# Patient Record
Sex: FEMALE | Race: WHITE | NOT HISPANIC OR LATINO | Employment: OTHER | URBAN - METROPOLITAN AREA
[De-identification: names, ages, dates, MRNs, and addresses within clinical notes are randomized per-mention and may not be internally consistent; named-entity substitution may affect disease eponyms.]

---

## 2022-08-20 ENCOUNTER — APPOINTMENT (OUTPATIENT)
Dept: GENERAL RADIOLOGY | Facility: CLINIC | Age: 72
End: 2022-08-20
Attending: EMERGENCY MEDICINE
Payer: COMMERCIAL

## 2022-08-20 ENCOUNTER — OFFICE VISIT (OUTPATIENT)
Dept: URGENT CARE | Facility: URGENT CARE | Age: 72
End: 2022-08-20
Payer: COMMERCIAL

## 2022-08-20 ENCOUNTER — APPOINTMENT (OUTPATIENT)
Dept: ULTRASOUND IMAGING | Facility: CLINIC | Age: 72
End: 2022-08-20
Attending: EMERGENCY MEDICINE
Payer: COMMERCIAL

## 2022-08-20 ENCOUNTER — NURSE TRIAGE (OUTPATIENT)
Dept: NURSING | Facility: CLINIC | Age: 72
End: 2022-08-20

## 2022-08-20 ENCOUNTER — HOSPITAL ENCOUNTER (EMERGENCY)
Facility: CLINIC | Age: 72
Discharge: HOME | End: 2022-08-20
Attending: EMERGENCY MEDICINE | Admitting: EMERGENCY MEDICINE
Payer: COMMERCIAL

## 2022-08-20 VITALS
HEIGHT: 63 IN | WEIGHT: 155 LBS | HEART RATE: 58 BPM | OXYGEN SATURATION: 100 % | DIASTOLIC BLOOD PRESSURE: 101 MMHG | TEMPERATURE: 97.8 F | BODY MASS INDEX: 27.46 KG/M2 | SYSTOLIC BLOOD PRESSURE: 163 MMHG | RESPIRATION RATE: 16 BRPM

## 2022-08-20 VITALS
RESPIRATION RATE: 20 BRPM | OXYGEN SATURATION: 100 % | DIASTOLIC BLOOD PRESSURE: 85 MMHG | HEART RATE: 63 BPM | TEMPERATURE: 97.9 F | SYSTOLIC BLOOD PRESSURE: 147 MMHG | WEIGHT: 155 LBS

## 2022-08-20 DIAGNOSIS — M25.552 HIP PAIN, LEFT: ICD-10-CM

## 2022-08-20 DIAGNOSIS — R60.0 LEG EDEMA, LEFT: Primary | ICD-10-CM

## 2022-08-20 DIAGNOSIS — M25.552 PAIN IN JOINT INVOLVING PELVIC REGION AND THIGH, LEFT: ICD-10-CM

## 2022-08-20 DIAGNOSIS — M79.652 PAIN OF LEFT THIGH: ICD-10-CM

## 2022-08-20 PROCEDURE — 250N000013 HC RX MED GY IP 250 OP 250 PS 637: Performed by: EMERGENCY MEDICINE

## 2022-08-20 PROCEDURE — 73502 X-RAY EXAM HIP UNI 2-3 VIEWS: CPT

## 2022-08-20 PROCEDURE — 93971 EXTREMITY STUDY: CPT | Mod: LT

## 2022-08-20 PROCEDURE — 99284 EMERGENCY DEPT VISIT MOD MDM: CPT | Mod: 25

## 2022-08-20 PROCEDURE — 99207 PR NO CHARGE LOS: CPT | Performed by: NURSE PRACTITIONER

## 2022-08-20 RX ORDER — FAMOTIDINE 20 MG/1
1 TABLET, FILM COATED ORAL 2 TIMES DAILY
COMMUNITY
Start: 2021-06-10 | End: 2023-05-07

## 2022-08-20 RX ORDER — MELOXICAM 15 MG/1
1 TABLET ORAL DAILY
COMMUNITY
Start: 2021-11-01 | End: 2023-08-10

## 2022-08-20 RX ORDER — CYCLOBENZAPRINE HCL 10 MG
10 TABLET ORAL DAILY
COMMUNITY
Start: 2022-05-03 | End: 2023-05-07

## 2022-08-20 RX ORDER — ACETAMINOPHEN 500 MG
500 TABLET ORAL
COMMUNITY
Start: 2022-05-03 | End: 2023-05-07

## 2022-08-20 RX ORDER — PRAMIPEXOLE DIHYDROCHLORIDE 0.12 MG/1
1 TABLET ORAL AT BEDTIME
COMMUNITY
Start: 2022-01-25

## 2022-08-20 RX ORDER — CETIRIZINE HYDROCHLORIDE 10 MG/1
10 TABLET ORAL DAILY
COMMUNITY
Start: 2020-08-31

## 2022-08-20 RX ORDER — IBUPROFEN 400 MG/1
400 TABLET, FILM COATED ORAL ONCE
Status: COMPLETED | OUTPATIENT
Start: 2022-08-20 | End: 2022-08-20

## 2022-08-20 RX ORDER — LOSARTAN POTASSIUM 50 MG/1
1 TABLET ORAL DAILY
COMMUNITY
Start: 2021-12-16 | End: 2023-05-07

## 2022-08-20 RX ORDER — ALBUTEROL SULFATE 90 UG/1
2 AEROSOL, METERED RESPIRATORY (INHALATION) EVERY 4 HOURS PRN
COMMUNITY
Start: 2021-01-22

## 2022-08-20 RX ORDER — CAMPHOR 0.45 %
1 GEL (GRAM) TOPICAL PRN
COMMUNITY
Start: 2022-08-18

## 2022-08-20 RX ORDER — TRIAMTERENE AND HYDROCHLOROTHIAZIDE 37.5; 25 MG/1; MG/1
1 CAPSULE ORAL DAILY
Status: ON HOLD | COMMUNITY
Start: 2021-11-01 | End: 2023-05-10

## 2022-08-20 RX ORDER — PREDNISONE 20 MG/1
20 TABLET ORAL
COMMUNITY
Start: 2022-03-02 | End: 2023-05-07

## 2022-08-20 RX ADMIN — IBUPROFEN 400 MG: 400 TABLET, FILM COATED ORAL at 15:23

## 2022-08-20 ASSESSMENT — ACTIVITIES OF DAILY LIVING (ADL)
ADLS_ACUITY_SCORE: 33
ADLS_ACUITY_SCORE: 35

## 2022-08-20 ASSESSMENT — ENCOUNTER SYMPTOMS
WEAKNESS: 0
NUMBNESS: 0
FEVER: 0
ARTHRALGIAS: 1
BACK PAIN: 1
SHORTNESS OF BREATH: 0

## 2022-08-20 NOTE — ED TRIAGE NOTES
Left hip left thigh pain since Wednesday - denies any redness denies any swelling denies trauma        Triage Assessment     Row Name 08/20/22 6001       Triage Assessment (Adult)    Airway WDL WDL       Respiratory WDL    Respiratory WDL WDL       Cardiac WDL    Cardiac WDL WDL       Peripheral/Neurovascular WDL    Peripheral Neurovascular WDL WDL       Cognitive/Neuro/Behavioral WDL    Cognitive/Neuro/Behavioral WDL WDL

## 2022-08-20 NOTE — TELEPHONE ENCOUNTER
"Patient is complaining of pain in her left hip area that radiates into her left thigh, which makes it \"difficult for her to to walk\"  Patient thinks she \"overdid exercise on Tuesday and Wednesday three to four days ago at the gym\"  08/19/22 pain was excruciating, today 08/20/22 pain is improved  Patient is taking tylenol, miloxicam, and heating pad which are non-effective  Rates pain 5/10  Triage guidelines recommend to see pcp within 3 days  98.7 orally    Reason for Disposition    [1] MODERATE pain (e.g., interferes with normal activities, limping) AND [2] present > 3 days    Additional Information    Negative: Looks like a broken bone or dislocated joint (e.g., crooked or deformed)    Negative: Sounds like a life-threatening emergency to the triager    Negative: Followed a hip injury    Negative: Leg pain is main symptom    Negative: Back pain radiating (shooting) into hip    Negative: [1] SEVERE pain (e.g., excruciating, unable to do any normal activities) AND [2] fever    Negative: Can't stand (bear weight) or walk    Negative: [1] Red area or streak AND [2] fever    Negative: Patient sounds very sick or weak to the triager    Negative: [1] SEVERE pain (e.g., excruciating, unable to do any normal activities) AND [2] not improved after 2 hours of pain medicine    Negative: [1] Looks infected (spreading redness, pus) AND [2] large red area (> 2 in. or 5 cm)    Negative: [1] Painful rash AND [2] multiple small blisters grouped together (i.e., dermatomal distribution or \"band\" or \"stripe\")    Negative: Looks like a boil, infected sore, or deep ulcer    Negative: [1] Localized rash is very painful AND [2] no fever    Negative: [1] Redness of the skin AND [2] no fever    Negative: Numbness in a leg or foot (i.e., loss of sensation)    Protocols used: HIP PAIN-A-AH      "

## 2022-08-20 NOTE — PROGRESS NOTES
"Assessment & Plan     Leg edema, left      Pain in joint involving pelvic region and thigh, left    Discussed we cannot get US to r/o clot in UC.    Discussed options including XR and d dimer in UC or to go to ER.    Pt opted to go to ER to rule out clot.         Return in about 1 week (around 8/27/2022) for with regular provider if symptoms persist.    ROSIBEL Hernandez CNP  [facility] SALVADOR Lopez is a 71 year old female who presents to clinic today for the following health issues:  Chief Complaint   Patient presents with     Urgent Care     Left leg and hip pain possible sciatica, HX of lower back pain      HPI    Left thigh more swollen  Worried about a blood clot  Not red or hot  Acute pain x 3 days  Left side Sciatica flaring \"butt pain\".  Does not know if it is related to a hard workout or a clot.    Non smoker  No history of clot/DVT/PE     Review of Systems  Constitutional, HEENT, cardiovascular, pulmonary, gi and gu systems are negative, except as otherwise noted.      Objective    BP (!) 147/85   Pulse 63   Temp 97.9  F (36.6  C)   Resp 20   Wt 70.3 kg (155 lb)   SpO2 100%   Physical Exam         "

## 2022-08-20 NOTE — ED PROVIDER NOTES
"  History     Chief Complaint:  Leg Pain      HPI   Jessica Ricketts is a 71 year old female who presents with pain in the left hip and anterior thigh for the past 3 days.  She did a harder workout on Wednesday and noticed the pain after this.  The pain is worse with walking.  She has been taking meloxicam and acetaminophen with minimal relief.  She denies any specific known injury.  She feels like the thigh might be a little swollen.  She denies any erythema or warmth.  She denies any fevers.  She denies any numbness or tingling down the leg.  She denies any pain below the knee.  She also notes some \"sciatica\" pain which is not abnormal for her down the left glutes.  She denies any chest pain, shortness of breath, hemoptysis.  She has no known clotting disorders or family history of thromboembolism.  She is not on any estrogen supplement.      Review of Systems   Constitutional: Negative for fever.   Respiratory: Negative for shortness of breath.    Cardiovascular: Negative for chest pain.   Musculoskeletal: Positive for arthralgias and back pain.   Neurological: Negative for weakness and numbness.   All other systems reviewed and are negative.        Allergies:  Oxycodone  Seasonal Allergies  Beta Adrenergic Blockers  Lisinopril    Medications:    acetaminophen (TYLENOL) 500 MG tablet  albuterol (PROAIR HFA/PROVENTIL HFA/VENTOLIN HFA) 108 (90 Base) MCG/ACT inhaler  cetirizine (ZYRTEC) 10 MG tablet  cyclobenzaprine (FLEXERIL) 10 MG tablet  diclofenac (VOLTAREN) 1 % topical gel  diphenhydramine-zinc acetate (BENADRYL ITCH RELIEF STICK) 2-0.1 % external stick  famotidine (PEPCID) 20 MG tablet  FLUoxetine (PROZAC) 20 MG capsule  fluticasone-vilanterol (BREO ELLIPTA) 200-25 MCG/INH inhaler  losartan (COZAAR) 50 MG tablet  meloxicam (MOBIC) 15 MG tablet  pramipexole (MIRAPEX) 0.125 MG tablet  predniSONE (DELTASONE) 20 MG tablet  saline 0.65 % SOLN  triamterene-HCTZ (DYAZIDE) 37.5-25 MG capsule         Past Medical " "History:   Past Surgical History:     History reviewed. No pertinent past medical history. History reviewed. No pertinent surgical history.   There are no problems to display for this patient.         Family History  History reviewed. No pertinent family history.    Social History:  PCP: Eugene Rush  Presents to the ED alone by private vehicle    Physical Exam     Patient Vitals for the past 24 hrs:   BP Temp Temp src Pulse Resp SpO2 Height Weight   08/20/22 1436 (!) 163/101 97.8  F (36.6  C) Oral 58 16 100 % 1.6 m (5' 3\") 70.3 kg (155 lb)       Physical Exam  Vitals and nursing note reviewed.   Constitutional:       General: She is not in acute distress.     Appearance: She is not ill-appearing.   HENT:      Head: Normocephalic and atraumatic.      Right Ear: External ear normal.      Left Ear: External ear normal.   Eyes:      Extraocular Movements: Extraocular movements intact.      Conjunctiva/sclera: Conjunctivae normal.   Cardiovascular:      Rate and Rhythm: Normal rate.      Pulses:           Dorsalis pedis pulses are 2+ on the left side.        Posterior tibial pulses are 2+ on the left side.   Pulmonary:      Effort: Pulmonary effort is normal. No respiratory distress.   Abdominal:      General: There is no distension.      Palpations: Abdomen is soft.      Tenderness: There is no abdominal tenderness. There is no guarding or rebound.   Musculoskeletal:         General: Tenderness (mild tenderness of L anterior thigh and hip) present. No swelling, deformity or signs of injury.      Left hip: Tenderness (mild) present. No deformity. Normal range of motion.      Left upper leg: Tenderness (mild) present. No swelling.   Skin:     Coloration: Skin is not pale.      Findings: No rash.   Neurological:      Mental Status: She is alert.      Sensory: Sensation is intact.      Motor: No weakness.   Psychiatric:         Behavior: Behavior normal.         Emergency Department Course     No results found for this " or any previous visit.    Imaging:  XR Pelvis w Hip Left 1 View   Final Result   IMPRESSION: No fracture. Mild degenerative changes in the right hip.      US Lower Extremity Venous Duplex Left   Final Result   IMPRESSION:   1.  No deep venous thrombosis in the left lower extremity.          Laboratory:  Labs Ordered and Resulted from Time of ED Arrival to Time of ED Departure - No data to display        Emergency Department Course:           Reviewed:  I reviewed nursing notes, vitals, past history and care everywhere    Interventions:  Medications   ibuprofen (ADVIL/MOTRIN) tablet 400 mg (400 mg Oral Given 22 1523)       Disposition:  The patient was discharged to home.    Impression & Plan      CMS Diagnoses: None       Medical Decision Makin-year-old female with left hip and anterior thigh pain for the past few days.  Suspect musculoskeletal etiology.  Possibly a quad or hip flexor strain or referred pain from left hip arthritis.  Pain is not down the left posterior thigh so less likely to be sciatica.  No numbness or weakness so unlikely radiculopathy.  No significant swelling, erythema, warmth so low suspicion for infectious etiology.  Also also have low suspicion for DVT, however this is patient's primary concern.  We will x-ray the left hip and pelvis to evaluate for arthritis and check left lower extremity venous Doppler.    Covid-19  Jessica Ricketts was evaluated during a global COVID-19 pandemic, which necessitated consideration that the patient might be at risk for infection with the SARS-CoV-2 virus that causes COVID-19.  Applicable protocols for evaluation were followed during the patient's care. COVID-19 was considered as part of the patient's evaluation.       Diagnosis:    ICD-10-CM    1. Hip pain, left  M25.552    2. Pain of left thigh  M79.652        Discharge Medications:  Discharge Medication List as of 2022  6:08 PM                 Daniel Valle MD  22 0194

## 2023-05-07 ENCOUNTER — HOSPITAL ENCOUNTER (OUTPATIENT)
Facility: CLINIC | Age: 73
Setting detail: OBSERVATION
Discharge: HOME | End: 2023-05-10
Attending: EMERGENCY MEDICINE | Admitting: INTERNAL MEDICINE
Payer: COMMERCIAL

## 2023-05-07 ENCOUNTER — APPOINTMENT (OUTPATIENT)
Dept: CT IMAGING | Facility: CLINIC | Age: 73
End: 2023-05-07
Attending: EMERGENCY MEDICINE
Payer: COMMERCIAL

## 2023-05-07 DIAGNOSIS — I49.1 ECTOPIC ATRIAL RHYTHM: Primary | ICD-10-CM

## 2023-05-07 DIAGNOSIS — R11.0 NAUSEA: ICD-10-CM

## 2023-05-07 DIAGNOSIS — E87.1 HYPONATREMIA: ICD-10-CM

## 2023-05-07 DIAGNOSIS — I10 BENIGN ESSENTIAL HYPERTENSION: ICD-10-CM

## 2023-05-07 DIAGNOSIS — R11.11 VOMITING WITHOUT NAUSEA, UNSPECIFIED VOMITING TYPE: ICD-10-CM

## 2023-05-07 DIAGNOSIS — R10.31 ABDOMINAL PAIN, RIGHT LOWER QUADRANT: ICD-10-CM

## 2023-05-07 DIAGNOSIS — R10.13 ABDOMINAL PAIN, EPIGASTRIC: ICD-10-CM

## 2023-05-07 DIAGNOSIS — K21.00 GASTROESOPHAGEAL REFLUX DISEASE WITH ESOPHAGITIS WITHOUT HEMORRHAGE: ICD-10-CM

## 2023-05-07 PROBLEM — F32.0 MILD SINGLE CURRENT EPISODE OF MAJOR DEPRESSIVE DISORDER (H): Status: ACTIVE | Noted: 2017-12-16

## 2023-05-07 PROBLEM — J30.9 ALLERGIC RHINITIS: Status: ACTIVE | Noted: 2019-10-07

## 2023-05-07 PROBLEM — M85.80 OSTEOPENIA DETERMINED BY X-RAY: Status: ACTIVE | Noted: 2018-06-08

## 2023-05-07 PROBLEM — J45.40 MODERATE PERSISTENT ASTHMA WITHOUT COMPLICATION: Status: ACTIVE | Noted: 2019-06-12

## 2023-05-07 PROBLEM — F33.41 RECURRENT MAJOR DEPRESSIVE DISORDER, IN PARTIAL REMISSION (H): Status: ACTIVE | Noted: 2020-11-06

## 2023-05-07 PROBLEM — G56.03 BILATERAL CARPAL TUNNEL SYNDROME: Status: ACTIVE | Noted: 2023-05-07

## 2023-05-07 PROBLEM — H90.42 SENSORINEURAL HEARING LOSS (SNHL) OF LEFT EAR WITH UNRESTRICTED HEARING OF RIGHT EAR: Status: ACTIVE | Noted: 2022-09-21

## 2023-05-07 PROBLEM — M25.9 DISORDER OF SHOULDER: Status: ACTIVE | Noted: 2018-05-31

## 2023-05-07 PROBLEM — G25.81 RESTLESS LEG SYNDROME: Status: ACTIVE | Noted: 2019-02-10

## 2023-05-07 PROBLEM — G47.33 OSA (OBSTRUCTIVE SLEEP APNEA): Status: ACTIVE | Noted: 2023-05-07

## 2023-05-07 PROBLEM — M43.17 SPONDYLOLISTHESIS AT L5-S1 LEVEL: Status: ACTIVE | Noted: 2017-11-01

## 2023-05-07 PROBLEM — B37.9 CANDIDIASIS: Status: ACTIVE | Noted: 2018-10-19

## 2023-05-07 LAB
ALBUMIN SERPL BCG-MCNC: 4.1 G/DL (ref 3.5–5.2)
ALBUMIN UR-MCNC: NEGATIVE MG/DL
ALP SERPL-CCNC: 52 U/L (ref 35–104)
ALT SERPL W P-5'-P-CCNC: 22 U/L (ref 10–35)
ANION GAP SERPL CALCULATED.3IONS-SCNC: 10 MMOL/L (ref 7–15)
APPEARANCE UR: CLEAR
AST SERPL W P-5'-P-CCNC: 18 U/L (ref 10–35)
BASOPHILS # BLD AUTO: 0 10E3/UL (ref 0–0.2)
BASOPHILS NFR BLD AUTO: 0 %
BILIRUB DIRECT SERPL-MCNC: <0.2 MG/DL (ref 0–0.3)
BILIRUB SERPL-MCNC: 0.3 MG/DL
BILIRUB UR QL STRIP: NEGATIVE
BUN SERPL-MCNC: 17 MG/DL (ref 8–23)
CALCIUM SERPL-MCNC: 9.1 MG/DL (ref 8.8–10.2)
CHLORIDE SERPL-SCNC: 88 MMOL/L (ref 98–107)
COLOR UR AUTO: ABNORMAL
CREAT SERPL-MCNC: 0.8 MG/DL (ref 0.51–0.95)
DEPRECATED HCO3 PLAS-SCNC: 28 MMOL/L (ref 22–29)
EOSINOPHIL # BLD AUTO: 0.1 10E3/UL (ref 0–0.7)
EOSINOPHIL NFR BLD AUTO: 1 %
ERYTHROCYTE [DISTWIDTH] IN BLOOD BY AUTOMATED COUNT: 13.3 % (ref 10–15)
GFR SERPL CREATININE-BSD FRML MDRD: 79 ML/MIN/1.73M2
GLUCOSE SERPL-MCNC: 90 MG/DL (ref 70–99)
GLUCOSE UR STRIP-MCNC: NEGATIVE MG/DL
HCT VFR BLD AUTO: 34 % (ref 35–47)
HGB BLD-MCNC: 11.5 G/DL (ref 11.7–15.7)
HGB UR QL STRIP: NEGATIVE
IMM GRANULOCYTES # BLD: 0 10E3/UL
IMM GRANULOCYTES NFR BLD: 0 %
KETONES UR STRIP-MCNC: ABNORMAL MG/DL
LEUKOCYTE ESTERASE UR QL STRIP: NEGATIVE
LYMPHOCYTES # BLD AUTO: 1.4 10E3/UL (ref 0.8–5.3)
LYMPHOCYTES NFR BLD AUTO: 21 %
MCH RBC QN AUTO: 30.3 PG (ref 26.5–33)
MCHC RBC AUTO-ENTMCNC: 33.8 G/DL (ref 31.5–36.5)
MCV RBC AUTO: 90 FL (ref 78–100)
MONOCYTES # BLD AUTO: 0.6 10E3/UL (ref 0–1.3)
MONOCYTES NFR BLD AUTO: 8 %
NEUTROPHILS # BLD AUTO: 4.7 10E3/UL (ref 1.6–8.3)
NEUTROPHILS NFR BLD AUTO: 70 %
NITRATE UR QL: NEGATIVE
NRBC # BLD AUTO: 0 10E3/UL
NRBC BLD AUTO-RTO: 0 /100
PH UR STRIP: 7.5 [PH] (ref 5–7)
PLATELET # BLD AUTO: 282 10E3/UL (ref 150–450)
POTASSIUM SERPL-SCNC: 3.9 MMOL/L (ref 3.4–5.3)
PROT SERPL-MCNC: 6.5 G/DL (ref 6.4–8.3)
RBC # BLD AUTO: 3.8 10E6/UL (ref 3.8–5.2)
SODIUM SERPL-SCNC: 126 MMOL/L (ref 136–145)
SP GR UR STRIP: 1 (ref 1–1.03)
UROBILINOGEN UR STRIP-MCNC: NORMAL MG/DL
WBC # BLD AUTO: 6.8 10E3/UL (ref 4–11)

## 2023-05-07 PROCEDURE — 96375 TX/PRO/DX INJ NEW DRUG ADDON: CPT

## 2023-05-07 PROCEDURE — 74177 CT ABD & PELVIS W/CONTRAST: CPT

## 2023-05-07 PROCEDURE — 80053 COMPREHEN METABOLIC PANEL: CPT | Performed by: EMERGENCY MEDICINE

## 2023-05-07 PROCEDURE — G0378 HOSPITAL OBSERVATION PER HR: HCPCS

## 2023-05-07 PROCEDURE — 258N000003 HC RX IP 258 OP 636: Performed by: HOSPITALIST

## 2023-05-07 PROCEDURE — 36415 COLL VENOUS BLD VENIPUNCTURE: CPT | Performed by: EMERGENCY MEDICINE

## 2023-05-07 PROCEDURE — 258N000003 HC RX IP 258 OP 636: Performed by: EMERGENCY MEDICINE

## 2023-05-07 PROCEDURE — 96374 THER/PROPH/DIAG INJ IV PUSH: CPT | Mod: 59

## 2023-05-07 PROCEDURE — 250N000011 HC RX IP 250 OP 636: Performed by: EMERGENCY MEDICINE

## 2023-05-07 PROCEDURE — 99223 1ST HOSP IP/OBS HIGH 75: CPT | Performed by: HOSPITALIST

## 2023-05-07 PROCEDURE — 96361 HYDRATE IV INFUSION ADD-ON: CPT

## 2023-05-07 PROCEDURE — 81003 URINALYSIS AUTO W/O SCOPE: CPT | Performed by: EMERGENCY MEDICINE

## 2023-05-07 PROCEDURE — 96376 TX/PRO/DX INJ SAME DRUG ADON: CPT

## 2023-05-07 PROCEDURE — 85025 COMPLETE CBC W/AUTO DIFF WBC: CPT | Performed by: EMERGENCY MEDICINE

## 2023-05-07 PROCEDURE — 82248 BILIRUBIN DIRECT: CPT | Performed by: STUDENT IN AN ORGANIZED HEALTH CARE EDUCATION/TRAINING PROGRAM

## 2023-05-07 PROCEDURE — C9113 INJ PANTOPRAZOLE SODIUM, VIA: HCPCS | Performed by: EMERGENCY MEDICINE

## 2023-05-07 PROCEDURE — 250N000013 HC RX MED GY IP 250 OP 250 PS 637: Performed by: HOSPITALIST

## 2023-05-07 PROCEDURE — 99285 EMERGENCY DEPT VISIT HI MDM: CPT | Mod: 25

## 2023-05-07 PROCEDURE — 250N000009 HC RX 250: Performed by: EMERGENCY MEDICINE

## 2023-05-07 RX ORDER — ACETAMINOPHEN 325 MG/1
325-650 TABLET ORAL EVERY 6 HOURS PRN
COMMUNITY

## 2023-05-07 RX ORDER — LOSARTAN POTASSIUM 50 MG/1
50 TABLET ORAL DAILY
Status: DISCONTINUED | OUTPATIENT
Start: 2023-05-08 | End: 2023-05-10

## 2023-05-07 RX ORDER — FLUOXETINE 10 MG/1
10 CAPSULE ORAL DAILY
COMMUNITY
Start: 2023-03-22

## 2023-05-07 RX ORDER — ONDANSETRON 4 MG/1
4 TABLET, ORALLY DISINTEGRATING ORAL EVERY 6 HOURS PRN
Qty: 10 TABLET | Refills: 0 | Status: SHIPPED | OUTPATIENT
Start: 2023-05-07 | End: 2023-05-10

## 2023-05-07 RX ORDER — LOSARTAN POTASSIUM 100 MG/1
100 TABLET ORAL DAILY
COMMUNITY
Start: 2023-04-27

## 2023-05-07 RX ORDER — PROCHLORPERAZINE 25 MG
12.5 SUPPOSITORY, RECTAL RECTAL EVERY 12 HOURS PRN
Status: DISCONTINUED | OUTPATIENT
Start: 2023-05-07 | End: 2023-05-10 | Stop reason: HOSPADM

## 2023-05-07 RX ORDER — PROCHLORPERAZINE MALEATE 5 MG
5 TABLET ORAL EVERY 6 HOURS PRN
Status: DISCONTINUED | OUTPATIENT
Start: 2023-05-07 | End: 2023-05-10 | Stop reason: HOSPADM

## 2023-05-07 RX ORDER — ONDANSETRON 2 MG/ML
4 INJECTION INTRAMUSCULAR; INTRAVENOUS EVERY 6 HOURS PRN
Status: DISCONTINUED | OUTPATIENT
Start: 2023-05-07 | End: 2023-05-09

## 2023-05-07 RX ORDER — CYCLOBENZAPRINE HCL 10 MG
10 TABLET ORAL DAILY
Status: DISCONTINUED | OUTPATIENT
Start: 2023-05-08 | End: 2023-05-07

## 2023-05-07 RX ORDER — SODIUM CHLORIDE 9 MG/ML
INJECTION, SOLUTION INTRAVENOUS ONCE
Status: COMPLETED | OUTPATIENT
Start: 2023-05-07 | End: 2023-05-07

## 2023-05-07 RX ORDER — ONDANSETRON 2 MG/ML
4 INJECTION INTRAMUSCULAR; INTRAVENOUS ONCE
Status: COMPLETED | OUTPATIENT
Start: 2023-05-07 | End: 2023-05-07

## 2023-05-07 RX ORDER — PRAMIPEXOLE DIHYDROCHLORIDE 0.12 MG/1
0.12 TABLET ORAL AT BEDTIME
Status: DISCONTINUED | OUTPATIENT
Start: 2023-05-07 | End: 2023-05-10 | Stop reason: HOSPADM

## 2023-05-07 RX ORDER — SODIUM CHLORIDE 9 MG/ML
INJECTION, SOLUTION INTRAVENOUS CONTINUOUS
Status: DISCONTINUED | OUTPATIENT
Start: 2023-05-07 | End: 2023-05-09

## 2023-05-07 RX ORDER — LIDOCAINE 40 MG/G
CREAM TOPICAL
Status: DISCONTINUED | OUTPATIENT
Start: 2023-05-07 | End: 2023-05-10 | Stop reason: HOSPADM

## 2023-05-07 RX ORDER — FLUTICASONE PROPIONATE 50 MCG
1 SPRAY, SUSPENSION (ML) NASAL DAILY
COMMUNITY

## 2023-05-07 RX ORDER — MONTELUKAST SODIUM 10 MG/1
10 TABLET ORAL AT BEDTIME
COMMUNITY
Start: 2023-04-03

## 2023-05-07 RX ORDER — CETIRIZINE HYDROCHLORIDE 10 MG/1
10 TABLET ORAL DAILY
Status: DISCONTINUED | OUTPATIENT
Start: 2023-05-08 | End: 2023-05-10 | Stop reason: HOSPADM

## 2023-05-07 RX ORDER — ONDANSETRON 4 MG/1
4 TABLET, ORALLY DISINTEGRATING ORAL EVERY 6 HOURS PRN
Status: DISCONTINUED | OUTPATIENT
Start: 2023-05-07 | End: 2023-05-09

## 2023-05-07 RX ORDER — HYDROMORPHONE HYDROCHLORIDE 1 MG/ML
0.5 INJECTION, SOLUTION INTRAMUSCULAR; INTRAVENOUS; SUBCUTANEOUS ONCE
Status: COMPLETED | OUTPATIENT
Start: 2023-05-07 | End: 2023-05-07

## 2023-05-07 RX ORDER — IOPAMIDOL 755 MG/ML
81 INJECTION, SOLUTION INTRAVASCULAR ONCE
Status: COMPLETED | OUTPATIENT
Start: 2023-05-07 | End: 2023-05-07

## 2023-05-07 RX ORDER — FLUTICASONE FUROATE AND VILANTEROL 200; 25 UG/1; UG/1
1 POWDER RESPIRATORY (INHALATION) EVERY EVENING
Status: DISCONTINUED | OUTPATIENT
Start: 2023-05-07 | End: 2023-05-10 | Stop reason: HOSPADM

## 2023-05-07 RX ADMIN — FLUOXETINE 20 MG: 20 CAPSULE ORAL at 22:44

## 2023-05-07 RX ADMIN — ONDANSETRON 4 MG: 2 INJECTION INTRAMUSCULAR; INTRAVENOUS at 15:06

## 2023-05-07 RX ADMIN — SODIUM CHLORIDE 1000 ML: 9 INJECTION, SOLUTION INTRAVENOUS at 15:05

## 2023-05-07 RX ADMIN — HYDROMORPHONE HYDROCHLORIDE 0.5 MG: 1 INJECTION, SOLUTION INTRAMUSCULAR; INTRAVENOUS; SUBCUTANEOUS at 17:26

## 2023-05-07 RX ADMIN — SODIUM CHLORIDE 61 ML: 9 INJECTION, SOLUTION INTRAVENOUS at 18:13

## 2023-05-07 RX ADMIN — SODIUM CHLORIDE 100 ML/HR: 9 INJECTION, SOLUTION INTRAVENOUS at 22:50

## 2023-05-07 RX ADMIN — ONDANSETRON 4 MG: 2 INJECTION INTRAMUSCULAR; INTRAVENOUS at 17:24

## 2023-05-07 RX ADMIN — SODIUM CHLORIDE: 9 INJECTION, SOLUTION INTRAVENOUS at 17:26

## 2023-05-07 RX ADMIN — SODIUM CHLORIDE: 9 INJECTION, SOLUTION INTRAVENOUS at 20:44

## 2023-05-07 RX ADMIN — IOPAMIDOL 81 ML: 755 INJECTION, SOLUTION INTRAVENOUS at 18:13

## 2023-05-07 RX ADMIN — PANTOPRAZOLE SODIUM 80 MG: 40 INJECTION, POWDER, FOR SOLUTION INTRAVENOUS at 20:02

## 2023-05-07 RX ADMIN — PRAMIPEXOLE DIHYDROCHLORIDE 0.12 MG: 0.12 TABLET ORAL at 22:44

## 2023-05-07 ASSESSMENT — ACTIVITIES OF DAILY LIVING (ADL)
ADLS_ACUITY_SCORE: 35
ADLS_ACUITY_SCORE: 33
ADLS_ACUITY_SCORE: 20
ADLS_ACUITY_SCORE: 35

## 2023-05-07 ASSESSMENT — ENCOUNTER SYMPTOMS
DIARRHEA: 1
APPETITE CHANGE: 1
FEVER: 0
VOMITING: 1
ABDOMINAL PAIN: 1

## 2023-05-07 NOTE — ED PROVIDER NOTES
History     Chief Complaint:  Abdominal Pain       The history is provided by the patient and medical records.      Jessica Ricketts is a 72 year old female with a history of diverticulitis and chronic atrophic gastritis who presents with abdominal pain. The patient reports that four days ago in the evening she began experiencing right lower quadrant abdominal pain. She explains that since then she has experienced vomiting, decreased appetite, and loose stools yesterday and today. She describes the pain as a worsening dull ache. She is unsure if the pain is worsened by movement. She denies experiencing fever or any urinary problems. She also denies any history of kidney stones or diverticulitis however, per chart review, the patient does have a history of diverticulitis. She was seen today at urgent care and was advised to present to the ED for imaging studies to rule out appendicitis.    The daughter brought up more history and specifically stated that the mom is really not had much in the way of nausea but has been pretty much just vomiting and some loose stool.  She has been trying to push water and can keep liquids down but any solid foods come right back up.  This is going on her fifth day now without any solids and she is concerned about her because of her age and the fact that she is gotten weaker.    Independent Historian:   None - Patient Only    Review of External Notes: I reviewed the office visit from 4/27/2023    ROS:  Review of Systems   Constitutional: Positive for appetite change. Negative for fever.   Gastrointestinal: Positive for abdominal pain, diarrhea and vomiting.   Genitourinary: Negative.    All other systems reviewed and are negative.      Allergies:  Oxycodone  Seasonal Allergies  Beta Adrenergic Blockers  Lisinopril     Medications:    Albuterol inhaler  Cyclobenzaprine  Fluoxetine  Fluticasone furoate-vilanterol  "inhaler  Losartan  Meloxicam  Montelukast  Pramipexole  Prednisone  Tizanidine  Triamterene-hydrochlorothiazide    Past Medical History:    Actinic keratosis  Allergic rhinitis  Antibiotic prophylaxis for dental procedure indicated due to prior joint replacement  Asthma, moderate persistent without complication  Bilateral carpal tunnel syndrome  Chronic atrophic gastritis without bleeding  Chronic low back pain  Diverticulitis  Diverticulosis  GERD with esophagitis  Hypertension  Internal hemorrhoid   Major depressive disorder, recurrent, in partial remission  Obstructive sleep apnea  Osteoarthritis  Osteopenia, determined by X-ray  Restless leg syndrome  Sensorineural hearing loss of left ear with unrestricted hearing of right ear  Shoulder impingement, right  Spondylolisthesis at L5-S1 level  Tinea corporis    Past Surgical History:    Arthroplasty knee, right, total  Colonoscopy with polypectomy  Cystoscopy, flexible, bladder repair  ORIF ankle, right  ORIF wrist, left  Tubal ligation    Family History:    Father: CHF, hypertension, kidney cancer  Mother: Breast cancer, hypertension, hyperlipidemia, dementia  Brother: Hypertension    Social History:  The patient presents to the ED with her daughter.  They arrived via private vehicle.  PCP: Eugene Rush     Physical Exam     Patient Vitals for the past 24 hrs:   BP Temp Temp src Pulse Resp SpO2 Height Weight   05/07/23 1843 -- -- -- -- -- 98 % -- --   05/07/23 1841 -- -- -- -- -- 97 % -- --   05/07/23 1743 -- -- -- -- -- 99 % -- --   05/07/23 1453 (!) 141/86 98  F (36.7  C) Oral 55 16 100 % 1.6 m (5' 3\") 72.6 kg (160 lb)        Physical Exam  Nursing note and vitals reviewed.    Constitutional:  Appears comfortable.    HENT:                Nose normal.  No discharge.      Oral mucosa is dry.  Eyes:    Conjunctivae are normal without injection.  Pupils are equal.  Cardiovascular:  Normal rate, regular rhythm with normal S1 and S2.      Normal heart sounds and " peripheral pulses 2+ and equal.       No murmur or ninfa.  Pulmonary:  Effort normal and breath sounds clear to auscultation bilaterally   GI:    Right lower quadrant pain with guarding.  She does have some epigastric pain as well.     No flank pain.    Musculoskeletal:  Normal range of motion. No extremity deformity.     No edema and no tenderness.    Neurological:   Alert and oriented. No focal weakness.  Skin:    Skin is warm and dry. No rash noted.   Psychiatric:   Behavior is normal. Appropriate mood and affect.     Judgment and thought content normal.     Emergency Department Course     Imaging:  CT Abdomen Pelvis w Contrast   Final Result   IMPRESSION:    1.  Normal appendix.   2.  No evidence for bowel obstruction.    3.  Sigmoid diverticulosis.   4.  No findings to account for the patient's symptoms.         Report per radiology    Laboratory:  Labs Ordered and Resulted from Time of ED Arrival to Time of ED Departure   UA MACROSCOPIC WITH REFLEX TO MICRO AND CULTURE - Abnormal       Result Value    Color Urine Straw      Appearance Urine Clear      Glucose Urine Negative      Bilirubin Urine Negative      Ketones Urine Trace (*)     Specific Gravity Urine 1.005      Blood Urine Negative      pH Urine 7.5 (*)     Protein Albumin Urine Negative      Urobilinogen Urine Normal      Nitrite Urine Negative      Leukocyte Esterase Urine Negative     BASIC METABOLIC PANEL - Abnormal    Sodium 126 (*)     Potassium 3.9      Chloride 88 (*)     Carbon Dioxide (CO2) 28      Anion Gap 10      Urea Nitrogen 17.0      Creatinine 0.80      Calcium 9.1      Glucose 90      GFR Estimate 79     CBC WITH PLATELETS AND DIFFERENTIAL - Abnormal    WBC Count 6.8      RBC Count 3.80      Hemoglobin 11.5 (*)     Hematocrit 34.0 (*)     MCV 90      MCH 30.3      MCHC 33.8      RDW 13.3      Platelet Count 282      % Neutrophils 70      % Lymphocytes 21      % Monocytes 8      % Eosinophils 1      % Basophils 0      % Immature  Granulocytes 0      NRBCs per 100 WBC 0      Absolute Neutrophils 4.7      Absolute Lymphocytes 1.4      Absolute Monocytes 0.6      Absolute Eosinophils 0.1      Absolute Basophils 0.0      Absolute Immature Granulocytes 0.0      Absolute NRBCs 0.0     HEPATIC FUNCTION PANEL - Normal    Protein Total 6.5      Albumin 4.1      Bilirubin Total 0.3      Alkaline Phosphatase 52      AST 18      ALT 22      Bilirubin Direct <0.20          Emergency Department Course & Assessments:    Interventions:  Medications   ondansetron (ZOFRAN) injection 4 mg (4 mg Intravenous $Given 5/7/23 1506)   0.9% sodium chloride BOLUS (0 mLs Intravenous Stopped 5/7/23 1701)   ondansetron (ZOFRAN) injection 4 mg (4 mg Intravenous $Given 5/7/23 1724)   sodium chloride 0.9% infusion ( Intravenous $New Bag 5/7/23 1726)   HYDROmorphone (PF) (DILAUDID) injection 0.5 mg (0.5 mg Intravenous $Given 5/7/23 1726)   Saline (61 mLs As instructed $Given 5/7/23 1813)   iopamidol (ISOVUE-370) solution 81 mL (81 mLs Intravenous $Given 5/7/23 1813)   pantoprazole (PROTONIX) IV push injection 80 mg (80 mg Intravenous $Given 5/7/23 2002)        Assessments:  1712 I obtained history and examined the patient.  1850 I returned to ED room #4 to explain CT findings and the plan going forward.  1910 I spoke again with the patient after she experienced an episode of vomiting.  1927 I spoke once again with the patient.    Independent Interpretation (X-rays, CTs, rhythm strip):  None    Consultations/Discussion of Management or Tests:  1940 I spoke on the phone with Dr. Ethan Medina, Gastroenterology.  2002 I spoke on the phone with Dr. Tay, Hospitalist.    Social Determinants of Health affecting care:   None    Disposition:  The patient was admitted to the hospital under the care of Dr. Tay.    Impression & Plan      Medical Decision Making:  Patient comes in with vomiting for 5 days unable to keep solids down but can keep water down.  Initially it sounded  like she had had a lot of nausea.  She had received 2 doses of Zofran.  She went to CT and came back and had an emesis.  She had received a liter of fluids, labs were obtained and her comprehensive panel is normal other than a low sodium of 126.  She is on thiazide diuretic.  Her white count is normal hemoglobin 11.5 and her urine is normal.  CT did not show any evidence of acute appendicitis or diverticulitis although she has diverticulosis.  No bowel obstruction or distention.  Her history that I obtained from the daughter makes me more concerned about some type of a delayed gastric emptying or partial gastric outlet obstruction.  She could have an ulcer or severe gastritis distally in the stomach.  She was given 80 of Protonix IV and I talked with Dr. Medina who feels that she should be scoped in the morning and I agree.  She will be kept n.p.o. after midnight and have talked to Dr. Tay who will be the admitting hospitalist.  We will restrict her free water and recheck her sodium in the morning.    Diagnosis:    ICD-10-CM    1. Abdominal pain, epigastric  R10.13       2. Abdominal pain, right lower quadrant  R10.31       3. Vomiting without nausea, unspecified vomiting type  R11.11       4. Hyponatremia  E87.1            Scribe Disclosure:  I, NEETA Howard am serving as a scribe at 5:14 PM on 5/7/2023 to document services personally performed by Mindy Mora MD based on my observations and the provider's statements to me.        Mindy Mora MD  05/07/23 2022

## 2023-05-07 NOTE — ED TRIAGE NOTES
Pt was seen at [facility] urgent care   Pt has lower right sided abd pain  Pt has had the pain for 5 days   Pt also has vomiting   Pt also has diarrha for 2 days

## 2023-05-08 LAB
ANION GAP SERPL CALCULATED.3IONS-SCNC: 8 MMOL/L (ref 7–15)
BUN SERPL-MCNC: 9.5 MG/DL (ref 8–23)
CALCIUM SERPL-MCNC: 8.6 MG/DL (ref 8.8–10.2)
CHLORIDE SERPL-SCNC: 98 MMOL/L (ref 98–107)
CREAT SERPL-MCNC: 0.75 MG/DL (ref 0.51–0.95)
DEPRECATED HCO3 PLAS-SCNC: 26 MMOL/L (ref 22–29)
ERYTHROCYTE [DISTWIDTH] IN BLOOD BY AUTOMATED COUNT: 13.5 % (ref 10–15)
GFR SERPL CREATININE-BSD FRML MDRD: 85 ML/MIN/1.73M2
GLUCOSE BLDC GLUCOMTR-MCNC: 85 MG/DL (ref 70–99)
GLUCOSE SERPL-MCNC: 92 MG/DL (ref 70–99)
HCT VFR BLD AUTO: 30.4 % (ref 35–47)
HGB BLD-MCNC: 10.1 G/DL (ref 11.7–15.7)
MAGNESIUM SERPL-MCNC: 1.7 MG/DL (ref 1.7–2.3)
MCH RBC QN AUTO: 30.1 PG (ref 26.5–33)
MCHC RBC AUTO-ENTMCNC: 33.2 G/DL (ref 31.5–36.5)
MCV RBC AUTO: 91 FL (ref 78–100)
PLATELET # BLD AUTO: 244 10E3/UL (ref 150–450)
POTASSIUM SERPL-SCNC: 4 MMOL/L (ref 3.4–5.3)
RBC # BLD AUTO: 3.36 10E6/UL (ref 3.8–5.2)
SODIUM SERPL-SCNC: 132 MMOL/L (ref 136–145)
TSH SERPL DL<=0.005 MIU/L-ACNC: 1.49 UIU/ML (ref 0.3–4.2)
UPPER GI ENDOSCOPY: NORMAL
WBC # BLD AUTO: 4.6 10E3/UL (ref 4–11)

## 2023-05-08 PROCEDURE — 36415 COLL VENOUS BLD VENIPUNCTURE: CPT | Performed by: HOSPITALIST

## 2023-05-08 PROCEDURE — 88305 TISSUE EXAM BY PATHOLOGIST: CPT | Mod: TC | Performed by: INTERNAL MEDICINE

## 2023-05-08 PROCEDURE — 96376 TX/PRO/DX INJ SAME DRUG ADON: CPT

## 2023-05-08 PROCEDURE — 99232 SBSQ HOSP IP/OBS MODERATE 35: CPT | Performed by: INTERNAL MEDICINE

## 2023-05-08 PROCEDURE — 250N000011 HC RX IP 250 OP 636: Performed by: HOSPITALIST

## 2023-05-08 PROCEDURE — 93010 ELECTROCARDIOGRAM REPORT: CPT | Performed by: INTERNAL MEDICINE

## 2023-05-08 PROCEDURE — G0378 HOSPITAL OBSERVATION PER HR: HCPCS

## 2023-05-08 PROCEDURE — 93005 ELECTROCARDIOGRAM TRACING: CPT | Mod: XU

## 2023-05-08 PROCEDURE — 99222 1ST HOSP IP/OBS MODERATE 55: CPT | Performed by: INTERNAL MEDICINE

## 2023-05-08 PROCEDURE — 84443 ASSAY THYROID STIM HORMONE: CPT | Performed by: INTERNAL MEDICINE

## 2023-05-08 PROCEDURE — C9113 INJ PANTOPRAZOLE SODIUM, VIA: HCPCS | Performed by: HOSPITALIST

## 2023-05-08 PROCEDURE — 250N000013 HC RX MED GY IP 250 OP 250 PS 637: Performed by: HOSPITALIST

## 2023-05-08 PROCEDURE — 80048 BASIC METABOLIC PNL TOTAL CA: CPT | Performed by: HOSPITALIST

## 2023-05-08 PROCEDURE — 83735 ASSAY OF MAGNESIUM: CPT | Performed by: HOSPITALIST

## 2023-05-08 PROCEDURE — 82962 GLUCOSE BLOOD TEST: CPT

## 2023-05-08 PROCEDURE — 85027 COMPLETE CBC AUTOMATED: CPT | Performed by: HOSPITALIST

## 2023-05-08 PROCEDURE — 43239 EGD BIOPSY SINGLE/MULTIPLE: CPT | Performed by: INTERNAL MEDICINE

## 2023-05-08 PROCEDURE — 88305 TISSUE EXAM BY PATHOLOGIST: CPT | Mod: 26 | Performed by: PATHOLOGY

## 2023-05-08 PROCEDURE — 999N000099 HC STATISTIC MODERATE SEDATION < 10 MIN: Performed by: INTERNAL MEDICINE

## 2023-05-08 PROCEDURE — 250N000011 HC RX IP 250 OP 636: Performed by: INTERNAL MEDICINE

## 2023-05-08 PROCEDURE — 258N000003 HC RX IP 258 OP 636: Performed by: HOSPITALIST

## 2023-05-08 PROCEDURE — 96375 TX/PRO/DX INJ NEW DRUG ADDON: CPT

## 2023-05-08 RX ORDER — NALOXONE HYDROCHLORIDE 0.4 MG/ML
0.2 INJECTION, SOLUTION INTRAMUSCULAR; INTRAVENOUS; SUBCUTANEOUS
Status: CANCELLED | OUTPATIENT
Start: 2023-05-08

## 2023-05-08 RX ORDER — MAGNESIUM OXIDE 400 MG/1
400 TABLET ORAL EVERY 4 HOURS
Status: DISCONTINUED | OUTPATIENT
Start: 2023-05-08 | End: 2023-05-08 | Stop reason: ALTCHOICE

## 2023-05-08 RX ORDER — FLUMAZENIL 0.1 MG/ML
0.2 INJECTION, SOLUTION INTRAVENOUS
Status: CANCELLED | OUTPATIENT
Start: 2023-05-08 | End: 2023-05-09

## 2023-05-08 RX ORDER — NALOXONE HYDROCHLORIDE 0.4 MG/ML
0.4 INJECTION, SOLUTION INTRAMUSCULAR; INTRAVENOUS; SUBCUTANEOUS
Status: CANCELLED | OUTPATIENT
Start: 2023-05-08

## 2023-05-08 RX ORDER — ACETAMINOPHEN 325 MG/1
650 TABLET ORAL EVERY 4 HOURS PRN
Status: DISCONTINUED | OUTPATIENT
Start: 2023-05-08 | End: 2023-05-10 | Stop reason: HOSPADM

## 2023-05-08 RX ORDER — LIDOCAINE 40 MG/G
CREAM TOPICAL
Status: DISCONTINUED | OUTPATIENT
Start: 2023-05-08 | End: 2023-05-08 | Stop reason: HOSPADM

## 2023-05-08 RX ORDER — LANOLIN ALCOHOL/MO/W.PET/CERES
3 CREAM (GRAM) TOPICAL AT BEDTIME
Status: DISCONTINUED | OUTPATIENT
Start: 2023-05-08 | End: 2023-05-08

## 2023-05-08 RX ORDER — MAGNESIUM SULFATE HEPTAHYDRATE 40 MG/ML
2 INJECTION, SOLUTION INTRAVENOUS ONCE
Status: COMPLETED | OUTPATIENT
Start: 2023-05-08 | End: 2023-05-08

## 2023-05-08 RX ORDER — ACETAMINOPHEN 650 MG/1
650 SUPPOSITORY RECTAL EVERY 4 HOURS PRN
Status: DISCONTINUED | OUTPATIENT
Start: 2023-05-08 | End: 2023-05-10 | Stop reason: HOSPADM

## 2023-05-08 RX ORDER — FENTANYL CITRATE 50 UG/ML
INJECTION, SOLUTION INTRAMUSCULAR; INTRAVENOUS PRN
Status: DISCONTINUED | OUTPATIENT
Start: 2023-05-08 | End: 2023-05-08 | Stop reason: HOSPADM

## 2023-05-08 RX ADMIN — SODIUM CHLORIDE: 9 INJECTION, SOLUTION INTRAVENOUS at 09:57

## 2023-05-08 RX ADMIN — PRAMIPEXOLE DIHYDROCHLORIDE 0.12 MG: 0.12 TABLET ORAL at 21:32

## 2023-05-08 RX ADMIN — PANTOPRAZOLE SODIUM 40 MG: 40 INJECTION, POWDER, FOR SOLUTION INTRAVENOUS at 10:05

## 2023-05-08 RX ADMIN — FLUTICASONE FUROATE AND VILANTEROL 1 PUFF: 200; 25 POWDER RESPIRATORY (INHALATION) at 21:42

## 2023-05-08 RX ADMIN — MELATONIN TAB 3 MG 3 MG: 3 TAB at 03:25

## 2023-05-08 RX ADMIN — ACETAMINOPHEN 650 MG: 325 TABLET ORAL at 18:29

## 2023-05-08 RX ADMIN — ONDANSETRON 4 MG: 2 INJECTION INTRAMUSCULAR; INTRAVENOUS at 08:36

## 2023-05-08 RX ADMIN — FLUOXETINE 20 MG: 20 CAPSULE ORAL at 21:32

## 2023-05-08 RX ADMIN — SODIUM CHLORIDE: 9 INJECTION, SOLUTION INTRAVENOUS at 21:33

## 2023-05-08 RX ADMIN — PANTOPRAZOLE SODIUM 40 MG: 40 INJECTION, POWDER, FOR SOLUTION INTRAVENOUS at 21:21

## 2023-05-08 RX ADMIN — ACETAMINOPHEN 650 MG: 325 TABLET ORAL at 03:25

## 2023-05-08 RX ADMIN — MAGNESIUM SULFATE HEPTAHYDRATE 2 G: 40 INJECTION, SOLUTION INTRAVENOUS at 08:43

## 2023-05-08 RX ADMIN — FLUTICASONE FUROATE AND VILANTEROL 1 PUFF: 200; 25 POWDER RESPIRATORY (INHALATION) at 01:26

## 2023-05-08 ASSESSMENT — ACTIVITIES OF DAILY LIVING (ADL)
ADLS_ACUITY_SCORE: 21
ADLS_ACUITY_SCORE: 20
ADLS_ACUITY_SCORE: 21
ADLS_ACUITY_SCORE: 20
ADLS_ACUITY_SCORE: 21
ADLS_ACUITY_SCORE: 20
ADLS_ACUITY_SCORE: 20

## 2023-05-08 NOTE — PHARMACY-ADMISSION MEDICATION HISTORY
Pharmacist Admission Medication History    Admission medication history is complete. The information provided in this note is only as accurate as the sources available at the time of the update.    Medication reconciliation/reorder completed by provider prior to medication history? Yes    Information Source(s): Patient, Family member and CareEverywhere/SureScripts via in-person    Pertinent Information: Patient's daughter will bring in Breo inhaler for inpatient use per patient request.    Also of note, patient states she threw up all of her morning medications after taking them today, 5/7/23.    Changes made to PTA medication list:    Added: Flonase, omeprazole    Deleted: cyclobenzaprine, famotidine, losartan 50 mg tabs (taking 100 mg instead), Dulera, prednisone    Changed: None    Medication Affordability:  Not including over the counter (OTC) medications, was there a time in the past 12 months when you did not take your medications as prescribed because of cost?: No    Allergies reviewed with patient and updates made in EHR: yes    Medication History Completed By: Janusz Blunt [facility] 5/7/2023 8:29 PM      Prior to Admission medications    Medication Sig Last Dose Taking? Auth Provider Long Term End Date   acetaminophen (TYLENOL) 325 MG tablet Take 325-650 mg by mouth every 6 hours as needed for mild pain Unknown at prn Yes Unknown, Entered By History     albuterol (PROAIR HFA/PROVENTIL HFA/VENTOLIN HFA) 108 (90 Base) MCG/ACT inhaler Inhale 2 puffs into the lungs every 4 hours as needed Unknown at prn Yes Reported, Patient Yes    cetirizine (ZYRTEC) 10 MG tablet Take 10 mg by mouth daily 5/7/2023 at am Yes Reported, Patient     diclofenac (VOLTAREN) 1 % topical gel Apply 2-4 g topically 3 times daily as needed Unknown at prn Yes Reported, Patient     diphenhydramine-zinc acetate (BENADRYL ITCH RELIEF STICK) 2-0.1 % external stick Apply 1 g topically as needed Unknown Yes Reported, Patient     FLUoxetine  (PROZAC) 10 MG capsule Take 10 mg by mouth daily With 20 mg capsule for total daily dose of 30 mg 5/6/2023 at pm Yes Reported, Patient Yes    FLUoxetine (PROZAC) 20 MG capsule Take 1 capsule by mouth daily With 10 mg capsule for total daily dose of 30 mg 5/6/2023 at pm Yes Reported, Patient Yes    fluticasone (FLONASE) 50 MCG/ACT nasal spray Spray 1 spray into both nostrils daily Past Week Yes Unknown, Entered By History     fluticasone-vilanterol (BREO ELLIPTA) 200-25 MCG/INH inhaler Inhale 1 puff into the lungs daily 5/6/2023 at pm Yes Reported, Patient     losartan (COZAAR) 100 MG tablet  5/7/2023 at am Yes Reported, Patient Yes    meloxicam (MOBIC) 15 MG tablet Take 1 tablet by mouth daily 5/7/2023 at am Yes Reported, Patient Yes    montelukast (SINGULAIR) 10 MG tablet  5/6/2023 at pm Yes Reported, Patient Yes    omeprazole (PRILOSEC) 20 MG DR capsule Take 20 mg by mouth daily 5/7/2023 at am Yes Unknown, Entered By History     ondansetron (ZOFRAN ODT) 4 MG ODT tab Take 1 tablet (4 mg) by mouth every 6 hours as needed for nausea  Yes Mindy Mora MD     pramipexole (MIRAPEX) 0.125 MG tablet Take 1 tablet by mouth At Bedtime 5/6/2023 at pm Yes Reported, Patient Yes    saline 0.65 % SOLN Spray 1 spray in nostril every 4 hours as needed Unknown at prn Yes Reported, Patient     triamterene-HCTZ (DYAZIDE) 37.5-25 MG capsule Take 1 capsule by mouth daily 5/7/2023 at am Yes Reported, Patient Yes

## 2023-05-08 NOTE — ED NOTES
"[facility]  ED Nurse Handoff Report    ED Chief complaint: Abdominal Pain      ED Diagnosis:   Final diagnoses:   Abdominal pain, epigastric   Abdominal pain, right lower quadrant   Vomiting without nausea, unspecified vomiting type   Hyponatremia       Code Status: Full Code    Allergies:   Allergies   Allergen Reactions     Oxycodone Nausea and Vomiting     Violent vomiting.  Violent vomiting.       Seasonal Allergies      Other reaction(s): EENT - sneezing  Hay fever     Beta Adrenergic Blockers Other (See Comments)     Contraindicated with immunotherapy     Lisinopril      Other reaction(s): Cough       Patient Story: Pt comes in with vomiting for several days as well as decreased PO intake. CT was unremarkable.   Focused Assessment:  N/V    Treatments and/or interventions provided: Protonix, Zofran, Clear liquids.  Patient's response to treatments and/or interventions: Has not vomited in several hours    To be done/followed up on inpatient unit:  EGD tomorrow    Does this patient have any cognitive concerns?: None    Activity level - Baseline/Home:  Independent  Activity Level - Current:   Independent    Patient's Preferred language: English   Needed?: No    Isolation: None  Infection: Not Applicable  Patient tested for COVID 19 prior to admission: NO  Bariatric?: No    Vital Signs:   Vitals:    05/07/23 1453 05/07/23 1743 05/07/23 1841 05/07/23 1843   BP: (!) 141/86      Pulse: 55      Resp: 16      Temp: 98  F (36.7  C)      TempSrc: Oral      SpO2: 100% 99% 97% 98%   Weight: 72.6 kg (160 lb)      Height: 1.6 m (5' 3\")          Cardiac Rhythm:     Was the PSS-3 completed:   Yes  What interventions are required if any?               Family Comments: Daughter at bedside  OBS brochure/video discussed/provided to patient/family: Yes              Name of person given brochure if not patient:               Relationship to patient:     For the majority of the shift this patient's behavior " christina Garner.   Behavioral interventions performed were None needed.    ED NURSE PHONE NUMBER: [phone]

## 2023-05-08 NOTE — PROGRESS NOTES
.RECEIVING UNIT ED HANDOFF REVIEW    ED Nurse Handoff Report was reviewed by: Long Singh RN on May 7, 2023 at 9:36 PM

## 2023-05-08 NOTE — CONSULTS
[facility]    Cardiology Consultation     Date of Admission:  5/7/2023    Review of the result(s) of each unique test - Last ECG telemetry CBC CMP.     Assessment & Plan   Jessica Ricketts is a 72 year old female who was admitted on 5/7/2023.    Asymptomatic bradycardia with ectopic atrial rhythm -ECG from yesterday shows ectopic atrial rhythm and PACs.  Subsequent ECG shows sinus rhythm with sinus arrhythmia.    Recommendations  1. Patient is asymptomatic from a cardiac standpoint.  No prior cardiac history.  Her heart rate is quite stable in the 55-60 range.  She has a ectopic atrial rhythm and some PACs.  2. Her dizziness does not appear to be cardiac in origin and it is more exacerbated by orthostasis.  3. At this time they are planning an EGD.  She can proceed with an EGD with anesthesia assistance showed her heart rate dropped she may need atropine.  Very low likelihood that she will need a temporary pacemaker wire.  If that happens please alert us immediately.  4. This seems to be asymptomatic ectopic atrial rhythm with low heart rates.  I would recommend checking TSH and an echocardiogram.  If these are abnormal then we will provide further recommendations.  5. At discharge I would anticipate that she would have a 30-day event monitor and I may have into a stress ECG study on her as an outpatient.    We will follow along tomorrow.  Please not hesitate to contact us with questions.    Moderate complexity     LAWRENCE Mejias  Staff Cardiologist  [facility]    History of Present Illness   Jessica Ricketts is a 72 year old female who presents with nausea vomiting abdominal pain.  Patient denies any prior cardiovascular history.  She says her dad had a pacemaker in the 70s.  No thyroid problems reported.  No smoking alcohol use.  No diabetes or no CAD reported.  Patient has a history of hypertension.  She says many years ago she used to be on a beta-blocker but that was taken away  because she was feeling fatigued with that.    Now she presented to the hospital with 5 days of dehydration not feeling well fatigued and tired.  She was seen by GI and they are planning a endoscopy.    Last night at 3 AM she was noted to have low heart rates in the 40s to 50s.  Medicine attending was called.  ECG showed ectopic atrial rhythm but less than 2-second pauses.  She had some PACs and PVCs.  She was put on telemetry.  Cardiology has been consulted.    Patient denies any similar history of that in the past.  She reports no sort of cardiac symptoms.  She says she has been orthostatic and dizzy for the last 5 days predominantly related to her dehydration.  She says IV fluids have significantly helped.    Past Medical History   Past Medical History:   Diagnosis Date     Allergic asthma, mild intermittent, with acute exacerbation 3/3/2012     Allergic rhinitis 10/7/2019    Formatting of this note might be different from the original. 10/7/2019 - Dr. Reddy A: Perennial and seasonal rhinitis, poorly controlled with Flonase, Allegra and Singulair. Allergy skin tests show strongest reactivity to cat dander, guinea pigs, ragweed; moderate reactivity to dog dander, dust mites, molds and tree pollens.   P -Reviewed with patient perennial and seasonal allergen avoidance and     Benign neoplasm of colon 12/30/2003     Bilateral carpal tunnel syndrome 5/7/2023     Candidiasis 10/19/2018     Essential hypertension, benign 10/31/2002     Gastroesophageal reflux disease with esophagitis 9/13/2001     History of total knee replacement 11/17/2014    Last Assessment & Plan:  Formatting of this note might be different from the original. 69yoF with h/o right TKA in Nov 2014 with new fall onto right knee 2-3 weeks ago with increased pain. Imaging negative for fracture or hardware failure, and pain is improving.   -Reassured TKA is in place without loosening or fractures noted  -Continue activities as tolerated  -Continue  exercises for ROM, streng     Hypersomnia with sleep apnea 11/1/2007     Mild single current episode of major depressive disorder (H) 12/16/2017     Moderate persistent asthma without complication 6/12/2019    Formatting of this note might be different from the original. 10/7/2019 - Dr. Reddy  A: recurrent cough and wheeze not well controlled with Flovent 110. Allergy skin tests shows reactivity to multiple perennial and seasonal allergens that can exacerbate asthma control. Monitor for improvement with allergen desensitization.   P -Reviewed with patient the signs of asthma control include no cough or      Myopia 5/1/2008     BETO (obstructive sleep apnea) 5/7/2023     Osteopenia determined by x-ray 6/8/2018     Presbyopia 5/1/2008     Recurrent major depressive disorder, in partial remission (H) 11/6/2020     Restless leg syndrome 2/10/2019     Sensorineural hearing loss (SNHL) of left ear with unrestricted hearing of right ear 9/21/2022     Spondylolisthesis at L5-S1 level 11/1/2017       Past Surgical History   Past Surgical History:   Procedure Laterality Date     TOTAL KNEE ARTHROPLASTY         Prior to Admission Medications   Prior to Admission Medications   Prescriptions Last Dose Informant Patient Reported? Taking?   FLUoxetine (PROZAC) 10 MG capsule 5/6/2023 at pm  Yes Yes   Sig: Take 10 mg by mouth daily With 20 mg capsule for total daily dose of 30 mg   FLUoxetine (PROZAC) 20 MG capsule 5/6/2023 at pm  Yes Yes   Sig: Take 1 capsule by mouth daily With 10 mg capsule for total daily dose of 30 mg   acetaminophen (TYLENOL) 325 MG tablet Unknown at prn  Yes Yes   Sig: Take 325-650 mg by mouth every 6 hours as needed for mild pain   albuterol (PROAIR HFA/PROVENTIL HFA/VENTOLIN HFA) 108 (90 Base) MCG/ACT inhaler Unknown at prn  Yes Yes   Sig: Inhale 2 puffs into the lungs every 4 hours as needed   cetirizine (ZYRTEC) 10 MG tablet 5/7/2023 at am  Yes Yes   Sig: Take 10 mg by mouth daily   diclofenac (VOLTAREN) 1  % topical gel Unknown at prn  Yes Yes   Sig: Apply 2-4 g topically 3 times daily as needed   diphenhydramine-zinc acetate (BENADRYL ITCH RELIEF STICK) 2-0.1 % external stick Unknown  Yes Yes   Sig: Apply 1 g topically as needed   fluticasone (FLONASE) 50 MCG/ACT nasal spray Past Week  Yes Yes   Sig: Spray 1 spray into both nostrils daily   fluticasone-vilanterol (BREO ELLIPTA) 200-25 MCG/INH inhaler 5/6/2023 at pm  Yes Yes   Sig: Inhale 1 puff into the lungs daily   losartan (COZAAR) 100 MG tablet 5/7/2023 at am  Yes Yes   Sig: Take 100 mg by mouth daily   meloxicam (MOBIC) 15 MG tablet 5/7/2023 at am  Yes Yes   Sig: Take 1 tablet by mouth daily   montelukast (SINGULAIR) 10 MG tablet 5/6/2023 at pm  Yes Yes   Sig: Take 10 mg by mouth At Bedtime   omeprazole (PRILOSEC) 20 MG DR capsule 5/7/2023 at am  Yes Yes   Sig: Take 20 mg by mouth daily   pramipexole (MIRAPEX) 0.125 MG tablet 5/6/2023 at pm  Yes Yes   Sig: Take 1 tablet by mouth At Bedtime   saline 0.65 % SOLN Unknown at prn  Yes Yes   Sig: Spray 1 spray in nostril every 4 hours as needed   triamterene-HCTZ (DYAZIDE) 37.5-25 MG capsule 5/7/2023 at am  Yes Yes   Sig: Take 1 capsule by mouth daily      Facility-Administered Medications: None     Current Facility-Administered Medications   Medication Dose Route Frequency     cetirizine  10 mg Oral Daily     FLUoxetine  20 mg Oral At Bedtime     fluticasone-vilanterol  1 puff Inhalation QPM     losartan  50 mg Oral Daily     melatonin  3 mg Oral At Bedtime     pantoprazole  40 mg Intravenous BID     pramipexole  0.125 mg Oral At Bedtime     sodium chloride (PF)  3 mL Intracatheter Q8H     Current Facility-Administered Medications   Medication Last Rate     sodium chloride 100 mL/hr at 05/08/23 0957     Allergies   Allergies   Allergen Reactions     Oxycodone Nausea and Vomiting     Violent vomiting.  Violent vomiting.       Seasonal Allergies      Other reaction(s): EENT - sneezing  Hay fever     Beta Adrenergic  "Blockers Other (See Comments)     Contraindicated with immunotherapy     Lisinopril      Other reaction(s): Cough       Social History    reports that she has never smoked. She has never used smokeless tobacco. She reports current alcohol use. She reports that she does not use drugs.    Family History            Review of Systems   A comprehensive review of system was performed and is negative other than that noted in the HPI or here.     Physical Exam   Vital Signs with Ranges  Temp:  [98  F (36.7  C)-99.1  F (37.3  C)] 99.1  F (37.3  C)  Pulse:  [50-68] 68  Resp:  [16-18] 16  BP: (105-141)/(65-88) 123/88  SpO2:  [97 %-100 %] 99 %  Wt Readings from Last 4 Encounters:   05/07/23 73.5 kg (162 lb 0.6 oz)   08/20/22 70.3 kg (155 lb)   08/20/22 70.3 kg (155 lb)     No intake/output data recorded.      Vitals: /88   Pulse 68   Temp 99.1  F (37.3  C) (Oral)   Resp 16   Ht 1.6 m (5' 3\")   Wt 73.5 kg (162 lb 0.6 oz)   SpO2 99%   BMI 28.70 kg/m      Physical Exam:   General - Alert and in no acute distress  Respiratory -clear to auscultation  Cardiovascular -normal regular heart sounds without murmur rubs or gallops no edema  Gastrointestinal - Non distended  Psych - Appropriate affect     No lab results found in last 7 days.    Invalid input(s): TROPONINIES    Recent Labs   Lab 05/08/23  0452 05/08/23  0348 05/07/23  1500   WBC 4.6  --  6.8   HGB 10.1*  --  11.5*   MCV 91  --  90     --  282   *  --  126*   POTASSIUM 4.0  --  3.9   CHLORIDE 98  --  88*   CO2 26  --  28   BUN 9.5  --  17.0   CR 0.75  --  0.80   GFRESTIMATED 85  --  79   ANIONGAP 8  --  10   JORGE 8.6*  --  9.1   GLC 92 85 90   ALBUMIN  --   --  4.1   PROTTOTAL  --   --  6.5   BILITOTAL  --   --  0.3   ALKPHOS  --   --  52   ALT  --   --  22   AST  --   --  18     No results for input(s): CHOL, HDL, LDL, TRIG, CHOLHDLRATIO in the last 87463 hours.  Recent Labs   Lab 05/08/23  0452 05/07/23  1500   WBC 4.6 6.8   HGB 10.1* 11.5*   HCT " 30.4* 34.0*   MCV 91 90    282     No results for input(s): PH, PHV, PO2, PO2V, SAT, PCO2, PCO2V, HCO3, HCO3V in the last 168 hours.  No results for input(s): NTBNPI, NTBNP in the last 168 hours.  No results for input(s): DD in the last 168 hours.  No results for input(s): SED, CRP in the last 168 hours.  Recent Labs   Lab 05/08/23  0452 05/07/23  1500    282     No results for input(s): TSH in the last 168 hours.  Recent Labs   Lab 05/07/23  1459   COLOR Straw   APPEARANCE Clear   URINEGLC Negative   URINEBILI Negative   URINEKETONE Trace*   SG 1.005   UBLD Negative   URINEPH 7.5*   PROTEIN Negative   NITRITE Negative   LEUKEST Negative       Imaging:  Recent Results (from the past 48 hour(s))   CT Abdomen Pelvis w Contrast    Narrative    EXAM: CT ABDOMEN PELVIS W CONTRAST  LOCATION: [facility]  DATE/TIME: 5/7/2023 6:29 PM CDT    INDICATION: Right lower quadrant abdominal pain for 4 days, vomiting.  Evaluate for appendicitis or other pathology  COMPARISON: None.  TECHNIQUE: CT scan of the abdomen and pelvis was performed following injection of IV contrast. Multiplanar reformats were obtained. Dose reduction techniques were used.  CONTRAST: 81mL Isovue 370        FINDINGS:   LOWER CHEST: Dependent atelectasis both lower lobes.    HEPATOBILIARY: Normal.    PANCREAS: Normal.    SPLEEN: Normal.    ADRENAL GLANDS: Normal.    KIDNEYS/BLADDER: Small cyst lower pole right kidney. No hydronephrosis. Moderately distended bladder.    BOWEL: Normal appendix. No evidence for bowel obstruction. Diverticula sigmoid colon, without evidence for diverticulitis.     LYMPH NODES: Normal.    VASCULATURE: Unremarkable.    PELVIC ORGANS: Normal.    MUSCULOSKELETAL: Advanced degenerative disc disease lower thoracic and lumbar spine.      Impression    IMPRESSION:   1.  Normal appendix.  2.  No evidence for bowel obstruction.   3.  Sigmoid diverticulosis.  4.  No findings to account for the  "patient's symptoms.       Echo:  No results found for this or any previous visit (from the past 4320 hour(s)).    Clinically Significant Risk Factors Present on Admission         # Hyponatremia: Lowest Na = 126 mmol/L in last 2 days, will monitor as appropriate          # Hypertension: home medication list includes antihypertensive(s)      # Overweight: Estimated body mass index is 28.7 kg/m  as calculated from the following:    Height as of this encounter: 1.6 m (5' 3\").    Weight as of this encounter: 73.5 kg (162 lb 0.6 oz).           This note was completed in part using dictation via the Dragon voice recognition software. Some word and grammatical errors may occur and must be interpreted in the appropriate clinical context.  If there are any questions pertaining to this issue, please contact me for further clarification.      "

## 2023-05-08 NOTE — PLAN OF CARE
Goal Outcome Evaluation:       Orientation/Cognitive:alert and oriented  Observation Goals (Met/ Not Met):n  Mobility Level/Assist Equipment: SBA  Fall Risk (Y/N):y  Behavior Concerns: n  Pain Managementn  Tele/VS/O2: vss  ABNL Lab/BG: wnl  Diet: cl liquid,npo after midnight  Bowel/Bladder: continent  Skin Concerns: n  Drains/Devices: n  Tests/Procedures for next shift:egd  Anticipated DC date & active delays:TBD  Patient Stated Goal for Today: y

## 2023-05-08 NOTE — H&P
[facility]    History and Physical - Hospitalist Service       Date of Admission:  5/7/2023    Assessment & Plan    Jessica Ricketts is a 72 year old female with a history of GERD on omeprazole who uses meloxicam on a daily basis.  For the past 5 days she has had epigastric pain which is worsened with eating and has also been vomiting.  CT scan did not show any acute findings.  She has no fever and a normal white blood cell count.  She has a nonsurgical abdomen.  After receiving IV hydromorphone she has mild epigastric tenderness on exam.  In the emergency room she was treated with IV fluid and pantoprazole.  An additional finding was hyponatremia.    Epigastric pain and vomiting  History of gastroesophageal reflux disease   Her symptoms are suspicious for a gastric ulcer.  She uses meloxicam daily.    Admit to observation    Clear liquids then n.p.o. after midnight    IV pantoprazole    Consult GI for possible EGD    Stop NSAIDs    Hyponatremia   I suspect this is acute on chronic.  She is on hydrochlorothiazide and SSRI and now has been vomiting.    Hold her diuretics    Continue with normal saline IV    Repeat serum sodium tomorrow    Hypertension     Continue losartan    Hold triamterene hydrochlorothiazide due to hyponatremia vomiting/dehydration    Asthma   Asymptomatic    Continue prior to admission inhaled bronchodilators    Restless legs syndrome     Continue pramipexole    Depression     Continue fluoxetine       Diet:  clears, NPO after MN  DVT Prophylaxis: Low Risk/Ambulatory with no VTE prophylaxis indicated  Alexander Catheter: Not present  Lines: None     Cardiac Monitoring: None  Code Status:   FULL    Clinically Significant Risk Factors Present on Admission         # Hyponatremia: Lowest Na = 126 mmol/L in last 2 days, will monitor as appropriate          # Hypertension: home medication list includes antihypertensive(s)      # Overweight: Estimated body mass index is 28.34 kg/m   "as calculated from the following:    Height as of this encounter: 1.6 m (5' 3\").    Weight as of this encounter: 72.6 kg (160 lb).           Disposition Plan      Expected Discharge Date: 05/08/2023                  Jacob Tay MD  Hospitalist Service  [facility]  Securely message with [employer] (more info)  Text page via [facility] Paging/Directory     ______________________________________________________________________    Chief Complaint   Epigastric pain with vomiting    History is obtained from the patient, electronic health record and emergency department physician    History of Present Illness   Jessica Ricketts is a 72 year old female who has a history of GERD for which she takes omeprazole 20 mg daily.  In addition she is on meloxicam daily.  She says that she was feeling fine until about 5 days ago when she developed epigastric area pain worsened with eating.  She was also vomiting after she ate.  She has had some loose stools.  Her oral intake has been poor over the past 5 days.  Today she went to urgent care and they felt that she had right lower quadrant abdominal pain and suspected appendicitis.  They told her to go to the hospital to be seen and have a CT scan.  In the emergency room today her vital signs were stable.  On exam she was described as having epigastric and right lower quadrant tenderness with guarding.  Labs notable for sodium 126 otherwise unremarkable.  CT scan showed sigmoid diverticulosis but no acute findings.  UA is normal.  The patient was given IV Zofran IV normal saline and IV hydromorphone IV pantoprazole and is being admitted for further evaluation of her pain.  She has not been able to keep down solid food for the past few days and has been taking in liquids only.    Past Medical History    Past Medical History:   Diagnosis Date     Allergic asthma, mild intermittent, with acute exacerbation 3/3/2012     Allergic rhinitis 10/7/2019    Formatting of " this note might be different from the original. 10/7/2019 - Dr. Reddy A: Perennial and seasonal rhinitis, poorly controlled with Flonase, Allegra and Singulair. Allergy skin tests show strongest reactivity to cat dander, guinea pigs, ragweed; moderate reactivity to dog dander, dust mites, molds and tree pollens.   P -Reviewed with patient perennial and seasonal allergen avoidance and     Benign neoplasm of colon 12/30/2003     Bilateral carpal tunnel syndrome 5/7/2023     Candidiasis 10/19/2018     Essential hypertension, benign 10/31/2002     Gastroesophageal reflux disease with esophagitis 9/13/2001     History of total knee replacement 11/17/2014    Last Assessment & Plan:  Formatting of this note might be different from the original. 69yoF with h/o right TKA in Nov 2014 with new fall onto right knee 2-3 weeks ago with increased pain. Imaging negative for fracture or hardware failure, and pain is improving.   -Reassured TKA is in place without loosening or fractures noted  -Continue activities as tolerated  -Continue exercises for ROM, streng     Hypersomnia with sleep apnea 11/1/2007     Mild single current episode of major depressive disorder (H) 12/16/2017     Moderate persistent asthma without complication 6/12/2019    Formatting of this note might be different from the original. 10/7/2019 - Dr. Reddy  A: recurrent cough and wheeze not well controlled with Flovent 110. Allergy skin tests shows reactivity to multiple perennial and seasonal allergens that can exacerbate asthma control. Monitor for improvement with allergen desensitization.   P -Reviewed with patient the signs of asthma control include no cough or      Myopia 5/1/2008     BETO (obstructive sleep apnea) 5/7/2023     Osteopenia determined by x-ray 6/8/2018     Presbyopia 5/1/2008     Recurrent major depressive disorder, in partial remission (H) 11/6/2020     Restless leg syndrome 2/10/2019     Sensorineural hearing loss (SNHL) of left ear with  unrestricted hearing of right ear 9/21/2022     Spondylolisthesis at L5-S1 level 11/1/2017       Past Surgical History   Past Surgical History:   Procedure Laterality Date     TOTAL KNEE ARTHROPLASTY         Prior to Admission Medications   Prior to Admission Medications   Prescriptions Last Dose Informant Patient Reported? Taking?   FLUoxetine (PROZAC) 10 MG capsule   Yes Yes   FLUoxetine (PROZAC) 20 MG capsule   Yes No   Sig: Take 1 capsule by mouth daily   acetaminophen (TYLENOL) 500 MG tablet   Yes No   Sig: Take 500 mg by mouth   albuterol (PROAIR HFA/PROVENTIL HFA/VENTOLIN HFA) 108 (90 Base) MCG/ACT inhaler   Yes No   Sig: Inhale 2 puffs into the lungs   cetirizine (ZYRTEC) 10 MG tablet   Yes No   Sig: Take 10 mg by mouth daily   cyclobenzaprine (FLEXERIL) 10 MG tablet   Yes No   Sig: Take 10 mg by mouth daily   diclofenac (VOLTAREN) 1 % topical gel   Yes No   Sig: Place 2-4 g onto the skin   diphenhydramine-zinc acetate (BENADRYL ITCH RELIEF STICK) 2-0.1 % external stick   Yes No   Sig: Apply 1 g topically   famotidine (PEPCID) 20 MG tablet   Yes No   Sig: Take 1 tablet by mouth 2 times daily   fluticasone-vilanterol (BREO ELLIPTA) 200-25 MCG/INH inhaler   Yes No   Sig: Inhale 1 puff into the lungs   losartan (COZAAR) 100 MG tablet   Yes Yes   losartan (COZAAR) 50 MG tablet   Yes No   Sig: Take 1 tablet by mouth daily   meloxicam (MOBIC) 15 MG tablet   Yes No   Sig: Take 1 tablet by mouth daily   mometasone-formoterol (DULERA) 200-5 MCG/ACT inhaler   Yes Yes   Sig: Indications: AsthmaPrior to 1st use, release 4 test sprays in the air to prime inhaler. Shake well for 5 seconds before each use  2 puffs twice daily for maintenance. During an asthma flare, take 2 puffs every 4 hours (maximum of 12 puffs/d)   montelukast (SINGULAIR) 10 MG tablet   Yes Yes   pramipexole (MIRAPEX) 0.125 MG tablet   Yes No   Sig: Take 1 tablet by mouth At Bedtime   predniSONE (DELTASONE) 20 MG tablet   Yes No   Sig: Take 20 mg by  mouth   saline 0.65 % SOLN   Yes No   Sig: Use 2 sprays in each nostril every 4-6 hours for dryness.   triamterene-HCTZ (DYAZIDE) 37.5-25 MG capsule   Yes No   Sig: Take 1 capsule by mouth daily      Facility-Administered Medications: None        Review of Systems    The 10 point Review of Systems is negative other than noted in the HPI or here.     Social History   I have reviewed this patient's social history and updated it with pertinent information if needed.  Social History     Tobacco Use     Smoking status: Never     Smokeless tobacco: Never   Substance Use Topics     Alcohol use: Yes     Drug use: Never       Allergies   Allergies   Allergen Reactions     Oxycodone Nausea and Vomiting     Violent vomiting.  Violent vomiting.       Seasonal Allergies      Other reaction(s): EENT - sneezing  Hay fever     Beta Adrenergic Blockers Other (See Comments)     Contraindicated with immunotherapy     Lisinopril      Other reaction(s): Cough        Physical Exam   Vital Signs: Temp: 98  F (36.7  C) Temp src: Oral BP: (!) 141/86 Pulse: 55   Resp: 16 SpO2: 98 % O2 Device: None (Room air)    Weight: 160 lbs 0 oz    Constitutional: awake, alert, cooperative, no apparent distress  Eyes: Lids and lashes normal, pupils equal, round, sclera clear, conjunctiva normal  ENT: Normocephalic, without obvious abnormality, atraumatic  Respiratory: No increased work of breathing, good air exchange, clear to auscultation bilaterally, no crackles or wheezing  Cardiovascular: regular rate and rhythm, normal S1 and S2, no S3 or S4, and no murmur noted  GI:  Soft and nondistended.  She has slightly hypoactive bowel sounds.  She has mild epigastric tenderness without any rebound or guarding after receiving hydromorphone.  She has no discernible right upper quadrant left lower quadrant or right lower quadrant tenderness.  There is no palpable mass or hernia.  Neurologic: Awake, alert, oriented to name, place and time.  Cranial nerves II-XII  are grossly intact.  Motor is 5 out of 5 bilaterally.  Neuropsychiatric: General: normal, calm and normal eye contact    Medical Decision Making       MANAGEMENT DISCUSSED with the following over the past 24 hours: Patient   NOTE(S)/MEDICAL RECORDS REVIEWED over the past 24 hours: EPIC      Data     I have personally reviewed the following data over the past 24 hrs:    6.8  \   11.5 (L)   / 282     126 (L) 88 (L) 17.0 /  90   3.9 28 0.80 \       ALT: 22 AST: 18 AP: 52 TBILI: 0.3   ALB: 4.1 TOT PROTEIN: 6.5 LIPASE: N/A       Imaging results reviewed over the past 24 hrs:   Recent Results (from the past 24 hour(s))   CT Abdomen Pelvis w Contrast    Narrative    EXAM: CT ABDOMEN PELVIS W CONTRAST  LOCATION: [facility]  DATE/TIME: 5/7/2023 6:29 PM CDT    INDICATION: Right lower quadrant abdominal pain for 4 days, vomiting.  Evaluate for appendicitis or other pathology  COMPARISON: None.  TECHNIQUE: CT scan of the abdomen and pelvis was performed following injection of IV contrast. Multiplanar reformats were obtained. Dose reduction techniques were used.  CONTRAST: 81mL Isovue 370        FINDINGS:   LOWER CHEST: Dependent atelectasis both lower lobes.    HEPATOBILIARY: Normal.    PANCREAS: Normal.    SPLEEN: Normal.    ADRENAL GLANDS: Normal.    KIDNEYS/BLADDER: Small cyst lower pole right kidney. No hydronephrosis. Moderately distended bladder.    BOWEL: Normal appendix. No evidence for bowel obstruction. Diverticula sigmoid colon, without evidence for diverticulitis.     LYMPH NODES: Normal.    VASCULATURE: Unremarkable.    PELVIC ORGANS: Normal.    MUSCULOSKELETAL: Advanced degenerative disc disease lower thoracic and lumbar spine.      Impression    IMPRESSION:   1.  Normal appendix.  2.  No evidence for bowel obstruction.   3.  Sigmoid diverticulosis.  4.  No findings to account for the patient's symptoms.

## 2023-05-08 NOTE — PROGRESS NOTES
[facility]    Medicine Progress Note - Hospitalist Service    Date of Admission:  5/7/2023    Assessment & Plan   ej Ricketts is a 72 year old female with a history of GERD on omeprazole who uses meloxicam on a daily basis.  For the past 5 days she has had epigastric pain which is worsened with eating and has also been vomiting.  CT scan did not show any acute findings.  She has no fever and a normal white blood cell count.  She has a nonsurgical abdomen.  After receiving IV hydromorphone she has mild epigastric tenderness on exam.  In the emergency room she was treated with IV fluid and pantoprazole.  An additional finding was hyponatremia.     Epigastric pain and vomiting  History of gastroesophageal reflux disease   Her symptoms are suspicious for a gastric ulcer.  She uses meloxicam daily. CT abd/pelvis result reviewed, no acute finding to explain symptoms.  - continue NPO  - continue IV PPI and IVF  - GI has evaluated, plan for EGD today. Ok from cards stand point to proceed with EGD with anesthesia assistance. May need atropine if HR dropped    Hyponatremia   I suspect this is acute on chronic.  She is on hydrochlorothiazide and SSRI and now has been vomiting. Suspect hypovolemia from poor oral intake and vomiting.   - Na has improved to 132  - Hold her diuretics  - Continue with normal saline IV  - f/u BMP in AM    Bradycardia  Noted to have bradycardia overnight of 5/7-5/8.  Cardiology reviewed EKG and braycardia with ectopic atrial rhythm. Per cardiology, this is asymtomatic and dizziness does not appear cardiac in origin  - TSH is normal range  - f/u echocardiogram  - cardiology following, appreciate assistance    Dizziness  Noted overnight of 5/7-5/8. Per cardiology, less likely cardiac in origin. Dizziness is improved today.  Strength is symmetrical in upper ext, no facial asymmetry. F-N intact, alternating hand moement in tact.  - check orthostatics  - monitor, f/u  "echo     Hypertension   - Continue losartan with hold parameters  - Hold triamterene hydrochlorothiazide due to hyponatremia vomiting/dehydration     Asthma   Asymptomatic    Continue prior to admission inhaled bronchodilators     Restless legs syndrome     Continue pramipexole     Depression     Continue fluoxetine       Diet: NPO per Anesthesia Guidelines for Procedure/Surgery Except for: No Exceptions    DVT Prophylaxis: Pneumatic Compression Devices  Alexander Catheter: Not present  Lines: None     Cardiac Monitoring: ACTIVE order. Indication: Bradycardias (48 hours)  Code Status: Full Code      Clinically Significant Risk Factors Present on Admission         # Hyponatremia: Lowest Na = 126 mmol/L in last 2 days, will monitor as appropriate          # Hypertension: home medication list includes antihypertensive(s)      # Overweight: Estimated body mass index is 28.7 kg/m  as calculated from the following:    Height as of this encounter: 1.6 m (5' 3\").    Weight as of this encounter: 73.5 kg (162 lb 0.6 oz).           Disposition Plan      Expected Discharge Date: 05/10/2023                  Lita Martinez MD  Hospitalist Service  [facility]  Securely message with [employer] (more info)  Text page via [employer] Paging/Directory   ______________________________________________________________________    Interval History   Overnight events noted-patient transferred to CCU for bradycardia. Refer to cross cover note. She felt dizzy and noted to be bradycardic. EKG at the time shows ismael cardia and some pause.  This morning, patient feels dizziness is better, she just feels hungry. No focal weakness.   States she has not ate much the last few days because vomiting and abdominal pain on the right side.  She had episode of left lower abdominal pain this morning which has since resolved.   She is currently NPO.     Physical Exam   Vital Signs: Temp: 99.1  F (37.3  C) Temp src: Oral BP: 123/88 Pulse: " 68   Resp: 16 SpO2: 99 % O2 Device: None (Room air)    Weight: 162 lbs .61 oz    General Appearance: Alert, awake and no apparent distress  Respiratory: CTAB, now wheezing  Cardiovascular: regular rate and rhythm  GI: soft, mild tenderness in right upper qud  Skin: warm and dry  Other: strength is symmetrical in b/l UE, no facial asymetry, F-N intact, alternating hand movement intact     Medical Decision Making       45 MINUTES SPENT BY ME on the date of service doing chart review, history, exam, documentation & further activities per the note.  MANAGEMENT DISCUSSED with the following over the past 24 hours: patient, daughter, RN   NOTE(S)/MEDICAL RECORDS REVIEWED over the past 24 hours: cardiology note, GI note  Tests personally interpreted in the past 24 hours:  - EKG and CT abd showing as above      Data     I have personally reviewed the following data over the past 24 hrs:    4.6  \   10.1 (L)   / 244     132 (L) 98 9.5 /  92   4.0 26 0.75 \       ALT: 22 AST: 18 AP: 52 TBILI: 0.3   ALB: 4.1 TOT PROTEIN: 6.5 LIPASE: N/A       TSH: 1.49 T4: N/A A1C: N/A       Imaging results reviewed over the past 24 hrs:   Recent Results (from the past 24 hour(s))   CT Abdomen Pelvis w Contrast    Narrative    EXAM: CT ABDOMEN PELVIS W CONTRAST  LOCATION: [facility]  DATE/TIME: 5/7/2023 6:29 PM CDT    INDICATION: Right lower quadrant abdominal pain for 4 days, vomiting.  Evaluate for appendicitis or other pathology  COMPARISON: None.  TECHNIQUE: CT scan of the abdomen and pelvis was performed following injection of IV contrast. Multiplanar reformats were obtained. Dose reduction techniques were used.  CONTRAST: 81mL Isovue 370        FINDINGS:   LOWER CHEST: Dependent atelectasis both lower lobes.    HEPATOBILIARY: Normal.    PANCREAS: Normal.    SPLEEN: Normal.    ADRENAL GLANDS: Normal.    KIDNEYS/BLADDER: Small cyst lower pole right kidney. No hydronephrosis. Moderately distended bladder.    BOWEL:  Normal appendix. No evidence for bowel obstruction. Diverticula sigmoid colon, without evidence for diverticulitis.     LYMPH NODES: Normal.    VASCULATURE: Unremarkable.    PELVIC ORGANS: Normal.    MUSCULOSKELETAL: Advanced degenerative disc disease lower thoracic and lumbar spine.      Impression    IMPRESSION:   1.  Normal appendix.  2.  No evidence for bowel obstruction.   3.  Sigmoid diverticulosis.  4.  No findings to account for the patient's symptoms.

## 2023-05-08 NOTE — PROVIDER NOTIFICATION
short stay [address],bet    low HR(44-50), within baseline per pt report new onset   of  dizziness.VSS on RA, BP on low 100s  jude easton  [phone]

## 2023-05-08 NOTE — SIGNIFICANT EVENT
"Significant Event Note    Time of event: 2:34 AM May 8, 2023    Description of event:  Paged for new onset dizziness. She is bradycardic.    Plan:  STAT EKG ordered    Discussed with: bedside nurse    Valentino Box MD      3 AM Addendum:    EKG shows irregular narrow complex bradycardia with significant pauses and PVC's. Seen and examined at the bedside. She currently fels dizzy and has a headache. Her nausea has improved. She takes no AV suzanne blockers (she is on ARB and thiazides). She denies any prior history of CAD, arrhythmia, CHF, or stroke.    Blood pressure 105/65, pulse 66, temperature 98  F (36.7  C), temperature source Oral, resp. rate 18, height 1.6 m (5' 3\"), weight 73.5 kg (162 lb 0.6 oz), SpO2 97 %.    Gen A and O times 3  CV irregular bradycardic S1 S2  RESP CTAF B    Overall, suspect she could have symptomatic bradycardia as a cause of her  symptoms. High grade heart block has not been ruled out.     Plan [place] start [place] and [place] [facility] BMP and Mag levels ordered. TTE ordered. Cardiology consulted. Monitor BP very closely (currently normotensive).    "

## 2023-05-08 NOTE — CONSULTS
72 year old female with a history of GERD on omeprazole who uses meloxicam on a daily basis.  For the past 5 days she has had epigastric pain which is worsened with eating and has also been vomiting.  CT scan did not show any acute findings.  She has no fever and a normal white blood cell count.  She has a nonsurgical abdomen.  NPO  I?V fluids  I?V Protonix and zofran  Plan for EGD tomorrow    Ethan Medina MD FACP  [facility]

## 2023-05-08 NOTE — PROGRESS NOTES
A&O x4. VSS on room air. Tele SR w/ 1 degree AVB, PACs. Frequent pauses, <2 seconds. Denies CP/SOB. Nausea and vomiting has resolved today. C/o mild L sided abdominal pain, MD aware. Up with standby assist. Plan for ECHO tomorrow. Daughter updated on current plan of care.

## 2023-05-08 NOTE — CONSULTS
[facility]  Gastroenterology Consultation         Jessica Ricketts  [address] [facility]   [address]  [place] [address]  72 year old female    Admission Date/Time: 5/7/2023  Primary Care Provider: Eugene Rush  Referring / Attending Physician:  Dr. Jacob Tay    We were asked to see the patient in consultation by Dr. Jacob Tay for evaluation of nausea and vomtiing.      CC: nausea and vomiting    HPI:  Jessica Ricketts is a 72 year old female who has a past medical history of GERD on daily omeprazole, takes daily meloxicam for joint pain and presented to ED on 5/7/23 with c/o 5 days of nausea and vomiting. Unable to tolerate any oral intake and developed epigastric and RLQ pain. She was seen in urgent care yesterday as well and provider there had significant concern for appendicitis. She was evaluated with CT scan and labs. Has had no leukocytosis. CT was negative for appendicitis or diverticulitis.     She has had no prior episodes of lengthy nausea and vomiting. She did report mildly loose stools 4-5 days ago but none since and also not able to keep any food down. She has had no noted blood in stools. Pain in abdomen has changed location from RLQ and epigastric now with LUQ pain. Has no known history of PUD, no known sick exposures, no recent travel. Did recently see PCP and had antihypertensive adjusted.    Last night had RRT called due to bradycardia and dizziness. EKG ordered, TTE ordered, and cardiology consulted.    ROS: A comprehensive ten point review of systems was negative aside from those in mentioned in the HPI.      PAST MED HX:  I have reviewed this patient's medical history and updated it with pertinent information if needed.   Past Medical History:   Diagnosis Date     Allergic asthma, mild intermittent, with acute exacerbation 3/3/2012     Allergic rhinitis 10/7/2019    Formatting of this note might be different from the original. 10/7/2019 - Dr. Reddy  A: Perennial and seasonal rhinitis, poorly controlled with Flonase, Allegra and Singulair. Allergy skin tests show strongest reactivity to cat dander, guinea pigs, ragweed; moderate reactivity to dog dander, dust mites, molds and tree pollens.   P -Reviewed with patient perennial and seasonal allergen avoidance and     Benign neoplasm of colon 12/30/2003     Bilateral carpal tunnel syndrome 5/7/2023     Candidiasis 10/19/2018     Essential hypertension, benign 10/31/2002     Gastroesophageal reflux disease with esophagitis 9/13/2001     History of total knee replacement 11/17/2014    Last Assessment & Plan:  Formatting of this note might be different from the original. 69yoF with h/o right TKA in Nov 2014 with new fall onto right knee 2-3 weeks ago with increased pain. Imaging negative for fracture or hardware failure, and pain is improving.   -Reassured TKA is in place without loosening or fractures noted  -Continue activities as tolerated  -Continue exercises for ROM, streng     Hypersomnia with sleep apnea 11/1/2007     Mild single current episode of major depressive disorder (H) 12/16/2017     Moderate persistent asthma without complication 6/12/2019    Formatting of this note might be different from the original. 10/7/2019 - Dr. Reddy  A: recurrent cough and wheeze not well controlled with Flovent 110. Allergy skin tests shows reactivity to multiple perennial and seasonal allergens that can exacerbate asthma control. Monitor for improvement with allergen desensitization.   P -Reviewed with patient the signs of asthma control include no cough or      Myopia 5/1/2008     BETO (obstructive sleep apnea) 5/7/2023     Osteopenia determined by x-ray 6/8/2018     Presbyopia 5/1/2008     Recurrent major depressive disorder, in partial remission (H) 11/6/2020     Restless leg syndrome 2/10/2019     Sensorineural hearing loss (SNHL) of left ear with unrestricted hearing of right ear 9/21/2022     Spondylolisthesis at L5-S1  level 11/1/2017       MEDICATIONS:   Prior to Admission Medications   Prescriptions Last Dose Informant Patient Reported? Taking?   FLUoxetine (PROZAC) 10 MG capsule 5/6/2023 at pm  Yes Yes   Sig: Take 10 mg by mouth daily With 20 mg capsule for total daily dose of 30 mg   FLUoxetine (PROZAC) 20 MG capsule 5/6/2023 at pm  Yes Yes   Sig: Take 1 capsule by mouth daily With 10 mg capsule for total daily dose of 30 mg   acetaminophen (TYLENOL) 325 MG tablet Unknown at prn  Yes Yes   Sig: Take 325-650 mg by mouth every 6 hours as needed for mild pain   albuterol (PROAIR HFA/PROVENTIL HFA/VENTOLIN HFA) 108 (90 Base) MCG/ACT inhaler Unknown at prn  Yes Yes   Sig: Inhale 2 puffs into the lungs every 4 hours as needed   cetirizine (ZYRTEC) 10 MG tablet 5/7/2023 at am  Yes Yes   Sig: Take 10 mg by mouth daily   diclofenac (VOLTAREN) 1 % topical gel Unknown at prn  Yes Yes   Sig: Apply 2-4 g topically 3 times daily as needed   diphenhydramine-zinc acetate (BENADRYL ITCH RELIEF STICK) 2-0.1 % external stick Unknown  Yes Yes   Sig: Apply 1 g topically as needed   fluticasone (FLONASE) 50 MCG/ACT nasal spray Past Week  Yes Yes   Sig: Spray 1 spray into both nostrils daily   fluticasone-vilanterol (BREO ELLIPTA) 200-25 MCG/INH inhaler 5/6/2023 at pm  Yes Yes   Sig: Inhale 1 puff into the lungs daily   losartan (COZAAR) 100 MG tablet 5/7/2023 at am  Yes Yes   Sig: Take 100 mg by mouth daily   meloxicam (MOBIC) 15 MG tablet 5/7/2023 at am  Yes Yes   Sig: Take 1 tablet by mouth daily   montelukast (SINGULAIR) 10 MG tablet 5/6/2023 at pm  Yes Yes   Sig: Take 10 mg by mouth At Bedtime   omeprazole (PRILOSEC) 20 MG DR capsule 5/7/2023 at am  Yes Yes   Sig: Take 20 mg by mouth daily   pramipexole (MIRAPEX) 0.125 MG tablet 5/6/2023 at pm  Yes Yes   Sig: Take 1 tablet by mouth At Bedtime   saline 0.65 % SOLN Unknown at prn  Yes Yes   Sig: Spray 1 spray in nostril every 4 hours as needed   triamterene-HCTZ (DYAZIDE) 37.5-25 MG capsule  5/7/2023 at am  Yes Yes   Sig: Take 1 capsule by mouth daily      Facility-Administered Medications: None       ALLERGIES:   Allergies   Allergen Reactions     Oxycodone Nausea and Vomiting     Violent vomiting.  Violent vomiting.       Seasonal Allergies      Other reaction(s): EENT - sneezing  Hay fever     Beta Adrenergic Blockers Other (See Comments)     Contraindicated with immunotherapy     Lisinopril      Other reaction(s): Cough       SOCIAL HISTORY:  Social History     Tobacco Use     Smoking status: Never     Smokeless tobacco: Never   Substance Use Topics     Alcohol use: Yes     Drug use: Never       FAMILY HISTORY:  No family history on file.    PHYSICAL EXAM:   General  Alert, oriented and uncomfortable  Vital Signs with Ranges  Temp: 99.1  F (37.3  C) Temp src: Oral BP: 132/83 Pulse: 64   Resp: 16 SpO2: 99 % O2 Device: None (Room air)    No intake/output data recorded.    Constitutional: healthy, alert and no distress   Cardiovascular: negative, PMI normal. No lifts, heaves, or thrills. RRR. No murmurs, clicks gallops or rub  Respiratory: negative, Percussion normal. Good diaphragmatic excursion. Lungs clear  Abdomen: Abdomen soft, non-tender. BS normal. No masses, organomegaly          ADDITIONAL COMMENTS:   I reviewed the patient's new clinical lab test results.   Recent Labs   Lab Test 05/08/23  0452 05/07/23  1500   WBC 4.6 6.8   HGB 10.1* 11.5*   MCV 91 90    282     Recent Labs   Lab Test 05/08/23  0452 05/07/23  1500   POTASSIUM 4.0 3.9   CHLORIDE 98 88*   CO2 26 28   BUN 9.5 17.0   ANIONGAP 8 10     Recent Labs   Lab Test 05/07/23  1500 05/07/23  1459   ALBUMIN 4.1  --    BILITOTAL 0.3  --    ALT 22  --    AST 18  --    PROTEIN  --  Negative       I reviewed the patient's new imaging results.        CONSULTATION ASSESSMENT AND PLAN:    Jessica Ricketts is a 72 year old female with c/o nausea and vomiting and abdominal pain. Had noted bradycardia with dizziness overnight and moved to CCU for  further evaluation.    Abdominal pain, epigastric  Nausea and vomiting  Takes daily meloxicam   Has h/o GERD with no prior EGD  Abdominal pain associated with eating and unable to tolerate oral intake  Patient likely has PUD and possible gastroenteritis and some of abdominal pain is muscular related to retching  Will need cardiology evaluation and clearance for EGD. EGD tentatively scheduled this afternoon.    -- keep NPO  -- IV fluids and prn antiemetics  -- IV pantoprazole 40 mg  -- Hold NSAIDs  -- await cardiology evaluation and clearance for EGD.        [facility].  Office: [phone]  Cell : [phone] (Dr. Medina)  Cell: [phone] (Melonie Carter PA-C)

## 2023-05-09 ENCOUNTER — APPOINTMENT (OUTPATIENT)
Dept: CARDIOLOGY | Facility: CLINIC | Age: 73
End: 2023-05-09
Attending: HOSPITALIST
Payer: COMMERCIAL

## 2023-05-09 LAB
ANION GAP SERPL CALCULATED.3IONS-SCNC: 7 MMOL/L (ref 7–15)
ATRIAL RATE - MUSE: 51 BPM
ATRIAL RATE - MUSE: 55 BPM
BUN SERPL-MCNC: 6.3 MG/DL (ref 8–23)
CALCIUM SERPL-MCNC: 8.6 MG/DL (ref 8.8–10.2)
CHLORIDE SERPL-SCNC: 90 MMOL/L (ref 98–107)
CREAT SERPL-MCNC: 0.79 MG/DL (ref 0.51–0.95)
CREAT UR-MCNC: 30.4 MG/DL
DEPRECATED HCO3 PLAS-SCNC: 27 MMOL/L (ref 22–29)
DIASTOLIC BLOOD PRESSURE - MUSE: NORMAL MMHG
DIASTOLIC BLOOD PRESSURE - MUSE: NORMAL MMHG
ERYTHROCYTE [DISTWIDTH] IN BLOOD BY AUTOMATED COUNT: 13.9 % (ref 10–15)
FRACT EXCRET NA UR+SERPL-RTO: 1 %
GFR SERPL CREATININE-BSD FRML MDRD: 80 ML/MIN/1.73M2
GLUCOSE SERPL-MCNC: 87 MG/DL (ref 70–99)
HCT VFR BLD AUTO: 31.5 % (ref 35–47)
HGB BLD-MCNC: 10.2 G/DL (ref 11.7–15.7)
INTERPRETATION ECG - MUSE: NORMAL
INTERPRETATION ECG - MUSE: NORMAL
LVEF ECHO: NORMAL
MAGNESIUM SERPL-MCNC: 1.9 MG/DL (ref 1.7–2.3)
MCH RBC QN AUTO: 29.8 PG (ref 26.5–33)
MCHC RBC AUTO-ENTMCNC: 32.4 G/DL (ref 31.5–36.5)
MCV RBC AUTO: 92 FL (ref 78–100)
OSMOLALITY UR: 164 MMOL/KG (ref 100–1200)
P AXIS - MUSE: -58 DEGREES
P AXIS - MUSE: NORMAL DEGREES
PATH REPORT.COMMENTS IMP SPEC: NORMAL
PATH REPORT.COMMENTS IMP SPEC: NORMAL
PATH REPORT.FINAL DX SPEC: NORMAL
PATH REPORT.GROSS SPEC: NORMAL
PATH REPORT.MICROSCOPIC SPEC OTHER STN: NORMAL
PATH REPORT.RELEVANT HX SPEC: NORMAL
PHOTO IMAGE: NORMAL
PLATELET # BLD AUTO: 254 10E3/UL (ref 150–450)
POTASSIUM SERPL-SCNC: 3.8 MMOL/L (ref 3.4–5.3)
PR INTERVAL - MUSE: 232 MS
PR INTERVAL - MUSE: 240 MS
QRS DURATION - MUSE: 88 MS
QRS DURATION - MUSE: 94 MS
QT - MUSE: 420 MS
QT - MUSE: 424 MS
QTC - MUSE: 387 MS
QTC - MUSE: 405 MS
R AXIS - MUSE: 10 DEGREES
R AXIS - MUSE: 11 DEGREES
RBC # BLD AUTO: 3.42 10E6/UL (ref 3.8–5.2)
SODIUM SERPL-SCNC: 124 MMOL/L (ref 136–145)
SODIUM SERPL-SCNC: 131 MMOL/L (ref 136–145)
SODIUM UR-SCNC: 48 MMOL/L
SYSTOLIC BLOOD PRESSURE - MUSE: NORMAL MMHG
SYSTOLIC BLOOD PRESSURE - MUSE: NORMAL MMHG
T AXIS - MUSE: 43 DEGREES
T AXIS - MUSE: 44 DEGREES
VENTRICULAR RATE- MUSE: 51 BPM
VENTRICULAR RATE- MUSE: 55 BPM
WBC # BLD AUTO: 3.9 10E3/UL (ref 4–11)

## 2023-05-09 PROCEDURE — 36415 COLL VENOUS BLD VENIPUNCTURE: CPT | Performed by: INTERNAL MEDICINE

## 2023-05-09 PROCEDURE — 250N000011 HC RX IP 250 OP 636: Performed by: HOSPITALIST

## 2023-05-09 PROCEDURE — 96376 TX/PRO/DX INJ SAME DRUG ADON: CPT

## 2023-05-09 PROCEDURE — G0378 HOSPITAL OBSERVATION PER HR: HCPCS

## 2023-05-09 PROCEDURE — 99232 SBSQ HOSP IP/OBS MODERATE 35: CPT | Performed by: INTERNAL MEDICINE

## 2023-05-09 PROCEDURE — 99222 1ST HOSP IP/OBS MODERATE 55: CPT | Performed by: INTERNAL MEDICINE

## 2023-05-09 PROCEDURE — 84300 ASSAY OF URINE SODIUM: CPT | Performed by: INTERNAL MEDICINE

## 2023-05-09 PROCEDURE — C9113 INJ PANTOPRAZOLE SODIUM, VIA: HCPCS | Performed by: HOSPITALIST

## 2023-05-09 PROCEDURE — 85027 COMPLETE CBC AUTOMATED: CPT | Performed by: INTERNAL MEDICINE

## 2023-05-09 PROCEDURE — 250N000013 HC RX MED GY IP 250 OP 250 PS 637: Performed by: HOSPITALIST

## 2023-05-09 PROCEDURE — 255N000002 HC RX 255 OP 636: Performed by: HOSPITALIST

## 2023-05-09 PROCEDURE — 250N000011 HC RX IP 250 OP 636: Performed by: PHYSICIAN ASSISTANT

## 2023-05-09 PROCEDURE — 80048 BASIC METABOLIC PNL TOTAL CA: CPT | Performed by: INTERNAL MEDICINE

## 2023-05-09 PROCEDURE — 258N000003 HC RX IP 258 OP 636: Performed by: INTERNAL MEDICINE

## 2023-05-09 PROCEDURE — 999N000208 ECHOCARDIOGRAM COMPLETE

## 2023-05-09 PROCEDURE — 83935 ASSAY OF URINE OSMOLALITY: CPT | Performed by: INTERNAL MEDICINE

## 2023-05-09 PROCEDURE — 83735 ASSAY OF MAGNESIUM: CPT | Performed by: INTERNAL MEDICINE

## 2023-05-09 PROCEDURE — 250N000013 HC RX MED GY IP 250 OP 250 PS 637: Performed by: INTERNAL MEDICINE

## 2023-05-09 PROCEDURE — 84295 ASSAY OF SERUM SODIUM: CPT | Performed by: INTERNAL MEDICINE

## 2023-05-09 PROCEDURE — 93306 TTE W/DOPPLER COMPLETE: CPT | Mod: 26 | Performed by: INTERNAL MEDICINE

## 2023-05-09 RX ORDER — SODIUM CHLORIDE 9 MG/ML
INJECTION, SOLUTION INTRAVENOUS CONTINUOUS
Status: DISCONTINUED | OUTPATIENT
Start: 2023-05-09 | End: 2023-05-09

## 2023-05-09 RX ORDER — AMOXICILLIN 250 MG
1 CAPSULE ORAL 2 TIMES DAILY
Status: DISCONTINUED | OUTPATIENT
Start: 2023-05-09 | End: 2023-05-10 | Stop reason: HOSPADM

## 2023-05-09 RX ORDER — POLYETHYLENE GLYCOL 3350 17 G/17G
17 POWDER, FOR SOLUTION ORAL 2 TIMES DAILY PRN
Status: DISCONTINUED | OUTPATIENT
Start: 2023-05-09 | End: 2023-05-10 | Stop reason: HOSPADM

## 2023-05-09 RX ORDER — PANTOPRAZOLE SODIUM 40 MG/1
40 TABLET, DELAYED RELEASE ORAL
Status: DISCONTINUED | OUTPATIENT
Start: 2023-05-10 | End: 2023-05-10 | Stop reason: HOSPADM

## 2023-05-09 RX ORDER — ONDANSETRON 4 MG/1
4 TABLET, ORALLY DISINTEGRATING ORAL EVERY 6 HOURS
Status: DISCONTINUED | OUTPATIENT
Start: 2023-05-09 | End: 2023-05-10 | Stop reason: HOSPADM

## 2023-05-09 RX ORDER — SODIUM CHLORIDE 9 MG/ML
INJECTION, SOLUTION INTRAVENOUS CONTINUOUS
Status: ACTIVE | OUTPATIENT
Start: 2023-05-09 | End: 2023-05-09

## 2023-05-09 RX ORDER — MAGNESIUM OXIDE 400 MG/1
400 TABLET ORAL EVERY 4 HOURS
Status: COMPLETED | OUTPATIENT
Start: 2023-05-09 | End: 2023-05-09

## 2023-05-09 RX ADMIN — ACETAMINOPHEN 650 MG: 325 TABLET ORAL at 08:11

## 2023-05-09 RX ADMIN — PRAMIPEXOLE DIHYDROCHLORIDE 0.12 MG: 0.12 TABLET ORAL at 21:25

## 2023-05-09 RX ADMIN — Medication 400 MG: at 11:14

## 2023-05-09 RX ADMIN — SODIUM CHLORIDE: 9 INJECTION, SOLUTION INTRAVENOUS at 12:50

## 2023-05-09 RX ADMIN — CETIRIZINE HYDROCHLORIDE 10 MG: 10 TABLET, FILM COATED ORAL at 08:11

## 2023-05-09 RX ADMIN — FLUOXETINE 20 MG: 20 CAPSULE ORAL at 21:25

## 2023-05-09 RX ADMIN — ONDANSETRON 4 MG: 4 TABLET, ORALLY DISINTEGRATING ORAL at 16:22

## 2023-05-09 RX ADMIN — ACETAMINOPHEN 650 MG: 325 TABLET ORAL at 20:29

## 2023-05-09 RX ADMIN — PANTOPRAZOLE SODIUM 40 MG: 40 INJECTION, POWDER, FOR SOLUTION INTRAVENOUS at 08:11

## 2023-05-09 RX ADMIN — ACETAMINOPHEN 650 MG: 325 TABLET ORAL at 12:19

## 2023-05-09 RX ADMIN — SENNOSIDES AND DOCUSATE SODIUM 1 TABLET: 50; 8.6 TABLET ORAL at 14:42

## 2023-05-09 RX ADMIN — LOSARTAN POTASSIUM 50 MG: 50 TABLET, FILM COATED ORAL at 08:11

## 2023-05-09 RX ADMIN — ONDANSETRON 4 MG: 4 TABLET, ORALLY DISINTEGRATING ORAL at 22:30

## 2023-05-09 RX ADMIN — ACETAMINOPHEN 650 MG: 325 TABLET ORAL at 16:22

## 2023-05-09 RX ADMIN — HUMAN ALBUMIN MICROSPHERES AND PERFLUTREN 9 ML: 10; .22 INJECTION, SOLUTION INTRAVENOUS at 08:22

## 2023-05-09 RX ADMIN — Medication 400 MG: at 08:11

## 2023-05-09 RX ADMIN — ONDANSETRON 4 MG: 4 TABLET, ORALLY DISINTEGRATING ORAL at 11:14

## 2023-05-09 RX ADMIN — FLUTICASONE FUROATE AND VILANTEROL 1 PUFF: 200; 25 POWDER RESPIRATORY (INHALATION) at 20:29

## 2023-05-09 RX ADMIN — SENNOSIDES AND DOCUSATE SODIUM 1 TABLET: 50; 8.6 TABLET ORAL at 20:29

## 2023-05-09 ASSESSMENT — ACTIVITIES OF DAILY LIVING (ADL)
ADLS_ACUITY_SCORE: 21

## 2023-05-09 NOTE — CONSULTS
[facility]    Nephrology Consultation     [facility]    Date of Admission:  5/7/2023    Assessment & Plan     <HYPONATREMIA    ~Jessica has resolving hyponatremia. I think the 124 this morning was likely a one-off or lab error as the sodium resolved even before the IV fluids were resumed.     ~I agree the presenting hyponatremia was hypoosmotic hypovolemic hyponatremia with several aggravating factors. She continued to consume a fair amount of water while on her thiazide diuretic and her pain and nausea/vomiting compounded the high  state to result in hyponatremia. The uosm today are more consistent with a normal response to hyponatremia (164 is probably close to maximal dilution in a 72 year old) with the thiazide getting out of her system.     ~I think Jessica should remain off thiazide diuretics and would choose a non-diuretic for additional antihypertensives. A loop diuretic could be used in the future, however.     -Discontinue IV fluids. I told Jessica not to push fluids but keep at about two of the small plastic pitchers in her room per day.     -Would be fine with her going if her sodium is the same or better tomorrow with outpatient serum sodium within a week.     Thank you for consulting [facility]. Will follow with you.       Anatoliy Reardon MD  [facility], Nephrology  Cell:[phone]  Pager:[phone]    Securely message with the Vocera Web Console (learn more here)  Text page via [email or web address] Paging/Directory         /\/\/\/\/\/\/\/\/\/\/\/\/\/\/\/\/\/\/\/\/\/\/\/\/\/\    Reason for Consult     I was asked to see the patient for hyponatremia.    Primary Care Physician     Eugene Rush    Chief Complaint     Vomiting        History of Present Illness     Jessica Ricketts is a 72 year old female who presented with a recent history of abdominal pain and vomitiung. Jessica has been on meloxicam for pain. After admission was endscoped which showed esophagitis  consistent with reflux.     On presentation the sodium was 126 and was felt related to hypovolemia. With IVF the sodium improved overnight, but the next day dropped to 124. IVF written to resume and follow up (before resumption) was up to 131. Uosm 124 was at the point serum sodium was 131. Looking back at admission urinalysis the spec grav was low at 1.005.     Currently, Jessica continues to have some sense of dizziness which seems to refer to a sens of dysequilibrium. She feels the need to hold on to her daughter while walking for instance. Her thinking seems clear.     She saw her NP on the 27th at which point her blood pressure was running high based on office and home measurements. At that time her losartan was increased to 100mg.     She has been on the hydrochlorothiazide/triamterene combo for some years.     Interestingly, during her recent GI illness, she was vomiting up foods, but was able to hold down fluids and was drinking a usual amount for her, which is consuming one of her water bottles full three times daily (the insulated bottles seems to be about 750-1000 mL by the look of it)        History is obtained from the patient and chart review.      Past Medical History   I have reviewed this patient's medical history and updated it with pertinent information if needed.   Past Medical History:   Diagnosis Date     Allergic asthma, mild intermittent, with acute exacerbation 3/3/2012     Allergic rhinitis 10/7/2019    Formatting of this note might be different from the original. 10/7/2019 - Dr. Reddy A: Perennial and seasonal rhinitis, poorly controlled with Flonase, Allegra and Singulair. Allergy skin tests show strongest reactivity to cat dander, guinea pigs, ragweed; moderate reactivity to dog dander, dust mites, molds and tree pollens.   P -Reviewed with patient perennial and seasonal allergen avoidance and     Benign neoplasm of colon 12/30/2003     Bilateral carpal tunnel syndrome 5/7/2023      Candidiasis 10/19/2018     Essential hypertension, benign 10/31/2002     Gastroesophageal reflux disease with esophagitis 9/13/2001     History of total knee replacement 11/17/2014    Last Assessment & Plan:  Formatting of this note might be different from the original. 69yoF with h/o right TKA in Nov 2014 with new fall onto right knee 2-3 weeks ago with increased pain. Imaging negative for fracture or hardware failure, and pain is improving.   -Reassured TKA is in place without loosening or fractures noted  -Continue activities as tolerated  -Continue exercises for ROM, streng     Hypersomnia with sleep apnea 11/1/2007     Mild single current episode of major depressive disorder (H) 12/16/2017     Moderate persistent asthma without complication 6/12/2019    Formatting of this note might be different from the original. 10/7/2019 - Dr. Reddy  A: recurrent cough and wheeze not well controlled with Flovent 110. Allergy skin tests shows reactivity to multiple perennial and seasonal allergens that can exacerbate asthma control. Monitor for improvement with allergen desensitization.   P -Reviewed with patient the signs of asthma control include no cough or      Myopia 5/1/2008     BETO (obstructive sleep apnea) 5/7/2023     Osteopenia determined by x-ray 6/8/2018     Presbyopia 5/1/2008     Recurrent major depressive disorder, in partial remission (H) 11/6/2020     Restless leg syndrome 2/10/2019     Sensorineural hearing loss (SNHL) of left ear with unrestricted hearing of right ear 9/21/2022     Spondylolisthesis at L5-S1 level 11/1/2017       Past Surgical History   I have reviewed this patient's surgical history and updated it with pertinent information if needed.  Past Surgical History:   Procedure Laterality Date     ESOPHAGOSCOPY, GASTROSCOPY, DUODENOSCOPY (EGD), COMBINED N/A 5/8/2023    Procedure: Esophagoscopy, gastroscopy, duodenoscopy (EGD), combined;  Surgeon: Ethan Medina MD;  Location: [facility]      TOTAL KNEE ARTHROPLASTY         Prior to Admission Medications   Prior to Admission Medications   Prescriptions Last Dose Informant Patient Reported? Taking?   FLUoxetine (PROZAC) 10 MG capsule 5/6/2023 at pm  Yes Yes   Sig: Take 10 mg by mouth daily With 20 mg capsule for total daily dose of 30 mg   FLUoxetine (PROZAC) 20 MG capsule 5/6/2023 at pm  Yes Yes   Sig: Take 1 capsule by mouth daily With 10 mg capsule for total daily dose of 30 mg   acetaminophen (TYLENOL) 325 MG tablet Unknown at prn  Yes Yes   Sig: Take 325-650 mg by mouth every 6 hours as needed for mild pain   albuterol (PROAIR HFA/PROVENTIL HFA/VENTOLIN HFA) 108 (90 Base) MCG/ACT inhaler Unknown at prn  Yes Yes   Sig: Inhale 2 puffs into the lungs every 4 hours as needed   cetirizine (ZYRTEC) 10 MG tablet 5/7/2023 at am  Yes Yes   Sig: Take 10 mg by mouth daily   diclofenac (VOLTAREN) 1 % topical gel Unknown at prn  Yes Yes   Sig: Apply 2-4 g topically 3 times daily as needed   diphenhydramine-zinc acetate (BENADRYL ITCH RELIEF STICK) 2-0.1 % external stick Unknown  Yes Yes   Sig: Apply 1 g topically as needed   fluticasone (FLONASE) 50 MCG/ACT nasal spray Past Week  Yes Yes   Sig: Spray 1 spray into both nostrils daily   fluticasone-vilanterol (BREO ELLIPTA) 200-25 MCG/INH inhaler 5/6/2023 at pm  Yes Yes   Sig: Inhale 1 puff into the lungs daily   losartan (COZAAR) 100 MG tablet 5/7/2023 at am  Yes Yes   Sig: Take 100 mg by mouth daily   meloxicam (MOBIC) 15 MG tablet 5/7/2023 at am  Yes Yes   Sig: Take 1 tablet by mouth daily   montelukast (SINGULAIR) 10 MG tablet 5/6/2023 at pm  Yes Yes   Sig: Take 10 mg by mouth At Bedtime   omeprazole (PRILOSEC) 20 MG DR capsule 5/7/2023 at am  Yes Yes   Sig: Take 20 mg by mouth daily   pramipexole (MIRAPEX) 0.125 MG tablet 5/6/2023 at pm  Yes Yes   Sig: Take 1 tablet by mouth At Bedtime   saline 0.65 % SOLN Unknown at prn  Yes Yes   Sig: Spray 1 spray in nostril every 4 hours as needed   triamterene-HCTZ  (DYAZIDE) 37.5-25 MG capsule 5/7/2023 at am  Yes Yes   Sig: Take 1 capsule by mouth daily      Facility-Administered Medications: None     [unfilled]  Allergies   Allergies   Allergen Reactions     Oxycodone Nausea and Vomiting     Violent vomiting.  Violent vomiting.       Seasonal Allergies      Other reaction(s): EENT - sneezing  Hay fever     Beta Adrenergic Blockers Other (See Comments)     Contraindicated with immunotherapy     Lisinopril      Other reaction(s): Cough       Social History   I have reviewed this patient's social history and updated it with pertinent information if needed. Jessica Ricketts  reports that she has never smoked. She has never used smokeless tobacco. She reports current alcohol use. She reports that she does not use drugs. The patient's usual occupation:    Family History   I have reviewed this patient's family history and updated it with pertinent information if needed. No family history of kidney disease.   History reviewed. No pertinent family history.    Review of Systems   The 14 point Review of Systems is negative other than noted in the HPI.     Physical Exam   Temp: 97.9  F (36.6  C) Temp src: Oral BP: (!) 142/82 Pulse: 60   Resp: 16 SpO2: 100 % O2 Device: None (Room air)    Vital Signs with Ranges  Temp:  [97.7  F (36.5  C)-98.7  F (37.1  C)] 97.9  F (36.6  C)  Pulse:  [51-78] 60  Resp:  [0-18] 16  BP: ()/() 142/82  SpO2:  [90 %-100 %] 100 %  161 lbs 3.2 oz    GENERAL APPEARANCE:  Alert, in no distress, pleasant, cooperative, oriented x self, place and recent events.   EYES:  EOM, lids, pupils and irises normal, sclera clear and conjunctiva normal  ENT:  Mouth normal, moist mucous membranes, nose normal without drainage or crusting,   hearing acuity grossly intact  NECK: Supple, symmetrical, trachea midline, No JVD  RESP:  Respiratory effort normal,no respiratory distress, Lung sounds clear   CV:  Auscultation of heart done, rate and rhythm  controlled and regular, no murmur, no rub or gallop.   ABDOMEN:  Soft, nontender, no palpable masses or organomegaly.  EXT: Edema none bilateral lower extremities.  No gross deformities.  SKIN:  skin on extremities warm, dry and intact without rashes  NEURO: cranial nerves grossly intact, no facial asymmetry, no speech deficits and able to follow directions, moves all extremities symmetrically  PSYCH:  insight and judgement and memory appear intact, affect and mood normal    Data   BMP  Recent Labs   Lab 05/09/23  1229 05/09/23  0548 05/08/23  0452 05/08/23  0348 05/07/23  1500   * 124* 132*  --  126*   POTASSIUM  --  3.8 4.0  --  3.9   CHLORIDE  --  90* 98  --  88*   JORGE  --  8.6* 8.6*  --  9.1   CO2  --  27 26  --  28   BUN  --  6.3* 9.5  --  17.0   CR  --  0.79 0.75  --  0.80   GLC  --  87 92 85 90     [email or web address](phos:4)  CBC)  Recent Labs   Lab 05/09/23  0548 05/08/23  0452 05/07/23  1500   WBC 3.9* 4.6 6.8   HGB 10.2* 10.1* 11.5*   HCT 31.5* 30.4* 34.0*   MCV 92 91 90    244 282     Recent Labs   Lab 05/07/23  1500   AST 18   ALT 22   ALKPHOS 52   BILITOTAL 0.3     No lab results found in last 7 days.  No results found for: D2VIT, D3VIT, DTOT  Recent Labs   Lab 05/09/23  0548   HGB 10.2*   HCT 31.5*   MCV 92     No results for input(s): PTHI in the last 168 hours.     60 minutes devoted to this consultation including interview, counseling and examination of the patient as well as , review of internal and external records along with discussion of the case with hospitalist team, RN and literature review/formulation of recommendations.

## 2023-05-09 NOTE — PROGRESS NOTES
[facility]  Gastroenterology Progress Note     Jessica Ricketts MRN# [id number]   YOB: 1950 Age: 72 year old          Assessment and Plan:   Jessica Ricketts is a 72 year old female with c/o nausea and vomiting and abdominal pain. Had noted bradycardia with dizziness overnight and moved to CCU for further evaluation.     Abdominal pain, epigastric  Nausea and vomiting  Takes daily meloxicam   Has h/o GERD with no prior EGD  Abdominal pain associated with eating and unable to tolerate oral intake  Patient likely has PUD and possible gastroenteritis and some of abdominal pain is muscular related to retching  5/8 EGD noted grade B esophagitis but otherwise unremarkable. Biopsies taken  Source og symptoms likely gastroenteritis     -- advance diet as tolerated  -- scheduled zofran 4 mg q 6 hours  --  pantoprazole 40 mg twice daily  -- if tolerated lunch without vomiting- ok with GI to discharge                  Interval History:   denies chest pain, denies shortness of breath, alert, oriented to person, place and time and no vomiting. Still has nausea              Review of Systems:   C: NEGATIVE for fever, chills, change in weight  E/M: NEGATIVE for ear, mouth and throat problems  R: NEGATIVE for significant cough or SOB  CV: NEGATIVE for chest pain, palpitations or peripheral edema             Medications:   I have reviewed this patient's current medications    cetirizine  10 mg Oral Daily     FLUoxetine  20 mg Oral At Bedtime     fluticasone-vilanterol  1 puff Inhalation QPM     losartan  50 mg Oral Daily     ondansetron  4 mg Oral Q6H     pantoprazole  40 mg Intravenous BID     pramipexole  0.125 mg Oral At Bedtime     sodium chloride (PF)  3 mL Intracatheter Q8H                  Physical Exam:   Vitals were reviewed  Vital Signs with Ranges  Temp:  [97.2  F (36.2  C)-98.7  F (37.1  C)] 97.9  F (36.6  C)  Pulse:  [51-78] 60  Resp:  [0-18] 16  BP: ()/() 142/82  SpO2:   [90 %-100 %] 100 %  No intake/output data recorded.  Constitutional: healthy, alert and no distress   Cardiovascular: negative, PMI normal. No lifts, heaves, or thrills. RRR. No murmurs, clicks gallops or rub  Respiratory: negative, Percussion normal. Good diaphragmatic excursion. Lungs clear  Abdomen: Abdomen soft, mildly tender. BS normal. No masses, organomegaly           Data:   I reviewed the patient's new clinical lab test results.   Recent Labs   Lab Test 05/09/23  0548 05/08/23  0452 05/07/23  1500   WBC 3.9* 4.6 6.8   HGB 10.2* 10.1* 11.5*   MCV 92 91 90    244 282     Recent Labs   Lab Test 05/09/23  0548 05/08/23  0452 05/07/23  1500   POTASSIUM 3.8 4.0 3.9   CHLORIDE 90* 98 88*   CO2 27 26 28   BUN 6.3* 9.5 17.0   ANIONGAP 7 8 10     Recent Labs   Lab Test 05/07/23  1500 05/07/23  1459   ALBUMIN 4.1  --    BILITOTAL 0.3  --    ALT 22  --    AST 18  --    PROTEIN  --  Negative       I reviewed the patient's new imaging results.    All laboratory data reviewed  All imaging studies reviewed by me.    Melonie Carter PA-C,  5/9/2023  [facility]  Office : [phone]  Cell: [phone] (Dr. Medina)  Cell: [phone] (Melonie Carter PA-C)

## 2023-05-09 NOTE — PROGRESS NOTES
Assessment and Plan:   Jessica Ricketts is a 72 year old female who was admitted on 5/7/2023.     Asymptomatic bradycardia with ectopic atrial rhythm  Hypertension     Recommendations  1. Telemetry reviewed.  No clinically significant arrhythmias or high-grade AV block  2. Patient is asymptomatic from a cardiac standpoint.  No prior cardiac history.  Her heart rate is quite stable in the 55-60 range.  She has a ectopic atrial rhythm and some PACs.  3. Her dizziness does not appear to be cardiac in origin and it is more exacerbated by orthostasis.  4. Echocardiogram is normal.    5. She is okay to discharge from a cardiology standpoint.  6. At discharge I would anticipate that she would have a 30-day event monitor and I may have into a stress ECG study on her as an outpatient depending on what we find.  7. Recommend outpatient follow-up in the cardiology clinic in 3 weeks with results of event monitor with an ROSALIO.  8. Blood pressure is elevated.  Recommend addressing this as an outpatient.  Right now it may be a because of acute comorbidities and illnesses.    We will sign off.  Please not hesitate to contact us with questions.        Interval History:     No significant overnight issues.  No further cardiac symptoms.  She had an EGD done.          Medications:       cetirizine  10 mg Oral Daily     FLUoxetine  20 mg Oral At Bedtime     fluticasone-vilanterol  1 puff Inhalation QPM     losartan  50 mg Oral Daily     magnesium oxide  400 mg Oral Q4H     ondansetron  4 mg Oral Q6H     pantoprazole  40 mg Intravenous BID     pramipexole  0.125 mg Oral At Bedtime     sodium chloride (PF)  3 mL Intracatheter Q8H            Physical Exam:     Patient Vitals for the past 24 hrs:   BP Temp Temp src Pulse Resp SpO2 Weight   05/09/23 0800 (!) 142/82 97.9  F (36.6  C) Oral 60 16 100 % --   05/09/23 0600 (!) 156/105 98.7  F (37.1  C) Oral 66 -- 100 % 73.1 kg (161 lb 3.2 oz)   05/09/23 0053 (!) 145/83 -- -- -- -- -- --    05/09/23 0050 99/76 98.4  F (36.9  C) Oral 61 16 -- --   05/08/23 1949 108/70 98.1  F (36.7  C) Oral 51 18 99 % --   05/08/23 1715 129/87 -- -- 78 -- -- --   05/08/23 1700 (!) 143/86 -- -- 62 -- -- --   05/08/23 1645 126/69 -- -- 64 -- -- --   05/08/23 1630 138/79 -- -- 51 -- -- --   05/08/23 1615 136/85 -- -- 55 -- -- --   05/08/23 1606 135/88 -- -- 56 -- -- --   05/08/23 1600 (!) 141/94 -- -- 62 -- -- --   05/08/23 1544 138/79 97.7  F (36.5  C) Oral 59 16 100 % --   05/08/23 1520 115/76 -- -- 66 (!) 8 96 % --   05/08/23 1506 128/69 -- -- 59 (!) 0 90 % --   05/08/23 1501 (!) 154/94 -- -- 69 (!) 9 99 % --   05/08/23 1445 (!) 150/118 -- -- 55 -- 100 % --   05/08/23 1300 126/88 97.2  F (36.2  C) Oral 52 16 100 % --     Vitals:    05/07/23 1453 05/07/23 2150 05/09/23 0600   Weight: 72.6 kg (160 lb) 73.5 kg (162 lb 0.6 oz) 73.1 kg (161 lb 3.2 oz)       No intake or output data in the 24 hours ending 05/09/23 1059    No intake/output data recorded.    Exam:  General alert oriented x3 no acute distress  Respirations clear to auscultation  Cardiovascular normal regular heart sounds  Abdomen nondistended   Psych appropriate affect         Data:   LABS (Last four results)  CMP  Recent Labs   Lab 05/09/23  0548 05/08/23  0452 05/08/23  0348 05/07/23  1500   * 132*  --  126*   POTASSIUM 3.8 4.0  --  3.9   CHLORIDE 90* 98  --  88*   CO2 27 26  --  28   ANIONGAP 7 8  --  10   GLC 87 92 85 90   BUN 6.3* 9.5  --  17.0   CR 0.79 0.75  --  0.80   GFRESTIMATED 80 85  --  79   JORGE 8.6* 8.6*  --  9.1   MAG 1.9 1.7  --   --    PROTTOTAL  --   --   --  6.5   ALBUMIN  --   --   --  4.1   BILITOTAL  --   --   --  0.3   ALKPHOS  --   --   --  52   AST  --   --   --  18   ALT  --   --   --  22     CBC  Recent Labs   Lab 05/09/23  0548 05/08/23  0452 05/07/23  1500   WBC 3.9* 4.6 6.8   RBC 3.42* 3.36* 3.80   HGB 10.2* 10.1* 11.5*   HCT 31.5* 30.4* 34.0*   MCV 92 91 90   MCH 29.8 30.1 30.3   MCHC 32.4 33.2 33.8   RDW 13.9 13.5 13.3     244 282     INRNo lab results found in last 7 days.  TROPONINS No results found for: TROPI, TROPONIN, TROPR, TROPN                                                                                                         LAWRENCE Mejias, [facility]  Cardiology, [facility]    This note was completed in part using dictation via the Dragon voice recognition software. Although reviewed after completion, some word and grammatical errors may occur and must be interpreted in the appropriate clinical context.  If there are any questions pertaining to this issue, please contact me for further clarification or information.

## 2023-05-09 NOTE — UTILIZATION REVIEW
Concurrent stay review; Secondary Review Determination     [facility]          Under the authority of the Utilization Management Committee, the utilization review process indicated a secondary review on the above patient.  The review outcome is based on review of the medical records, discussions with staff, and applying clinical experience noted on the date of the review.          (x) Observation Status Appropriate - Concurrent stay review    RATIONALE FOR DETERMINATION   72-year-old female with a history of GERD and daily use of meloxicam presents with epigastric pain and vomiting for 5 days, which is exacerbated with eating. CT scan showed no acute findings, and EGD revealed reflux esophagitis with no bleeding, small hiatal hernia, and congestive gastropathy. She was treated with IV fluids and pantoprazole in the ER. Hyponatremia is suspected to be due to hypovolemia from poor oral intake and vomiting, and nephrology consult was ordered. Additionally, asymptomatic bradycardia with ectopic atrial rhythm was noted, and cardiology review revealed no cardiac origin. She has a history of chronic dizziness, which has been exacerbated by rapid positional changes. She continues losartan with hold parameters, but triamterene hydrochlorothiazide was held due to hyponatremia vomiting and dehydration.   The severity of illness, intensity of service provided, expected LOS and risk for adverse outcome make the care appropriate for observation.      This document was produced using voice recognition software       The information on this document is developed by the utilization review team in order for the business office to ensure compliance.  This only denotes the appropriateness of proper admission status and does not reflect the quality of care rendered.         The definitions of Inpatient Status and Observation Status used in making the determination above are those provided in the CMS Coverage Manual,  Chapter 1 and Chapter 6, section 70.4.      Sincerely,     NAT MOURA MD    System Medical Director  Utilization Management  [facility].

## 2023-05-09 NOTE — PLAN OF CARE
"Goal Outcome Evaluation:    Pt here with Hyponatremia, dizziness, N&V.  A&OX4, pleasant and anxious this am.  VSS ex HTN and WDL of SBP < 180, on RA. Denies pain. Denies dizziness, N&V. LS CTA/diminished. Tele: SB / SA freq pauses <2 seconds.   Ambulating SBA w/IV pole.  GI: NPO diet. No BM overnight. Denies abdominal pain. : Voiding spontaneously overnight X2.   Labs: Hyponatremia at 124 this am.   Plan: SHIRA.    Discharge w/ Cardiac monitor.     Other:   Pt reports feeling \"fatigued\" for the last \"month\".                       "

## 2023-05-09 NOTE — PROGRESS NOTES
[facility]    Medicine Progress Note - Hospitalist Service    Date of Admission:  5/7/2023    Assessment & Plan   ej Ricketts is a 72 year old female with a history of GERD on omeprazole who uses meloxicam on a daily basis.  For the past 5 days she has had epigastric pain which is worsened with eating and has also been vomiting.  CT scan did not show any acute findings.  She has no fever and a normal white blood cell count.  She has a nonsurgical abdomen.  After receiving IV hydromorphone she has mild epigastric tenderness on exam.  In the emergency room she was treated with IV fluid and pantoprazole.  An additional finding was hyponatremia.     Epigastric pain  Vomiting-resolved  History of gastroesophageal reflux disease   Presents with n/vomiting/abdominal pain for about 5 days.  She uses meloxicam daily.given NSAIDs use, initial concern for peptic ulcer disease. CT abd/pelvis result reviewed, no acute finding to explain symptoms.  * EGD completed on 5/8, results reviewed and discussed with Dr. Medina-- shows LA grade B reflus esophagitis with no bleeding, small hiatal hernia and congestive gastropathy. Biopsied.   - diet advanced to regular diet today, tolerated full without n/v  - change IV PPI to PO daily  - appreciate GI assistance    Hyponatremia   Suspect acute/subacute.  She is on hydrochlorothiazide and SSRI and now has been vomiting. Last BMP in care-everywhere was normal sodium. Suspect hypovolemia from poor oral intake and vomiting and diuretic use.   - TSH is normal  - Na improved from 126-->132 with IVF on 5/8, IVF continued, but sodium down to 124 this morning. Was on full liquid diet since last night and just advanced to regular diet  - will repeat Na at noon  - check urine lytes   - held IVF this morning, but will start at 75cc/hr now  - Hold her diuretics  - nephrology consult  - f/u BMP in AM    Bradycardia, asymptomatic  Ectopic atrial rhythm  Noted to have  "bradycardia overnight of 5/7-5/8.  Cardiology reviewed EKG and braycardia with ectopic atrial rhythm. Per cardiology, this is asymtomatic and dizziness does not appear cardiac in origin  - TSH is normal range  - echocardiogram-LVEF 54%, no wall motion abnormalities  - cardiology followed appreciate assistance, signed off. Event monitor at discharge and cards follow up (already ordered by cards)    Dizziness  Noted overnight of 5/7-5/8. Per cardiology, less likely cardiac in origin. Dizziness is improved, however per discussed with patient, she reports that she has always had dizziness and exacerbated with rapid positional changes. She reports she currently has mild dizziness and mild headache. A week ago she has a bad headache that improved. No focal symptoms.  - monitor     Hypertension   - Continue losartan with hold parameters  - Hold triamterene hydrochlorothiazide due to hyponatremia vomiting/dehydration     Asthma   Asymptomatic    Continue prior to admission inhaled bronchodilators     Restless legs syndrome     Continue pramipexole     Depression     Continue fluoxetine       Diet: Regular Diet Adult    DVT Prophylaxis: Pneumatic Compression Devices  Alexander Catheter: Not present  Lines: None     Cardiac Monitoring: ACTIVE order. Indication: Bradycardias (48 hours)  Code Status: Full Code      Clinically Significant Risk Factors Present on Admission         # Hyponatremia: Lowest Na = 124 mmol/L in last 2 days, will monitor as appropriate          # Hypertension: home medication list includes antihypertensive(s)      # Overweight: Estimated body mass index is 28.56 kg/m  as calculated from the following:    Height as of this encounter: 1.6 m (5' 3\").    Weight as of this encounter: 73.1 kg (161 lb 3.2 oz).           Disposition Plan  likely in 24 hrs if sodium remains stable.     Expected Discharge Date: 05/10/2023                  Lita Martinez MD  Hospitalist Service  [facility] " Hospital  Securely message with [employer] (more info)  Text page via [employer] Paging/Directory   ______________________________________________________________________    Interval History   Overnight events noted-patient transferred to CCU for bradycardia. Refer to cross cover note. She felt dizzy and noted to be bradycardic. EKG at the time shows ismael cardia and some pause.  This morning, patient feels dizziness is better, she just feels hungry. No focal weakness.   States she has not ate much the last few days because vomiting and abdominal pain on the right side.  She had episode of left lower abdominal pain this morning which has since resolved.   She is currently NPO.     Physical Exam   Vital Signs: Temp: 97.9  F (36.6  C) Temp src: Oral BP: (!) 142/82 Pulse: 60   Resp: 16 SpO2: 100 % O2 Device: None (Room air)    Weight: 161 lbs 3.2 oz    General Appearance: Alert, awake and no apparent distress  Respiratory: CTAB, now wheezing  Cardiovascular: regular rate and rhythm  GI: soft, mild tenderness in right upper qud  Skin: warm and dry      Medical Decision Making       45 MINUTES SPENT BY ME on the date of service doing chart review, history, exam, documentation & further activities per the note.  MANAGEMENT DISCUSSED with the following over the past 24 hours: patient, daughter, RN, GI   NOTE(S)/MEDICAL RECORDS REVIEWED over the past 24 hours: cardiology note, GI note  Tests ORDERED & REVIEWED in the past 24 hours:  - BMP  - CBC  Tests personally interpreted in the past 24 hours:  - echo result and EGD report showing as above      Data     I have personally reviewed the following data over the past 24 hrs:    3.9 (L)  \   10.2 (L)   / 254     124 (L) 90 (L) 6.3 (L) /  87   3.8 27 0.79 \       Imaging results reviewed over the past 24 hrs:   Recent Results (from the past 24 hour(s))   Echocardiogram Complete   Result Value    LVEF  54%    Narrative    [id number]  [id number]  [id number]  [id number]^KATYA^MIRIAM^EZIO      [facility]  Echocardiography Laboratory  [address] [place] [address]     Name: JUAN ALONSO  MRN: [id number]  : 1950  Study Date: 2023 08:02 AM  Age: 72 yrs  Gender: Female  Patient Location: [facility]  Reason For Study: Bradycardia - Sinus  Ordering Physician: MIRIAM ALDANA  Referring Physician: Eugene Rush  Performed By: Naya Lee     BSA: 1.8 m2  Height: 63 in  Weight: 162 lb  HR: 61  BP: 142/82 mmHg  ______________________________________________________________________________  Procedure  Complete Portable Echo Adult. Optison (NDC #[id number]) given intravenously.  ______________________________________________________________________________  Interpretation Summary     The visual ejection fraction is estimated at 54%.  The right ventricle is normal in size and function.  The inferior vena cava was normal in size with preserved respiratory  variability.  There is no pericardial effusion.  ______________________________________________________________________________  Left Ventricle  The left ventricle is normal in structure, function and size. The visual  ejection fraction is estimated at 54%. No regional wall motion abnormalities  noted.     Right Ventricle  The right ventricle is normal in size and function.     Atria  Normal left atrial size. Right atrial size is normal.     Mitral Valve  The mitral valve is normal in structure and function. There is trace mitral  regurgitation.     Tricuspid Valve  The tricuspid valve is normal in structure and function. The right ventricular  systolic pressure is approximated at 29.0 mmHg plus the right atrial pressure.  There is trace to mild tricuspid regurgitation.     Aortic Valve  The aortic valve is trileaflet with aortic valve sclerosis. There is trace  aortic regurgitation. No hemodynamically significant valvular aortic stenosis.  The mean AoV pressure gradient is 8.0 mmHg. The peak AoV pressure gradient  is  14.0 mmHg.     Pulmonic Valve  The pulmonic valve is not well visualized.     Vessels  The aortic root is normal size. The inferior vena cava was normal in size with  preserved respiratory variability.     Pericardium  There is no pericardial effusion.     ______________________________________________________________________________  MMode/2D Measurements & Calculations  IVSd: 0.83 cm  LVIDd: 4.7 cm  LVIDs: 3.2 cm  LVPWd: 0.92 cm  LVPWs: 0.67 cm  FS: 33.6 %  LV mass(C)d: 140.3 grams  LV mass(C)dI: 79.3 grams/m2     Ao root diam: 3.3 cm  LA dimension: 3.4 cm  asc Aorta Diam: 3.7 cm  LA/Ao: 1.0  LVOT diam: 2.0 cm  LVOT area: 3.1 cm2  LA Volume (BP): 46.8 ml  LA Volume Index (BP): 26.4 ml/m2  RWT: 0.39  TAPSE: 2.7 cm     Doppler Measurements & Calculations  MV E max vikram: 83.1 cm/sec  MV A max vikram: 81.8 cm/sec  MV E/A: 1.0     MV dec time: 0.26 sec  Ao V2 max: 186.0 cm/sec  Ao max P.0 mmHg  Ao V2 mean: 127.5 cm/sec  Ao mean P.0 mmHg  Ao V2 VTI: 43.5 cm  ISAAK(I,D): 1.5 cm2  ISAAK(V,D): 1.7 cm2  AI P1/2t: 601.1 msec  LV V1 max P.5 mmHg  LV V1 max: 104.9 cm/sec  LV V1 VTI: 21.2 cm  SV(LVOT): 64.8 ml  SI(LVOT): 36.7 ml/m2  PA acc time: 0.14 sec  TR max vikram: 269.1 cm/sec  TR max P.0 mmHg  AV Vikram Ratio (DI): 0.56  ISAAK Index (cm2/m2): 0.84  E/E' av.3  Lateral E/e': 9.8  Medial E/e': 12.7  RV S Vikram: 14.6 cm/sec     ______________________________________________________________________________  Report approved by: Regina Mejias 2023 08:59 AM

## 2023-05-09 NOTE — CARE PLAN
Alert and oriented x4. VSS. Headache which tylenol helped x3 otherwise denies pain. Up independently. Voiding well. Zofran changed to scheduled. Tolerating diet but not super hungry. Plan to continue to monitor tonight. Likely home tomorrow.

## 2023-05-10 ENCOUNTER — APPOINTMENT (OUTPATIENT)
Dept: CARDIOLOGY | Facility: CLINIC | Age: 73
End: 2023-05-10
Attending: INTERNAL MEDICINE
Payer: COMMERCIAL

## 2023-05-10 ENCOUNTER — APPOINTMENT (OUTPATIENT)
Dept: CT IMAGING | Facility: CLINIC | Age: 73
End: 2023-05-10
Attending: INTERNAL MEDICINE
Payer: COMMERCIAL

## 2023-05-10 VITALS
SYSTOLIC BLOOD PRESSURE: 150 MMHG | HEIGHT: 63 IN | HEART RATE: 39 BPM | OXYGEN SATURATION: 97 % | DIASTOLIC BLOOD PRESSURE: 93 MMHG | RESPIRATION RATE: 16 BRPM | TEMPERATURE: 98 F | BODY MASS INDEX: 29.25 KG/M2 | WEIGHT: 165.1 LBS

## 2023-05-10 LAB
ANION GAP SERPL CALCULATED.3IONS-SCNC: 6 MMOL/L (ref 7–15)
BUN SERPL-MCNC: 8.3 MG/DL (ref 8–23)
CALCIUM SERPL-MCNC: 8.8 MG/DL (ref 8.8–10.2)
CHLORIDE SERPL-SCNC: 102 MMOL/L (ref 98–107)
CREAT SERPL-MCNC: 0.83 MG/DL (ref 0.51–0.95)
DEPRECATED HCO3 PLAS-SCNC: 27 MMOL/L (ref 22–29)
GFR SERPL CREATININE-BSD FRML MDRD: 75 ML/MIN/1.73M2
GLUCOSE SERPL-MCNC: 87 MG/DL (ref 70–99)
MAGNESIUM SERPL-MCNC: 1.8 MG/DL (ref 1.7–2.3)
POTASSIUM SERPL-SCNC: 4.2 MMOL/L (ref 3.4–5.3)
SODIUM SERPL-SCNC: 135 MMOL/L (ref 136–145)

## 2023-05-10 PROCEDURE — G0378 HOSPITAL OBSERVATION PER HR: HCPCS

## 2023-05-10 PROCEDURE — 36415 COLL VENOUS BLD VENIPUNCTURE: CPT | Performed by: INTERNAL MEDICINE

## 2023-05-10 PROCEDURE — 70450 CT HEAD/BRAIN W/O DYE: CPT

## 2023-05-10 PROCEDURE — 93272 ECG/REVIEW INTERPRET ONLY: CPT | Performed by: INTERNAL MEDICINE

## 2023-05-10 PROCEDURE — 250N000011 HC RX IP 250 OP 636: Performed by: PHYSICIAN ASSISTANT

## 2023-05-10 PROCEDURE — 99239 HOSP IP/OBS DSCHRG MGMT >30: CPT | Performed by: INTERNAL MEDICINE

## 2023-05-10 PROCEDURE — 83735 ASSAY OF MAGNESIUM: CPT | Performed by: INTERNAL MEDICINE

## 2023-05-10 PROCEDURE — 250N000013 HC RX MED GY IP 250 OP 250 PS 637: Performed by: INTERNAL MEDICINE

## 2023-05-10 PROCEDURE — 82310 ASSAY OF CALCIUM: CPT | Performed by: INTERNAL MEDICINE

## 2023-05-10 PROCEDURE — 250N000013 HC RX MED GY IP 250 OP 250 PS 637: Performed by: HOSPITALIST

## 2023-05-10 PROCEDURE — 99232 SBSQ HOSP IP/OBS MODERATE 35: CPT | Performed by: INTERNAL MEDICINE

## 2023-05-10 PROCEDURE — 93270 REMOTE 30 DAY ECG REV/REPORT: CPT

## 2023-05-10 RX ORDER — AMLODIPINE BESYLATE 5 MG/1
5 TABLET ORAL DAILY
Qty: 30 TABLET | Refills: 0 | Status: SHIPPED | OUTPATIENT
Start: 2023-05-10 | End: 2023-08-01

## 2023-05-10 RX ORDER — LOSARTAN POTASSIUM 100 MG/1
100 TABLET ORAL DAILY
Status: DISCONTINUED | OUTPATIENT
Start: 2023-05-10 | End: 2023-05-10 | Stop reason: HOSPADM

## 2023-05-10 RX ORDER — ONDANSETRON 4 MG/1
4 TABLET, ORALLY DISINTEGRATING ORAL EVERY 8 HOURS PRN
Qty: 10 TABLET | Refills: 0 | Status: SHIPPED | OUTPATIENT
Start: 2023-05-10

## 2023-05-10 RX ADMIN — SENNOSIDES AND DOCUSATE SODIUM 1 TABLET: 50; 8.6 TABLET ORAL at 11:25

## 2023-05-10 RX ADMIN — LOSARTAN POTASSIUM 100 MG: 100 TABLET, FILM COATED ORAL at 08:32

## 2023-05-10 RX ADMIN — CETIRIZINE HYDROCHLORIDE 10 MG: 10 TABLET, FILM COATED ORAL at 08:32

## 2023-05-10 RX ADMIN — ACETAMINOPHEN 650 MG: 325 TABLET ORAL at 11:25

## 2023-05-10 RX ADMIN — PANTOPRAZOLE SODIUM 40 MG: 40 TABLET, DELAYED RELEASE ORAL at 06:50

## 2023-05-10 RX ADMIN — ACETAMINOPHEN 650 MG: 325 TABLET ORAL at 05:23

## 2023-05-10 RX ADMIN — ONDANSETRON 4 MG: 4 TABLET, ORALLY DISINTEGRATING ORAL at 05:22

## 2023-05-10 ASSESSMENT — ACTIVITIES OF DAILY LIVING (ADL)
ADLS_ACUITY_SCORE: 21

## 2023-05-10 NOTE — PROGRESS NOTES
Observation goals  PRIOR TO DISCHARGE         -diagnostic tests and consults completed and resulted - Not met  -vital signs normal or at patient baseline - Met  -tolerating oral intake to maintain hydration - Met    Nurse to notify provider when observation goals have been met and patient is ready for discharge.

## 2023-05-10 NOTE — PROGRESS NOTES
Discharge summary and medication handed to pt. Teaching done and pt verbalized understanding. Pt discharged to home with family.

## 2023-05-10 NOTE — PROGRESS NOTES
Observation goals  PRIOR TO DISCHARGE       Comments: -diagnostic tests and consults completed and resulted Not met  -vital signs normal or at patient baseline Met  -tolerating oral intake to maintain hydration Met  Nurse to notify provider when observation goals have been met and patient is ready for discharge.

## 2023-05-10 NOTE — PROGRESS NOTES
"Observation goals Met    -vital signs normal or at patient baseline : Yes  -tolerating oral intake to maintain hydration : Pt consumed \" most\" of her dinner, abdominal discomfort managed with Tylenol. Reports pain is \"better\". Pt awaiting BM, senna given. LB 5/6  Nurse to notify provider when observation goals have been met and patient is ready for discharge.    Pt  Education given on condition such as orthostasis, gastroentitis, and medication Zofran / Discharge orders. Pt report given to Short-stay nurse. Pt transferred out by w/c and NA w possessions to room 514 at 2151.       "

## 2023-05-10 NOTE — PROGRESS NOTES
"         Assessment and Plan:   Hyponatremia: seen yesterday by Dr. Reardon. Felt due to water intake, thiazide, complicated by pain, N and V.   Na: 124 > 131 > 135. K 4.2, HCO3 27. Cr 0.83.   Yest: Emiliana 48, Uosm 164.   TSH 5/8 normal at 1.49.     05/09/23 2156 74.9 kg (165 lb 1.6 oz) Standing scale   05/09/23 0600 73.1 kg (161 lb 3.2 oz) Standing scale   05/07/23 2150 73.5 kg (162 lb 0.6 oz) Bed scale   05/07/23 1453 72.6 kg (160 lb)          Na has corrected.  No thiazide diuretics now or in future.     We will sign off.                   Interval History:   Hypertension: she is on losartan.           Bradycardia: not on beta blocker. Per Cards.            Review of Systems:   Feels well. No chest pain or SOB. She is drinking and eating normally.           Medications:       cetirizine  10 mg Oral Daily     FLUoxetine  20 mg Oral At Bedtime     fluticasone-vilanterol  1 puff Inhalation QPM     losartan  100 mg Oral Daily     [Held by provider] ondansetron  4 mg Oral Q6H     pantoprazole  40 mg Oral QAM AC     pramipexole  0.125 mg Oral At Bedtime     senna-docusate  1 tablet Oral BID     sodium chloride (PF)  3 mL Intracatheter Q8H         Current active medications and PTA medications reviewed, see medication list for details.            Physical Exam:   Vitals were reviewed  Patient Vitals for the past 24 hrs:   BP Temp Temp src Pulse Resp SpO2 Height Weight   05/10/23 0523 (!) 141/94 98.1  F (36.7  C) Oral 58 16 100 % -- --   05/09/23 2156 102/69 98.3  F (36.8  C) Oral 83 16 96 % 1.6 m (5' 3\") 74.9 kg (165 lb 1.6 oz)   05/09/23 1930 -- -- -- -- -- 99 % -- --   05/09/23 1926 106/67 98.5  F (36.9  C) Oral 79 16 96 % -- --   05/09/23 1540 120/86 98.2  F (36.8  C) Oral 96 16 97 % -- --       Temp:  [98.1  F (36.7  C)-98.5  F (36.9  C)] 98.1  F (36.7  C)  Pulse:  [58-96] 58  Resp:  [16] 16  BP: (102-141)/(67-94) 141/94  SpO2:  [96 %-100 %] 100 %    Temperatures:  Current - Temp: 98.1  F (36.7  C); Max - Temp  Avg: " 98.3  F (36.8  C)  Min: 98.1  F (36.7  C)  Max: 98.5  F (36.9  C)  Respiration range: Resp  Av  Min: 16  Max: 16  Pulse range: Pulse  Av  Min: 58  Max: 96  Blood pressure range: Systolic (24hrs), Av , Min:102 , Max:141   ; Diastolic (24hrs), Av, Min:67, Max:94    Pulse oximetry range: SpO2  Av.6 %  Min: 96 %  Max: 100 %    I/O last 3 completed shifts:  In: 240 [P.O.:240]  Out: -       Intake/Output Summary (Last 24 hours) at 5/10/2023 0929  Last data filed at 5/10/2023 0639  Gross per 24 hour   Intake 240 ml   Output --   Net 240 ml       Alert and responsive  Lungs with clear BS  Cor irreg, nl S1 S2 no M  LE no edema       Wt Readings from Last 4 Encounters:   23 74.9 kg (165 lb 1.6 oz)   22 70.3 kg (155 lb)   22 70.3 kg (155 lb)          Data:          Lab Results   Component Value Date     05/10/2023     2023     2023    Lab Results   Component Value Date    CHLORIDE 102 05/10/2023    CHLORIDE 90 2023    CHLORIDE 98 2023      Lab Results   Component Value Date    BUN 8.3 05/10/2023    BUN 6.3 2023    BUN 9.5 2023      Lab Results   Component Value Date    POTASSIUM 4.2 05/10/2023    POTASSIUM 3.8 2023    POTASSIUM 4.0 2023    Lab Results   Component Value Date    CO2 27 05/10/2023    CO2 27 2023    CO2 26 2023    Lab Results   Component Value Date    CR 0.83 05/10/2023    CR 0.79 2023    CR 0.75 2023        Recent Labs   Lab Test 23  0548 23  0452 23  1500   WBC 3.9* 4.6 6.8   HGB 10.2* 10.1* 11.5*   HCT 31.5* 30.4* 34.0*   MCV 92 91 90    244 282     Recent Labs   Lab Test 23  1500   AST 18   ALT 22   ALKPHOS 52   BILITOTAL 0.3       Recent Labs   Lab Test 05/10/23  0650 23  0548 23  0452   MAG 1.8 1.9 1.7     No results for input(s): PHOS in the last 67916 hours.  Recent Labs   Lab Test 05/10/23  0650 23  0548 23  0452   JORGE  8.8 8.6* 8.6*     Lab Results   Component Value Date    JORGE 8.8 05/10/2023     Lab Results   Component Value Date    WBC 3.9 (L) 05/09/2023    HGB 10.2 (L) 05/09/2023    HCT 31.5 (L) 05/09/2023    MCV 92 05/09/2023     05/09/2023     Lab Results   Component Value Date     (L) 05/10/2023    POTASSIUM 4.2 05/10/2023    CHLORIDE 102 05/10/2023    CO2 27 05/10/2023    GLC 87 05/10/2023     Lab Results   Component Value Date    BUN 8.3 05/10/2023    CR 0.83 05/10/2023     Lab Results   Component Value Date    MAG 1.8 05/10/2023     No results found for: PHOS    Creatinine   Date Value Ref Range Status   05/10/2023 0.83 0.51 - 0.95 mg/dL Final   05/09/2023 0.79 0.51 - 0.95 mg/dL Final   05/08/2023 0.75 0.51 - 0.95 mg/dL Final   05/07/2023 0.80 0.51 - 0.95 mg/dL Final       Attestation:  I have reviewed today's vital signs, notes, medications, labs and imaging.     Lorenzo Flor MD

## 2023-05-10 NOTE — PROGRESS NOTES
Observation goals  PRIOR TO DISCHARGE         -diagnostic tests and consults completed and resulted - Met  -vital signs normal or at patient baseline - Met  -tolerating oral intake to maintain hydration - Met    Nurse to notify provider when observation goals have been met and patient is ready for discharge.

## 2023-05-10 NOTE — PLAN OF CARE
Goal Outcome Evaluation:    Orientation/Cognitive: A&Ox4    Observation Goals (Met/ Not Met): Not met  Mobility Level/Assist Equipment: Indep   Fall Risk (Y/N): N  Behavior Concerns: None , pleasant  Pain Management: Mild HA managed with PRN Tylenol  Tele/VS/O2: VSS except HTN, Tele: SD  ABNL Lab/BG:AM labs  Diet: Reg  Bowel/Bladder:Continent, voiding adequately in BR  Skin Concerns: None  Drains/Devices: PIV SL  Tests/Procedures for next shift: AM labs  Anticipated DC date & active delays: Possible discharge home later today  with a 30-day event monitor and to follow up with cardiology outpatient   Patient denies any dizziness or N/V, pt is tolerating diet

## 2023-05-10 NOTE — DISCHARGE SUMMARY
[facility]  Hospitalist Discharge Summary      Date of Admission:  5/7/2023  Date of Discharge:  5/10/2023  Discharging Provider: Lita Martinez MD  Discharge Service: Hospitalist Service    Discharge Diagnoses   See below    Follow-ups Needed After Discharge   Follow-up Appointments     Follow-up and recommended labs and tests       Follow up with primary care provider, Eugene Rush, within 7 days to   evaluate medication change and for hospital follow- up.  The following   labs/tests are recommended: basic metabolic panel and complete blood   count.             Unresulted Labs Ordered in the Past 30 Days of this Admission     No orders found from 4/7/2023 to 5/8/2023.          Discharge Disposition   Discharged to home  Condition at discharge: Stable      Hospital Course   ej Ricketts is a 72 year old female with a history of GERD on omeprazole who uses meloxicam on a daily basis.  For the past 5 days she has had epigastric pain which is worsened with eating and has also been vomiting.  CT scan did not show any acute findings.  She has no fever and a normal white blood cell count.  She has a nonsurgical abdomen.  After receiving IV hydromorphone she has mild epigastric tenderness on exam.  In the emergency room she was treated with IV fluid and pantoprazole.  An additional finding was hyponatremia.     Epigastric pain improved  Vomiting-resolved  History of gastroesophageal reflux disease   Presents with n/vomiting/abdominal pain for about 5 days.  She uses meloxicam daily.given NSAIDs use, initial concern for peptic ulcer disease. CT abd/pelvis result reviewed, no acute finding to explain symptoms.  * EGD completed on 5/8, results reviewed and discussed with Dr. Medina-- shows LA grade B reflus esophagitis with no bleeding, small hiatal hernia and congestive gastropathy. Biopsied.   - diet advanced to regular diet and she is tolerating without nausea or vomiting.   - continue  with PTA Omeprazole daily, but increased dose to 40mg daily  - GI will arrange follow up  - prn zofran at discharge.   - patient is overall improved and tolerating diet. She did report a brief episode of sharp pain in her chest while eating that resolved.    Hyponatremia- improved  Suspect acute/subacute.  She is on hydrochlorothiazide and SSRI and now has been vomiting. Last BMP in care-everywhere was normal sodium. Suspect hypovolemia from poor oral intake and vomiting and diuretic use.   - TSH is normal  - Na improved from 126-->132 with IVF on 5/8, IVF continued, but sodium down to 124 am, ? Accuracy. IVF was stopped and recheck in about 6 hours was upto 131  - patient diet advanced and tolerated. Follow up Na today 135  - nephrology consulted, recommend avoiding Thiazide diuretics in the future  - stopped PTA triamterene-hydrochlorothiazide     Bradycardia, asymptomatic  Ectopic atrial rhythm  Noted to have bradycardia overnight of 5/7-5/8.  Cardiology reviewed EKG and braycardia with ectopic atrial rhythm. Per cardiology, this is asymtomatic and dizziness does not appear cardiac in origin  - TSH is normal range  - echocardiogram-LVEF 54%, no wall motion abnormalities  - cardiology followed appreciate assistance, signed off. Event monitor at discharge and cards follow up (already ordered by cards)    Dizziness-resolved  Noted overnight of 5/7-5/8. Per cardiology, less likely cardiac in origin. Dizziness is improved, however per discussed with patient, she reports that she has always had dizziness and exacerbated with rapid positional changes. She reports she currently has mild dizziness and mild headache. A week ago she has a bad headache that improved. No focal symptoms.    Headache-improved  States has been having headache for the last week or so. No other focal symptoms. But feels it has improved. CT head reprot- No CT findings of acute intracranial process. Specifically, no  acute intracranial hemorrhage,  extra axial fluid collection, or mass effect/herniation. Brain atrophy and presumed chronic ischemic changes     Hypertension   - Continue losartan   - stopped triamterene hydrochlorothiazide due to hyponatremia as above and should not be on thiazide in the future  - amlodipine 5mg daily started at discharge  - advised patient to check bp at home and f/u with PCP in clinic for further management     Asthma   Asymptomatic  Continue prior to admission inhaled bronchodilators     Restless legs syndrome   Continue pramipexole     Depression   Continue fluoxetine      Consultations This Hospital Stay   GASTROENTEROLOGY IP CONSULT  CARDIOLOGY IP CONSULT  NEPHROLOGY IP CONSULT    Code Status   Full Code    Time Spent on this Encounter   ILita MD, personally saw the patient today and spent 45 minutes discharging this patient.       Lita Martinez MD  [facility] EXTENDED RECOV[facility] AND SHORT STAY  [address] [place] [id number]  Phone: [phone]  ______________________________________________________________________    Physical Exam   Vital Signs: Temp: 98  F (36.7  C) Temp src: Oral BP: (!) 150/93 Pulse: (!) 39   Resp: 16 SpO2: 97 % O2 Device: None (Room air)    Weight: 165 lbs 1.6 oz  General Appearance: Alert, awake and no apparent distress  Respiratory: clear to ausculation bilaterally, no wheezing  Cardiovascular: regular rate and rhythm  GI: soft and mild epigastric tenderness  Skin: warm and dry        Primary Care Physician   Eugene Rush    Discharge Orders      Follow-Up with Cardiology ROSALIO      Reason for your hospital stay    You were in the hospital for vomiting and abdominal pain. You had upper endoscopy completed which showed some inflammation of the stomach, but no ulcers. You also were noted to have slow heart rate. You will discharge home with monitor to wear and cardiology follow up.     Follow-up and recommended labs and tests     Follow up with  primary care provider, Eugene Rush, within 7 days to evaluate medication change and for hospital follow- up.  The following labs/tests are recommended: basic metabolic panel and complete blood count.     Activity    Your activity upon discharge: activity as tolerated     Cardiac Event Monitor Adult Pediatric     Diet    Follow this diet upon discharge: Orders Placed This Encounter      Regular Diet Adult       Significant Results and Procedures   Most Recent 3 CBC's:  Recent Labs   Lab Test 05/09/23  0548 05/08/23  0452 05/07/23  1500   WBC 3.9* 4.6 6.8   HGB 10.2* 10.1* 11.5*   MCV 92 91 90    244 282     Most Recent 3 BMP's:  Recent Labs   Lab Test 05/10/23  0650 05/09/23  1229 05/09/23  0548 05/08/23  0452   * 131* 124* 132*   POTASSIUM 4.2  --  3.8 4.0   CHLORIDE 102  --  90* 98   CO2 27  --  27 26   BUN 8.3  --  6.3* 9.5   CR 0.83  --  0.79 0.75   ANIONGAP 6*  --  7 8   JORGE 8.8  --  8.6* 8.6*   GLC 87  --  87 92   ,   Results for orders placed or performed during the hospital encounter of 05/07/23   CT Abdomen Pelvis w Contrast    Narrative    EXAM: CT ABDOMEN PELVIS W CONTRAST  LOCATION: [facility]  DATE/TIME: 5/7/2023 6:29 PM CDT    INDICATION: Right lower quadrant abdominal pain for 4 days, vomiting.  Evaluate for appendicitis or other pathology  COMPARISON: None.  TECHNIQUE: CT scan of the abdomen and pelvis was performed following injection of IV contrast. Multiplanar reformats were obtained. Dose reduction techniques were used.  CONTRAST: 81mL Isovue 370        FINDINGS:   LOWER CHEST: Dependent atelectasis both lower lobes.    HEPATOBILIARY: Normal.    PANCREAS: Normal.    SPLEEN: Normal.    ADRENAL GLANDS: Normal.    KIDNEYS/BLADDER: Small cyst lower pole right kidney. No hydronephrosis. Moderately distended bladder.    BOWEL: Normal appendix. No evidence for bowel obstruction. Diverticula sigmoid colon, without evidence for diverticulitis.     LYMPH NODES:  Normal.    VASCULATURE: Unremarkable.    PELVIC ORGANS: Normal.    MUSCULOSKELETAL: Advanced degenerative disc disease lower thoracic and lumbar spine.      Impression    IMPRESSION:   1.  Normal appendix.  2.  No evidence for bowel obstruction.   3.  Sigmoid diverticulosis.  4.  No findings to account for the patient's symptoms.   Echocardiogram Complete     Value    LVEF  54%    Narrative    [id number]  [id number]  [id number]  [id number]^KATYA^MIRIAM^EZIO     [facility]  Echocardiography Laboratory  [address] [place] [id number]     Name: JUAN ALONSO  MRN: [id number]  : 1950  Study Date: 2023 08:02 AM  Age: 72 yrs  Gender: Female  Patient Location: [facility]  Reason For Study: Bradycardia - Sinus  Ordering Physician: MIRIAM ALDANA  Referring Physician: Eugene Rush  Performed By: Naya Lee     BSA: 1.8 m2  Height: 63 in  Weight: 162 lb  HR: 61  BP: 142/82 mmHg  ______________________________________________________________________________  Procedure  Complete Portable Echo Adult. Optison (NDC #[id number]) given intravenously.  ______________________________________________________________________________  Interpretation Summary     The visual ejection fraction is estimated at 54%.  The right ventricle is normal in size and function.  The inferior vena cava was normal in size with preserved respiratory  variability.  There is no pericardial effusion.  ______________________________________________________________________________  Left Ventricle  The left ventricle is normal in structure, function and size. The visual  ejection fraction is estimated at 54%. No regional wall motion abnormalities  noted.     Right Ventricle  The right ventricle is normal in size and function.     Atria  Normal left atrial size. Right atrial size is normal.     Mitral Valve  The mitral valve is normal in structure and function. There is trace mitral  regurgitation.     Tricuspid  Valve  The tricuspid valve is normal in structure and function. The right ventricular  systolic pressure is approximated at 29.0 mmHg plus the right atrial pressure.  There is trace to mild tricuspid regurgitation.     Aortic Valve  The aortic valve is trileaflet with aortic valve sclerosis. There is trace  aortic regurgitation. No hemodynamically significant valvular aortic stenosis.  The mean AoV pressure gradient is 8.0 mmHg. The peak AoV pressure gradient is  14.0 mmHg.     Pulmonic Valve  The pulmonic valve is not well visualized.     Vessels  The aortic root is normal size. The inferior vena cava was normal in size with  preserved respiratory variability.     Pericardium  There is no pericardial effusion.     ______________________________________________________________________________  MMode/2D Measurements & Calculations  IVSd: 0.83 cm  LVIDd: 4.7 cm  LVIDs: 3.2 cm  LVPWd: 0.92 cm  LVPWs: 0.67 cm  FS: 33.6 %  LV mass(C)d: 140.3 grams  LV mass(C)dI: 79.3 grams/m2     Ao root diam: 3.3 cm  LA dimension: 3.4 cm  asc Aorta Diam: 3.7 cm  LA/Ao: 1.0  LVOT diam: 2.0 cm  LVOT area: 3.1 cm2  LA Volume (BP): 46.8 ml  LA Volume Index (BP): 26.4 ml/m2  RWT: 0.39  TAPSE: 2.7 cm     Doppler Measurements & Calculations  MV E max vikram: 83.1 cm/sec  MV A max vikram: 81.8 cm/sec  MV E/A: 1.0     MV dec time: 0.26 sec  Ao V2 max: 186.0 cm/sec  Ao max P.0 mmHg  Ao V2 mean: 127.5 cm/sec  Ao mean P.0 mmHg  Ao V2 VTI: 43.5 cm  ISAAK(I,D): 1.5 cm2  ISAAK(V,D): 1.7 cm2  AI P1/2t: 601.1 msec  LV V1 max P.5 mmHg  LV V1 max: 104.9 cm/sec  LV V1 VTI: 21.2 cm  SV(LVOT): 64.8 ml  SI(LVOT): 36.7 ml/m2  PA acc time: 0.14 sec  TR max vikram: 269.1 cm/sec  TR max P.0 mmHg  AV Vikram Ratio (DI): 0.56  ISAAK Index (cm2/m2): 0.84  E/E' av.3  Lateral E/e': 9.8  Medial E/e': 12.7  RV S Vikram: 14.6 cm/sec     ______________________________________________________________________________  Report approved by: Regina Mejias 2023 08:59  AM               Discharge Medications   Current Discharge Medication List      START taking these medications    Details   amLODIPine (NORVASC) 5 MG tablet Take 1 tablet (5 mg) by mouth daily  Qty: 30 tablet, Refills: 0    Comments: Future refills by PCP Dr. Eugene Rush with phone number [phone].  Associated Diagnoses: Benign essential hypertension      ondansetron (ZOFRAN ODT) 4 MG ODT tab Take 1 tablet (4 mg) by mouth every 8 hours as needed for nausea  Qty: 10 tablet, Refills: 0    Associated Diagnoses: Nausea         CONTINUE these medications which have CHANGED    Details   omeprazole (PRILOSEC) 20 MG DR capsule Take 2 capsules (40 mg) by mouth daily before breakfast    Associated Diagnoses: Gastroesophageal reflux disease with esophagitis without hemorrhage         CONTINUE these medications which have NOT CHANGED    Details   acetaminophen (TYLENOL) 325 MG tablet Take 325-650 mg by mouth every 6 hours as needed for mild pain      albuterol (PROAIR HFA/PROVENTIL HFA/VENTOLIN HFA) 108 (90 Base) MCG/ACT inhaler Inhale 2 puffs into the lungs every 4 hours as needed      cetirizine (ZYRTEC) 10 MG tablet Take 10 mg by mouth daily      diclofenac (VOLTAREN) 1 % topical gel Apply 2-4 g topically 3 times daily as needed      diphenhydramine-zinc acetate (BENADRYL ITCH RELIEF STICK) 2-0.1 % external stick Apply 1 g topically as needed      !! FLUoxetine (PROZAC) 10 MG capsule Take 10 mg by mouth daily With 20 mg capsule for total daily dose of 30 mg      !! FLUoxetine (PROZAC) 20 MG capsule Take 1 capsule by mouth daily With 10 mg capsule for total daily dose of 30 mg      fluticasone (FLONASE) 50 MCG/ACT nasal spray Spray 1 spray into both nostrils daily      fluticasone-vilanterol (BREO ELLIPTA) 200-25 MCG/INH inhaler Inhale 1 puff into the lungs daily      losartan (COZAAR) 100 MG tablet Take 100 mg by mouth daily      meloxicam (MOBIC) 15 MG tablet Take 1 tablet by mouth daily      montelukast (SINGULAIR)  10 MG tablet Take 10 mg by mouth At Bedtime      pramipexole (MIRAPEX) 0.125 MG tablet Take 1 tablet by mouth At Bedtime      saline 0.65 % SOLN Spray 1 spray in nostril every 4 hours as needed       !! - Potential duplicate medications found. Please discuss with provider.      STOP taking these medications       mometasone-formoterol (DULERA) 200-5 MCG/ACT inhaler Comments:   Reason for Stopping:         triamterene-HCTZ (DYAZIDE) 37.5-25 MG capsule Comments:   Reason for Stopping:             Allergies   Allergies   Allergen Reactions     Oxycodone Nausea and Vomiting     Violent vomiting.  Violent vomiting.       Seasonal Allergies      Other reaction(s): EENT - sneezing  Hay fever     Beta Adrenergic Blockers Other (See Comments)     Contraindicated with immunotherapy     Lisinopril      Other reaction(s): Cough

## 2023-05-10 NOTE — PROGRESS NOTES
[facility]  Gastroenterology Progress Note     Jessica Ricketts MRN# [id number]   YOB: 1950 Age: 72 year old          Assessment and Plan:   Jessica Ricketts is a 72 year old female with c/o nausea and vomiting and abdominal pain. Had noted bradycardia with dizziness overnight and moved to CCU for further evaluation.     Abdominal pain, epigastric  Nausea and vomiting  Takes daily meloxicam   Has h/o GERD with no prior EGD  Abdominal pain associated with eating and unable to tolerate oral intake  Patient likely has PUD and possible gastroenteritis and some of abdominal pain is muscular related to retching  5/8 EGD noted grade B esophagitis but otherwise unremarkable. Biopsies taken  Source og symptoms likely gastroenteritis     -- advance diet as tolerated  -- prn zofran  --  pantoprazole 40 mg daily  -- ok with GI to discharge patient and f/u as an outpatient in 1-2 weeks                  Interval History:   denies chest pain, denies shortness of breath, alert, oriented to person, place and time and no vomiting. Nausea and vomiting resolved.              Review of Systems:   C: NEGATIVE for fever, chills, change in weight  E/M: NEGATIVE for ear, mouth and throat problems  R: NEGATIVE for significant cough or SOB  CV: NEGATIVE for chest pain, palpitations or peripheral edema             Medications:   I have reviewed this patient's current medications    cetirizine  10 mg Oral Daily     FLUoxetine  20 mg Oral At Bedtime     fluticasone-vilanterol  1 puff Inhalation QPM     losartan  100 mg Oral Daily     [Held by provider] ondansetron  4 mg Oral Q6H     pantoprazole  40 mg Oral QAM AC     pramipexole  0.125 mg Oral At Bedtime     senna-docusate  1 tablet Oral BID     sodium chloride (PF)  3 mL Intracatheter Q8H                  Physical Exam:   Vitals were reviewed  Vital Signs with Ranges  Temp:  [98.1  F (36.7  C)-98.5  F (36.9  C)] 98.1  F (36.7  C)  Pulse:  [58-96] 58  Resp:   [16] 16  BP: (102-141)/(67-94) 141/94  SpO2:  [96 %-100 %] 100 %  I/O last 3 completed shifts:  In: 240 [P.O.:240]  Out: -   Constitutional: healthy, alert and no distress   Cardiovascular: negative, PMI normal. No lifts, heaves, or thrills. RRR. No murmurs, clicks gallops or rub  Respiratory: negative, Percussion normal. Good diaphragmatic excursion. Lungs clear  Abdomen: Abdomen soft, mildly tender. BS normal. No masses, organomegaly           Data:   I reviewed the patient's new clinical lab test results.   Recent Labs   Lab Test 05/09/23  0548 05/08/23  0452 05/07/23  1500   WBC 3.9* 4.6 6.8   HGB 10.2* 10.1* 11.5*   MCV 92 91 90    244 282     Recent Labs   Lab Test 05/10/23  0650 05/09/23  0548 05/08/23  0452   POTASSIUM 4.2 3.8 4.0   CHLORIDE 102 90* 98   CO2 27 27 26   BUN 8.3 6.3* 9.5   ANIONGAP 6* 7 8     Recent Labs   Lab Test 05/07/23  1500 05/07/23  1459   ALBUMIN 4.1  --    BILITOTAL 0.3  --    ALT 22  --    AST 18  --    PROTEIN  --  Negative       I reviewed the patient's new imaging results.    All laboratory data reviewed  All imaging studies reviewed by me.    Melonie Carter PA-C,  5/9/2023  [facility]  Office : [phone]  Cell: [phone] (Dr. Medina)  Cell: [phone] (Melonie Carter PA-C)

## 2023-05-12 ENCOUNTER — TELEPHONE (OUTPATIENT)
Dept: CARDIOLOGY | Facility: CLINIC | Age: 73
End: 2023-05-12
Payer: COMMERCIAL

## 2023-05-12 NOTE — TELEPHONE ENCOUNTER
Patient was admitted to [facility] on 5/7/23 with epigastric pain which is worsened with eating and has also been vomiting. Noted to have bradycardia overnight of 5/7/23-5/8/23. Cardiology reviewed EKG and bradycardia with ectopic atrial rhythm    PMH: GERD    5/8/23: EGD completed and showed LA grade B reflux esophagitis with no bleeding, small hiatal hernia and congestive gastropathy. Biopsied.     5/9/23: Echo showed EF of 54%. The right ventricle is normal in size and function.    Pt was started on Norvasc and Zofran. PTA Dulera and Dyazide was discontinued at time of discharge. Pt was discharged wearing a 14 day Zio Patch monitor.    Called patient to discuss any post hospital d/c questions she may have, review medication changes, and confirm f/u appts. Patient denied any questions regarding new medications or changes to PTA medications.     Patient denied any SOB, chest pain, or light headedness.    RN confirmed with patient that she is scheduled for an OV on 6/5/23 at 0820 with Alesia Mcclelland at our [place] Office.    Patient advised to call clinic with any cardiac related questions or concerns prior to this kyara't. Patient verbalized understanding and agreed with plan. ANAIS Ko RN.

## 2023-05-15 ENCOUNTER — CARE COORDINATION (OUTPATIENT)
Dept: CARDIOLOGY | Facility: CLINIC | Age: 73
End: 2023-05-15
Payer: COMMERCIAL

## 2023-05-15 NOTE — PROGRESS NOTES
"Received event monitor update from 5/13/23. Pt was admitted 5/7 -5/10/23 for nausea/vomiting, hyponatremia, dizziness, and bradycardia.  consulted on 5/9/23. Per , \"  1. Her dizziness does not appear to be cardiac in origin and it is more exacerbated by orthostasis.  2. Echocardiogram is normal.    3. She is okay to discharge from a cardiology standpoint.  4. At discharge I would anticipate that she would have a 30-day event monitor and I may have into a stress ECG study on her as an outpatient depending on what we find.  5. Recommend outpatient follow-up in the cardiology clinic in 3 weeks with results of event monitor with an ROSALIO.\"    Will route update to . Carmen SANTANA May 15, 2023, 5:07 PM              "

## 2023-05-16 NOTE — PROGRESS NOTES
I called pt to review recommendation from  to appt tomorrow to discuss her new afib finding on event monitor. Pt said she has had occasional shortness of breath with activity for awhile and now wonders if it has been from afib. Pt said she is unable to make it to the visit tomorrow, but I was able to schedule her 5/18 @ 12:45 in [place] (slot on hold, will message scheduling to add pt) with . Pt said she did trigger the monitor one other time recently and doesn't think it was on 5/13. Will watch to see if more strips are sent prior to the visit. I provided pt with 's nurse team number, and she will call with any questions or concerns prior to her visit. Carmen SANTANA May 16, 2023, 10:32 AM

## 2023-05-18 ENCOUNTER — OFFICE VISIT (OUTPATIENT)
Dept: CARDIOLOGY | Facility: CLINIC | Age: 73
End: 2023-05-18
Payer: COMMERCIAL

## 2023-05-18 VITALS
BODY MASS INDEX: 28.53 KG/M2 | DIASTOLIC BLOOD PRESSURE: 86 MMHG | OXYGEN SATURATION: 99 % | SYSTOLIC BLOOD PRESSURE: 123 MMHG | HEART RATE: 61 BPM | WEIGHT: 161 LBS | HEIGHT: 63 IN

## 2023-05-18 DIAGNOSIS — I48.0 PAROXYSMAL ATRIAL FIBRILLATION (H): Primary | ICD-10-CM

## 2023-05-18 PROCEDURE — 99214 OFFICE O/P EST MOD 30 MIN: CPT | Performed by: INTERNAL MEDICINE

## 2023-05-18 NOTE — PROGRESS NOTES
CARDIOLOGY CLINIC CONSULTATION    PRIMARY CARE PHYSICIAN:  Teressa Aquino    HISTORY OF PRESENT ILLNESS:  This is an extremely pleasant woman who saw me recently during a inpatient hospital stay when she presented to [facility] with nausea and dizziness.  She had asymptomatic bradycardia and ectopic atrial rhythm.  Echocardiogram was essentially normal.  She had no other cardiac symptoms.  We did a outpatient event monitor for 30 days which she is currently still waiting but that reported episodes of atrial fibrillation.  These were self-limiting and paroxysmal.  Patient was asymptomatic through that.  She has baseline bradycardia and she is not on any AV suzanne blocking agents.  Patient denies any bleeding problems.  She has a history of hypertension.  That is controlled with current medications.    Today she is here in cardiology clinic to discuss her cardiopulmonary findings.  No angina heart failure syncope presyncope reported.    PAST MEDICAL HISTORY:  Past Medical History:   Diagnosis Date     Allergic asthma, mild intermittent, with acute exacerbation 3/3/2012     Allergic rhinitis 10/7/2019    Formatting of this note might be different from the original. 10/7/2019 - Dr. Barry BOJORQUEZ: Perennial and seasonal rhinitis, poorly controlled with Flonase, Allegra and Singulair. Allergy skin tests show strongest reactivity to cat dander, guinea pigs, ragweed; moderate reactivity to dog dander, dust mites, molds and tree pollens.   P -Reviewed with patient perennial and seasonal allergen avoidance and     Benign neoplasm of colon 12/30/2003     Bilateral carpal tunnel syndrome 5/7/2023     Candidiasis 10/19/2018     Essential hypertension, benign 10/31/2002     Gastroesophageal reflux disease with esophagitis 9/13/2001     History of total knee replacement 11/17/2014    Last Assessment & Plan:  Formatting of this note might be different from the original. 69yoF with h/o right TKA in Nov 2014 with new  fall onto right knee 2-3 weeks ago with increased pain. Imaging negative for fracture or hardware failure, and pain is improving.   -Reassured TKA is in place without loosening or fractures noted  -Continue activities as tolerated  -Continue exercises for ROM, streng     Hypersomnia with sleep apnea 11/1/2007     Mild single current episode of major depressive disorder (H) 12/16/2017     Moderate persistent asthma without complication 6/12/2019    Formatting of this note might be different from the original. 10/7/2019 - Dr. Reddy  A: recurrent cough and wheeze not well controlled with Flovent 110. Allergy skin tests shows reactivity to multiple perennial and seasonal allergens that can exacerbate asthma control. Monitor for improvement with allergen desensitization.   P -Reviewed with patient the signs of asthma control include no cough or      Myopia 5/1/2008     BETO (obstructive sleep apnea) 5/7/2023     Osteopenia determined by x-ray 6/8/2018     Presbyopia 5/1/2008     Recurrent major depressive disorder, in partial remission (H) 11/6/2020     Restless leg syndrome 2/10/2019     Sensorineural hearing loss (SNHL) of left ear with unrestricted hearing of right ear 9/21/2022     Spondylolisthesis at L5-S1 level 11/1/2017       MEDICATIONS:  Current Outpatient Medications   Medication     acetaminophen (TYLENOL) 325 MG tablet     albuterol (PROAIR HFA/PROVENTIL HFA/VENTOLIN HFA) 108 (90 Base) MCG/ACT inhaler     amLODIPine (NORVASC) 5 MG tablet     cetirizine (ZYRTEC) 10 MG tablet     diclofenac (VOLTAREN) 1 % topical gel     diphenhydramine-zinc acetate (BENADRYL ITCH RELIEF STICK) 2-0.1 % external stick     FLUoxetine (PROZAC) 10 MG capsule     FLUoxetine (PROZAC) 20 MG capsule     fluticasone (FLONASE) 50 MCG/ACT nasal spray     fluticasone-vilanterol (BREO ELLIPTA) 200-25 MCG/INH inhaler     losartan (COZAAR) 100 MG tablet     montelukast (SINGULAIR) 10 MG tablet     omeprazole (PRILOSEC) 20 MG DR capsule      ondansetron (ZOFRAN ODT) 4 MG ODT tab     pramipexole (MIRAPEX) 0.125 MG tablet     rivaroxaban ANTICOAGULANT (XARELTO) 20 MG TABS tablet     saline 0.65 % SOLN     meloxicam (MOBIC) 15 MG tablet     No current facility-administered medications for this visit.       ALLERGIES:  Allergies   Allergen Reactions     Oxycodone Nausea and Vomiting     Violent vomiting.  Violent vomiting.       Seasonal Allergies      Other reaction(s): EENT - sneezing  Hay fever     Beta Adrenergic Blockers Other (See Comments)     Contraindicated with immunotherapy     Lisinopril      Other reaction(s): Cough       SOCIAL HISTORY:  I have reviewed this patient's social history and updated it with pertinent information if needed. Jessica Ricketts  reports that she has never smoked. She has never used smokeless tobacco. She reports current alcohol use. She reports that she does not use drugs.    FAMILY HISTORY:  I have reviewed this patient's family history and updated it with pertinent information if needed.   Family History   Problem Relation Age of Onset     Hypertension Mother      Hypertension Father      Heart Failure Father      Hypertension Brother        PHYSICAL EXAM:      BP: 123/86 Pulse: 61     SpO2: 99 %      Vital Signs with Ranges  Pulse:  [61] 61  BP: (123)/(86) 123/86  SpO2:  [99 %] 99 %  161 lbs 0 oz    Constitutional: alert, no distress  Respiratory: Good bilateral air entry  Cardiovascular: Normal regular heart sounds intermittent ectopics no murmur rubs or gallops  GI: nondistended  Neuropsychiatric: appropriate affact    ASSESSMENT: Pertinent issues addressed/ reviewed during this cardiology visit  Paroxysmal atrial fibrillation  Hypertension    RECOMMENDATIONS:  1. Patient is overall asymptomatic from a cardiac standpoint.  2. Reviewed rate versus rhythm control approaches with the patient.  She has paroxysmal atrial fibrillation and currently wearing the monitor.  I would like to assess her overall A-fib burden after  the monitor is turned in.  At this time she will not tolerate AV suzanne blocking agents as well I believe because of baseline bradycardia.  Given that she is symptomatic, hold off on any changes at this point in that regard.  3. Anticoagulation is warranted given female sex age and history of hypertension.  She denies any active bleeding problems.  Start Xarelto 20 mg daily.  Follow-up with CBC in 3 months.  4. Risk benefits of bleeding stroke prevention rationale explained to the patient.  She is willing to proceed.    Return to clinic in 3 months.  After that we will space out visits if feeling okay.  If A-fib burden is high, we may need rhythm control plan.    It was a pleasure seeing this patient in clinic today. Please do not hesitate to contact me with any future questions.     LAWRENCE Mejias, [facility]  Cardiology - [facility]  May 18, 2023    Review of the result(s) of each unique test - Last ECG Echo labs CBC     The level of medical decision making during this visit was of moderate complexity.    This note was completed in part using dictation via the Dragon voice recognition software. Some word and grammatical errors may occur and must be interpreted in the appropriate clinical context.  If there are any questions pertaining to this issue, please contact me for further clarification.

## 2023-05-18 NOTE — LETTER
5/18/2023    Teressa Aquino, APRN CNP  [address] [facility] [address]  [facility] [address]    RE: Jessica Ricketts       Dear Colleague,     I had the pleasure of seeing Jessica Snyderclarisa in the [facility].  CARDIOLOGY CLINIC CONSULTATION    PRIMARY CARE PHYSICIAN:  Teressa Aquino    HISTORY OF PRESENT ILLNESS:  This is an extremely pleasant woman who saw me recently during a inpatient hospital stay when she presented to [facility] with nausea and dizziness.  She had asymptomatic bradycardia and ectopic atrial rhythm.  Echocardiogram was essentially normal.  She had no other cardiac symptoms.  We did a outpatient event monitor for 30 days which she is currently still waiting but that reported episodes of atrial fibrillation.  These were self-limiting and paroxysmal.  Patient was asymptomatic through that.  She has baseline bradycardia and she is not on any AV suzanne blocking agents.  Patient denies any bleeding problems.  She has a history of hypertension.  That is controlled with current medications.    Today she is here in cardiology clinic to discuss her cardiopulmonary findings.  No angina heart failure syncope presyncope reported.    PAST MEDICAL HISTORY:  Past Medical History:   Diagnosis Date    Allergic asthma, mild intermittent, with acute exacerbation 3/3/2012    Allergic rhinitis 10/7/2019    Formatting of this note might be different from the original. 10/7/2019 - Dr. Barry BOJORQUEZ: Perennial and seasonal rhinitis, poorly controlled with Flonase, Allegra and Singulair. Allergy skin tests show strongest reactivity to cat dander, guinea pigs, ragweed; moderate reactivity to dog dander, dust mites, molds and tree pollens.   P -Reviewed with patient perennial and seasonal allergen avoidance and    Benign neoplasm of colon 12/30/2003    Bilateral carpal tunnel syndrome 5/7/2023    Candidiasis 10/19/2018    Essential hypertension, benign 10/31/2002    Gastroesophageal reflux disease with  esophagitis 9/13/2001    History of total knee replacement 11/17/2014    Last Assessment & Plan:  Formatting of this note might be different from the original. 69yoF with h/o right TKA in Nov 2014 with new fall onto right knee 2-3 weeks ago with increased pain. Imaging negative for fracture or hardware failure, and pain is improving.   -Reassured TKA is in place without loosening or fractures noted  -Continue activities as tolerated  -Continue exercises for ROM, streng    Hypersomnia with sleep apnea 11/1/2007    Mild single current episode of major depressive disorder (H) 12/16/2017    Moderate persistent asthma without complication 6/12/2019    Formatting of this note might be different from the original. 10/7/2019 - Dr. Reddy  A: recurrent cough and wheeze not well controlled with Flovent 110. Allergy skin tests shows reactivity to multiple perennial and seasonal allergens that can exacerbate asthma control. Monitor for improvement with allergen desensitization.   P -Reviewed with patient the signs of asthma control include no cough or     Myopia 5/1/2008    BETO (obstructive sleep apnea) 5/7/2023    Osteopenia determined by x-ray 6/8/2018    Presbyopia 5/1/2008    Recurrent major depressive disorder, in partial remission (H) 11/6/2020    Restless leg syndrome 2/10/2019    Sensorineural hearing loss (SNHL) of left ear with unrestricted hearing of right ear 9/21/2022    Spondylolisthesis at L5-S1 level 11/1/2017       MEDICATIONS:  Current Outpatient Medications   Medication    acetaminophen (TYLENOL) 325 MG tablet    albuterol (PROAIR HFA/PROVENTIL HFA/VENTOLIN HFA) 108 (90 Base) MCG/ACT inhaler    amLODIPine (NORVASC) 5 MG tablet    cetirizine (ZYRTEC) 10 MG tablet    diclofenac (VOLTAREN) 1 % topical gel    diphenhydramine-zinc acetate (BENADRYL ITCH RELIEF STICK) 2-0.1 % external stick    FLUoxetine (PROZAC) 10 MG capsule    FLUoxetine (PROZAC) 20 MG capsule    fluticasone (FLONASE) 50 MCG/ACT nasal spray     fluticasone-vilanterol (BREO ELLIPTA) 200-25 MCG/INH inhaler    losartan (COZAAR) 100 MG tablet    montelukast (SINGULAIR) 10 MG tablet    omeprazole (PRILOSEC) 20 MG DR capsule    ondansetron (ZOFRAN ODT) 4 MG ODT tab    pramipexole (MIRAPEX) 0.125 MG tablet    rivaroxaban ANTICOAGULANT (XARELTO) 20 MG TABS tablet    saline 0.65 % SOLN    meloxicam (MOBIC) 15 MG tablet     No current facility-administered medications for this visit.       ALLERGIES:  Allergies   Allergen Reactions    Oxycodone Nausea and Vomiting     Violent vomiting.  Violent vomiting.      Seasonal Allergies      Other reaction(s): EENT - sneezing  Hay fever    Beta Adrenergic Blockers Other (See Comments)     Contraindicated with immunotherapy    Lisinopril      Other reaction(s): Cough       SOCIAL HISTORY:  I have reviewed this patient's social history and updated it with pertinent information if needed. Jessica Ricketts  reports that she has never smoked. She has never used smokeless tobacco. She reports current alcohol use. She reports that she does not use drugs.    FAMILY HISTORY:  I have reviewed this patient's family history and updated it with pertinent information if needed.   Family History   Problem Relation Age of Onset    Hypertension Mother     Hypertension Father     Heart Failure Father     Hypertension Brother        PHYSICAL EXAM:      BP: 123/86 Pulse: 61     SpO2: 99 %      Vital Signs with Ranges  Pulse:  [61] 61  BP: (123)/(86) 123/86  SpO2:  [99 %] 99 %  161 lbs 0 oz    Constitutional: alert, no distress  Respiratory: Good bilateral air entry  Cardiovascular: Normal regular heart sounds intermittent ectopics no murmur rubs or gallops  GI: nondistended  Neuropsychiatric: appropriate affact    ASSESSMENT: Pertinent issues addressed/ reviewed during this cardiology visit  Paroxysmal atrial fibrillation  Hypertension    RECOMMENDATIONS:  Patient is overall asymptomatic from a cardiac standpoint.  Reviewed rate versus rhythm  control approaches with the patient.  She has paroxysmal atrial fibrillation and currently wearing the monitor.  I would like to assess her overall A-fib burden after the monitor is turned in.  At this time she will not tolerate AV suzanne blocking agents as well I believe because of baseline bradycardia.  Given that she is symptomatic, hold off on any changes at this point in that regard.  Anticoagulation is warranted given female sex age and history of hypertension.  She denies any active bleeding problems.  Start Xarelto 20 mg daily.  Follow-up with CBC in 3 months.  Risk benefits of bleeding stroke prevention rationale explained to the patient.  She is willing to proceed.    Return to clinic in 3 months.  After that we will space out visits if feeling okay.  If A-fib burden is high, we may need rhythm control plan.    It was a pleasure seeing this patient in clinic today. Please do not hesitate to contact me with any future questions.     LAWRENCE Mejias, [facility]  Cardiology - [facility]  May 18, 2023    Review of the result(s) of each unique test - Last ECG Echo labs CBC     The level of medical decision making during this visit was of moderate complexity.    This note was completed in part using dictation via the Dragon voice recognition software. Some word and grammatical errors may occur and must be interpreted in the appropriate clinical context.  If there are any questions pertaining to this issue, please contact me for further clarification.      Thank you for allowing me to participate in the care of your patient.      Sincerely,     Dorys Parnell MD     [facility]  cc:   No referring provider defined for this encounter.

## 2023-05-21 ENCOUNTER — HEALTH MAINTENANCE LETTER (OUTPATIENT)
Age: 73
End: 2023-05-21

## 2023-05-21 ENCOUNTER — MYC MEDICAL ADVICE (OUTPATIENT)
Dept: CARDIOLOGY | Facility: CLINIC | Age: 73
End: 2023-05-21
Payer: COMMERCIAL

## 2023-05-22 NOTE — TELEPHONE ENCOUNTER
I left a message for pt to call back to clarify what questions she has about her monitor. I am confused as to whether she had a brand new 30 day event monitor placed today or whether she just replaced her electrodes. I told pt if she has questions about how to return the monitor she should contact our EKG dept at [phone]. Carmen SANTANA May 22, 2023, 2:23 PM

## 2023-05-23 PROCEDURE — 88305 TISSUE EXAM BY PATHOLOGIST: CPT | Mod: TC,ORL | Performed by: INTERNAL MEDICINE

## 2023-05-23 PROCEDURE — 88305 TISSUE EXAM BY PATHOLOGIST: CPT | Mod: 26 | Performed by: PATHOLOGY

## 2023-05-24 ENCOUNTER — LAB REQUISITION (OUTPATIENT)
Dept: LAB | Facility: CLINIC | Age: 73
End: 2023-05-24
Payer: COMMERCIAL

## 2023-05-24 DIAGNOSIS — K57.30 DIVERTICULOSIS OF LARGE INTESTINE WITHOUT PERFORATION OR ABSCESS WITHOUT BLEEDING: ICD-10-CM

## 2023-05-24 DIAGNOSIS — K64.8 OTHER HEMORRHOIDS: ICD-10-CM

## 2023-05-24 DIAGNOSIS — D12.3 BENIGN NEOPLASM OF TRANSVERSE COLON: ICD-10-CM

## 2023-05-24 DIAGNOSIS — Z12.11 ENCOUNTER FOR SCREENING FOR MALIGNANT NEOPLASM OF COLON: ICD-10-CM

## 2023-05-24 DIAGNOSIS — K64.4 RESIDUAL HEMORRHOIDAL SKIN TAGS: ICD-10-CM

## 2023-05-25 ENCOUNTER — TELEPHONE (OUTPATIENT)
Dept: CARDIOLOGY | Facility: CLINIC | Age: 73
End: 2023-05-25
Payer: COMMERCIAL

## 2023-05-25 DIAGNOSIS — I48.0 PAROXYSMAL ATRIAL FIBRILLATION (H): Primary | ICD-10-CM

## 2023-05-25 LAB
PATH REPORT.COMMENTS IMP SPEC: NORMAL
PATH REPORT.COMMENTS IMP SPEC: NORMAL
PATH REPORT.FINAL DX SPEC: NORMAL
PATH REPORT.GROSS SPEC: NORMAL
PATH REPORT.MICROSCOPIC SPEC OTHER STN: NORMAL
PATH REPORT.RELEVANT HX SPEC: NORMAL
PHOTO IMAGE: NORMAL

## 2023-05-25 NOTE — TELEPHONE ENCOUNTER
[facility] Call Center    Phone Message    May a detailed message be left on voicemail: yes     Reason for Call: Other: Patient called requesting to speak with a member of her care team ASAP. Patient states her monitor has been shut off. Please call patient back to further discuss, thank you.     Action Taken: Message routed to:  Other: Cardiology    Travel Screening: Not Applicable     Thank you!  [facility]

## 2023-05-25 NOTE — TELEPHONE ENCOUNTER
Can I please have the copy of the event monitor that has been done so far and then we can decide if a longer duration of monitor is required.    Josefina, please see patient's concerns about insurance about me not being in the network but the rest of the providers are.

## 2023-05-25 NOTE — TELEPHONE ENCOUNTER
Call out to pt re: concerns that monitor has been completely shut off by Manuel at day 15 and she needs to return the monitor but she was told to wear the monitor for 30 days.    Spoke w/pt she confirmed Manuel called her and said it's no longer recording and she needs to return the unit. They said Dr. Parnell will need to place a new order if he wants her to wear it for an additional period. I reviewed the order placed by Dr. Parnell - it was ordered as a 14 day event monitor not 30 days so that is why Manuel shut off the monitor and asked pt to return it.     Originally it looks like the monitor was ordered at hospital discharge and she had not had any afib at that time. Dr. Parnell wanted the monitor to look for arrhythmias. She had some afib found early in the monitoring period and then saw Dr. Parnell on 5/18/23 in clinic. That note states he wants to assess afib burden with this event monitor she is currently wearing.     Will message Dr. Parnell to see if he wants a new order for a 14 day ziopatch monitor placed so he can assess afib burden, or if he wants to wait for the results of the 14 day event monitor and assess whether another monitor is needed or not.     Pt also told me that Dr. Parnell is not covered by her insurance ([employer]), but [employer] is. She said she even spoke w/an insurance rep who confirmed Dr. Parnell was not listed as a provider in their network. Pt said other providers in our group are listed but not him. I told pt I'll look into that and get back to her. Annette Valverde RN on 5/25/2023 at 11:21 AM

## 2023-06-16 ENCOUNTER — MYC MEDICAL ADVICE (OUTPATIENT)
Dept: CARDIOLOGY | Facility: CLINIC | Age: 73
End: 2023-06-16
Payer: COMMERCIAL

## 2023-06-19 NOTE — TELEPHONE ENCOUNTER
Event monitor end of service summary report placed in Dr. Parnell inbox to review/sign. Annette Valverde RN on 6/19/2023 at 3:37 PM

## 2023-06-20 NOTE — TELEPHONE ENCOUNTER
Routed [email or web address] 14 day event monitor summary and a few EKG tracings to Dr. Parnell to review/send recommendations. He was wanting to quantify A fib burden to determine whether rate vs rhythm control strategy was best. Event monitor doesn't quantify burden. Pt has also notified us since the monitor has been removed she got an Apple watch and continues to be alerted of paroxsymal a fib episodes (haven't confirmed or seen strips) a couple of short episodes per day over the past few days. Does MD want a ziopatch ordered or is there enough data here to determine best strategy? She is on OAC (Xarelto). Hard copy placed in MD bin at team desk to sign. Annette Valverde RN on 6/20/2023 at 4:58 PM

## 2023-06-22 ENCOUNTER — TELEPHONE (OUTPATIENT)
Dept: CARDIOLOGY | Facility: CLINIC | Age: 73
End: 2023-06-22
Payer: COMMERCIAL

## 2023-06-22 NOTE — TELEPHONE ENCOUNTER
Event monitor end of service summary signed by Dr. Parnell and sent to [facility] to scan per in clinic nurse. See 6/20/23 call routed to MD for recommendations after review of results -- awaiting response. Annette Valverde RN on 6/22/2023 at 12:33 PM

## 2023-06-22 NOTE — TELEPHONE ENCOUNTER
Thank you. Can we please make referral to EP (first available here or the U) for further discussions. She is not on AV blocking agents, and during AF her HR is reasonable. She should call us if HR is sustained over 130 and does not terminate or she is symptomatic in the interim.

## 2023-06-22 NOTE — TELEPHONE ENCOUNTER
New EP MD follow up order placed per Dr. Parnell, based on monitor summary and pt notification of p. afib episodes noted on her apple watch. Updated pt w/comments and recommendations per Dr. Parnell via [employer]. Annette Valverde RN on 6/22/2023 at 3:23 PM      , MD Dorys  You 13 minutes ago (2:58 PM)     KV  Thank you. Can we please make referral to EP (first available here or the U) for further discussions. She is not on AV blocking agents, and during AF her HR is reasonable. She should call us if HR is sustained over 130 and does not terminate or she is symptomatic in the interim.          Note

## 2023-07-11 ENCOUNTER — OFFICE VISIT (OUTPATIENT)
Dept: URGENT CARE | Facility: URGENT CARE | Age: 73
End: 2023-07-11
Payer: COMMERCIAL

## 2023-07-11 VITALS
HEART RATE: 60 BPM | DIASTOLIC BLOOD PRESSURE: 91 MMHG | SYSTOLIC BLOOD PRESSURE: 135 MMHG | OXYGEN SATURATION: 98 % | WEIGHT: 157 LBS | BODY MASS INDEX: 27.81 KG/M2 | TEMPERATURE: 98.2 F

## 2023-07-11 DIAGNOSIS — J01.00 ACUTE NON-RECURRENT MAXILLARY SINUSITIS: Primary | ICD-10-CM

## 2023-07-11 PROCEDURE — 99213 OFFICE O/P EST LOW 20 MIN: CPT

## 2023-07-11 RX ORDER — DOXYCYCLINE HYCLATE 100 MG
100 TABLET ORAL 2 TIMES DAILY
Qty: 14 TABLET | Refills: 0 | Status: SHIPPED | OUTPATIENT
Start: 2023-07-11 | End: 2023-07-18

## 2023-07-11 NOTE — PATIENT INSTRUCTIONS
Take the antibiotic as prescribed and finish the full course even if symptoms improve.  Try yogurt with active cultures or probiotics such as Culturelle daily to help prevent diarrhea while using antibiotics.  You can also use a nasal saline spray and/or [place] Pot for your symptoms.  Follow up with your primary care provider in 7-10 should symptoms persist.

## 2023-07-11 NOTE — PROGRESS NOTES
ASSESSMENT:  (J01.00) Acute non-recurrent maxillary sinusitis  (primary encounter diagnosis)  Plan: doxycycline hyclate (VIBRA-TABS) 100 MG tablet    PLAN:  Sinusitis antibiotic treatment patient instructions discussed and provided.  Informed the patient to take the antibiotic as prescribed and finish the full course even if symptoms improve.  Discussed the need for the patient to try yogurt with active cultures or probiotic such as Culturelle daily to help prevent diarrhea while taking the antibiotic.  Informed her she can use a nasal saline spray and/or [place] pot for her symptoms.  Discussed the need for her to follow-up with her primary care provider in 7 to 10 days should symptoms persist.  Patient acknowledged her understanding of the above plan.    The use of Dragon/PowerMic dictation services may have been used to construct the content in this note; any grammatical or spelling errors are non-intentional. Please contact the author of this note directly if you are in need of any clarification.      Jacob Gerard, APRN CNP      SUBJECTIVE:  Jessica Ricketts is a 72 year old female who presents to the clinic today with a chief complaint of cough  for 6 day(s).  Her cough is described as productive clear.  The patient's symptoms are moderate and worsening.  Associated symptoms include congestion, nasal congestion, rhinorrhea, shortness of breath and fatigue. The patient's symptoms are exacerbated by lying down  Patient has been using drinking fluids, albuterol and prednisone to improve symptoms.    ROS  Negative except noted above.     OBJECTIVE:  BP (!) 135/91   Pulse 60   Temp 98.2  F (36.8  C) (Oral)   Wt 71.2 kg (157 lb)   SpO2 98%   BMI 27.81 kg/m    GENERAL APPEARANCE: healthy, alert and no distress  EYES: EOMI,  PERRL, conjunctiva clear  HENT: TM's normal bilaterally, nasal turbinates erythematous, swollen, oral mucous membranes moist, no erythema noted and maxillary sinus tenderness   NECK:  supple, nontender, no lymphadenopathy  RESP: lungs clear to auscultation - no rales, rhonchi or wheezes  CV: regular rates and rhythm, normal S1 S2, no murmur noted  SKIN: no suspicious lesions or rashes

## 2023-08-01 ENCOUNTER — OFFICE VISIT (OUTPATIENT)
Dept: CARDIOLOGY | Facility: CLINIC | Age: 73
End: 2023-08-01
Payer: COMMERCIAL

## 2023-08-01 VITALS
HEART RATE: 71 BPM | SYSTOLIC BLOOD PRESSURE: 136 MMHG | WEIGHT: 171 LBS | BODY MASS INDEX: 31.47 KG/M2 | HEIGHT: 62 IN | DIASTOLIC BLOOD PRESSURE: 91 MMHG

## 2023-08-01 DIAGNOSIS — I48.0 PAROXYSMAL ATRIAL FIBRILLATION (H): Primary | ICD-10-CM

## 2023-08-01 PROCEDURE — 99204 OFFICE O/P NEW MOD 45 MIN: CPT | Performed by: INTERNAL MEDICINE

## 2023-08-01 RX ORDER — MULTIPLE VITAMINS W/ MINERALS TAB 9MG-400MCG
1 TAB ORAL DAILY
COMMUNITY

## 2023-08-01 RX ORDER — DILTIAZEM HYDROCHLORIDE 180 MG/1
180 CAPSULE, EXTENDED RELEASE ORAL DAILY
Qty: 30 CAPSULE | Refills: 11 | Status: SHIPPED | OUTPATIENT
Start: 2023-08-01 | End: 2023-08-02

## 2023-08-01 NOTE — PROGRESS NOTES
"PHYSICIAN NOTE:  This visit was completed in person at the [facility].      I had the pleasure of seeing Ms. Jessica Ricketts for evaluation of atrial fibrillation.  She has been referred by Dr. Parnell.    Very pleasant 72-year-old female who was recently hospitalized at [facility] with nausea and dizziness.  She was noted to have asymptomatic bradycardia and ectopic atrial rhythm.  Echocardiogram was essentially normal.    She she was discharged home with a 2-week cardiac monitor which I reviewed today.  It showed episodes of paroxysmal AF with RVR, at least 2 separate events on 5/13 and 5/21/2023.  The exact AF burden is unclear given the type of monitor but it appears that one event may have lasted several hours.  There was no significant bradycardia.  Occasional episodes of \"dizziness\" correlated with sinus rhythm.    The patient was started on rivaroxaban by Dr. Parnell.  She has tolerated this well.  She remains largely unaware of episodes of AF though she states that her iwatch indicates that she has AF most days.  The heart rate indicated by her watch is usually in the 80s which is not corresponding to what we saw on her cardiac monitor.  Heart rate during AF was typically white high on her monitor.    The patient is not medically physically active.  She has asthma for which she takes Singulair, Breo Ellipta and on occasion a rescue inhaler.  She also has hypertension, takes amlodipine.    She has occasional \"chest pressure\" unrelated to exertion and does not not worsen during exertion.  For example she has this sensation now.  This sometimes last for hours and does not radiate.    Family and social history: The patient lives alone.  She is a non-smoker and drinks alcohol socially.  Her father suffered a stroke in his early 60s.      PHYSICAL EXAMINATION:  Vital signs: 136/91, 71, 77.6 kg, BMI 31.3  General:  in no apparent distress  ENT/Mouth:  no nasal discharge.  Eyes:  normal " conjunctivae.   Neck:  no thyromegaly or lymphadenopathy.  Chest/Lungs:  patient is not dyspneic.  Lungs CTA, without rales or wheezing  Cardiovascular:  Nl JVP, rhythm is regular.  No gallop, murmur or rub.    Abdomen:  no abdominal distention.     Extremities:  no edema  Skin:  no xanthelasma.    Neurologic:  alert & oriented x 3.  No tremor.    Vascular:  2+ carotids without bruits.  2+ radials.        DIAGNOSTIC STUDIES:  Laboratory studies: Sodium 135, potassium 4.2, creatinine 0.83, TSH 1.49  ECG (5/7/2023): Sinus rhythm with PACs and low-grade sinus pauses.  Echocardiogram (05/2023): EF 54%, no significant valve abnormalities.      IMPRESSION:  Paroxysmal atrial fibrillation with RVR.  Exact burden is unknown.  The arrhythmia appears to be asymptomatic.  Nonetheless, treatment seems necessary because of associated RVR.  There was initial concern about bradycardia.  In fact, sinus bradycardia was well documented during her hospitalization.  On the other hand, significant bradycardia was not documented on her post discharge 2-week monitor.  [id number] score of 3.  She is tolerating rivaroxaban.      RECOMMENDATIONS:  Change amlodipine to diltiazem  mg daily (for rate control of AF).  In 1-2 weeks place 14-day leadless cardiac monitor to reassess AF burden.  We will arrange for further follow-up with EP as needed.    It was my pleasure seeing this delightful patient.  Please feel free to call with any questions.     Joao Hidalgo MD, [facility]        Orders Placed This Encounter   Medications    multivitamin w/minerals (MULTI-VITAMIN) tablet     Sig: Take 1 tablet by mouth daily       There are no discontinued medications.      Encounter Diagnosis   Name Primary?    Paroxysmal atrial fibrillation (H)        CURRENT MEDICATIONS:  Current Outpatient Medications   Medication Sig Dispense Refill    acetaminophen (TYLENOL) 325 MG tablet Take 325-650 mg by mouth every 6 hours as needed for mild pain       albuterol (PROAIR HFA/PROVENTIL HFA/VENTOLIN HFA) 108 (90 Base) MCG/ACT inhaler Inhale 2 puffs into the lungs every 4 hours as needed      amLODIPine (NORVASC) 5 MG tablet Take 1 tablet (5 mg) by mouth daily 30 tablet 0    cetirizine (ZYRTEC) 10 MG tablet Take 10 mg by mouth daily      diclofenac (VOLTAREN) 1 % topical gel Apply 2-4 g topically 3 times daily as needed      diphenhydramine-zinc acetate (BENADRYL ITCH RELIEF STICK) 2-0.1 % external stick Apply 1 g topically as needed      FLUoxetine (PROZAC) 10 MG capsule Take 10 mg by mouth daily With 20 mg capsule for total daily dose of 30 mg      FLUoxetine (PROZAC) 20 MG capsule Take 1 capsule by mouth daily With 10 mg capsule for total daily dose of 30 mg      fluticasone (FLONASE) 50 MCG/ACT nasal spray Spray 1 spray into both nostrils daily      fluticasone-vilanterol (BREO ELLIPTA) 200-25 MCG/INH inhaler Inhale 1 puff into the lungs daily      losartan (COZAAR) 100 MG tablet Take 100 mg by mouth daily      montelukast (SINGULAIR) 10 MG tablet Take 10 mg by mouth At Bedtime      multivitamin w/minerals (MULTI-VITAMIN) tablet Take 1 tablet by mouth daily      omeprazole (PRILOSEC) 20 MG DR capsule Take 2 capsules (40 mg) by mouth daily before breakfast      ondansetron (ZOFRAN ODT) 4 MG ODT tab Take 1 tablet (4 mg) by mouth every 8 hours as needed for nausea 10 tablet 0    pramipexole (MIRAPEX) 0.125 MG tablet Take 1 tablet by mouth At Bedtime      rivaroxaban ANTICOAGULANT (XARELTO) 20 MG TABS tablet Take 1 tablet (20 mg) by mouth daily (with dinner) 90 tablet 3    saline 0.65 % SOLN Spray 1 spray in nostril every 4 hours as needed      meloxicam (MOBIC) 15 MG tablet Take 1 tablet by mouth daily (Patient not taking: Reported on 5/18/2023)         ALLERGIES     Allergies   Allergen Reactions    Oxycodone Nausea and Vomiting     Violent vomiting.  Violent vomiting.      Seasonal Allergies      Other reaction(s): EENT - sneezing  Hay fever    Beta Adrenergic  Blockers Other (See Comments)     Contraindicated with immunotherapy    Lisinopril      Other reaction(s): Cough       PAST MEDICAL HISTORY:  Past Medical History:   Diagnosis Date    Allergic asthma, mild intermittent, with acute exacerbation 3/3/2012    Allergic rhinitis 10/7/2019    Formatting of this note might be different from the original. 10/7/2019 - Dr. Reddy A: Perennial and seasonal rhinitis, poorly controlled with Flonase, Allegra and Singulair. Allergy skin tests show strongest reactivity to cat dander, guinea pigs, ragweed; moderate reactivity to dog dander, dust mites, molds and tree pollens.   P -Reviewed with patient perennial and seasonal allergen avoidance and    Benign neoplasm of colon 12/30/2003    Bilateral carpal tunnel syndrome 5/7/2023    Candidiasis 10/19/2018    Essential hypertension, benign 10/31/2002    Gastroesophageal reflux disease with esophagitis 9/13/2001    History of total knee replacement 11/17/2014    Last Assessment & Plan:  Formatting of this note might be different from the original. 69yoF with h/o right TKA in Nov 2014 with new fall onto right knee 2-3 weeks ago with increased pain. Imaging negative for fracture or hardware failure, and pain is improving.   -Reassured TKA is in place without loosening or fractures noted  -Continue activities as tolerated  -Continue exercises for ROM, streng    Hypersomnia with sleep apnea 11/1/2007    Mild single current episode of major depressive disorder (H) 12/16/2017    Moderate persistent asthma without complication 6/12/2019    Formatting of this note might be different from the original. 10/7/2019 - Dr. Reddy  A: recurrent cough and wheeze not well controlled with Flovent 110. Allergy skin tests shows reactivity to multiple perennial and seasonal allergens that can exacerbate asthma control. Monitor for improvement with allergen desensitization.   P -Reviewed with patient the signs of asthma control include no cough or     Myopia  "5/1/2008    BETO (obstructive sleep apnea) 5/7/2023    Osteopenia determined by x-ray 6/8/2018    Presbyopia 5/1/2008    Recurrent major depressive disorder, in partial remission (H) 11/6/2020    Restless leg syndrome 2/10/2019    Sensorineural hearing loss (SNHL) of left ear with unrestricted hearing of right ear 9/21/2022    Spondylolisthesis at L5-S1 level 11/1/2017       PAST SURGICAL HISTORY:  Past Surgical History:   Procedure Laterality Date    ESOPHAGOSCOPY, GASTROSCOPY, DUODENOSCOPY (EGD), COMBINED N/A 5/8/2023    Procedure: Esophagoscopy, gastroscopy, duodenoscopy (EGD), combined;  Surgeon: Ethan Medina MD;  Location:  GI    TOTAL KNEE ARTHROPLASTY         FAMILY HISTORY:  Family History   Problem Relation Age of Onset    Hypertension Mother     Hypertension Father     Heart Failure Father     Hypertension Brother        SOCIAL HISTORY:  Social History     Socioeconomic History    Marital status:      Spouse name: None    Number of children: None    Years of education: None    Highest education level: None   Tobacco Use    Smoking status: Never    Smokeless tobacco: Never   Substance and Sexual Activity    Alcohol use: Yes     Comment: A few glasses of wine per week    Drug use: Never       Review of Systems:  Skin:          Eyes:         ENT:         Respiratory:          Cardiovascular:         Gastroenterology:        Genitourinary:         Musculoskeletal:         Neurologic:         Psychiatric:         Heme/Lymph/Imm:         Endocrine:           Physical Exam:  Vitals: BP (!) 136/91   Pulse 71   Ht 1.575 m (5' 2\")   Wt 77.6 kg (171 lb)   BMI 31.28 kg/m      Constitutional:           Skin:             Head:           Eyes:           Lymph:      ENT:           Neck:           Respiratory:            Cardiac:                                                           GI:           Extremities and Muscular Skeletal:                 Neurological:           Psych:     "       CC  Dorys Parnell MD  [address]  [place] [id number]

## 2023-08-01 NOTE — LETTER
"8/1/2023    Teressa Aquino, APRN CNP  [address] [facility] [address]  [facility] [place] [address]    RE: Jessica Ricketts       Dear Colleague,     I had the pleasure of seeing Jessica Ricketts in the [facility].  PHYSICIAN NOTE:  This visit was completed in person at the [facility].      I had the pleasure of seeing Ms. Jessica Ricketts for evaluation of atrial fibrillation.  She has been referred by Dr. Parnell.    Very pleasant 72-year-old female who was recently hospitalized at [facility] with nausea and dizziness.  She was noted to have asymptomatic bradycardia and ectopic atrial rhythm.  Echocardiogram was essentially normal.    She she was discharged home with a 2-week cardiac monitor which I reviewed today.  It showed episodes of paroxysmal AF with RVR, at least 2 separate events on 5/13 and 5/21/2023.  The exact AF burden is unclear given the type of monitor but it appears that one event may have lasted several hours.  There was no significant bradycardia.  Occasional episodes of \"dizziness\" correlated with sinus rhythm.    The patient was started on rivaroxaban by Dr. Parnell.  She has tolerated this well.  She remains largely unaware of episodes of AF though she states that her iwatch indicates that she has AF most days.  The heart rate indicated by her watch is usually in the 80s which is not corresponding to what we saw on her cardiac monitor.  Heart rate during AF was typically white high on her monitor.    The patient is not medically physically active.  She has asthma for which she takes Singulair, Breo Ellipta and on occasion a rescue inhaler.  She also has hypertension, takes amlodipine.    She has occasional \"chest pressure\" unrelated to exertion and does not not worsen during exertion.  For example she has this sensation now.  This sometimes last for hours and does not radiate.    Family and social history: The patient lives alone.  She is a non-smoker and drinks alcohol " socially.  Her father suffered a stroke in his early 60s.      PHYSICAL EXAMINATION:  Vital signs: 136/91, 71, 77.6 kg, BMI 31.3  General:  in no apparent distress  ENT/Mouth:  no nasal discharge.  Eyes:  normal conjunctivae.   Neck:  no thyromegaly or lymphadenopathy.  Chest/Lungs:  patient is not dyspneic.  Lungs CTA, without rales or wheezing  Cardiovascular:  Nl JVP, rhythm is regular.  No gallop, murmur or rub.    Abdomen:  no abdominal distention.     Extremities:  no edema  Skin:  no xanthelasma.    Neurologic:  alert & oriented x 3.  No tremor.    Vascular:  2+ carotids without bruits.  2+ radials.        DIAGNOSTIC STUDIES:  Laboratory studies: Sodium 135, potassium 4.2, creatinine 0.83, TSH 1.49  ECG (5/7/2023): Sinus rhythm with PACs and low-grade sinus pauses.  Echocardiogram (05/2023): EF 54%, no significant valve abnormalities.      IMPRESSION:  Paroxysmal atrial fibrillation with RVR.  Exact burden is unknown.  The arrhythmia appears to be asymptomatic.  Nonetheless, treatment seems necessary because of associated RVR.  There was initial concern about bradycardia.  In fact, sinus bradycardia was well documented during her hospitalization.  On the other hand, significant bradycardia was not documented on her post discharge 2-week monitor.  [id number] score of 3.  She is tolerating rivaroxaban.      RECOMMENDATIONS:  Change amlodipine to diltiazem  mg daily (for rate control of AF).  In 1-2 weeks place 14-day leadless cardiac monitor to reassess AF burden.  We will arrange for further follow-up with EP as needed.    It was my pleasure seeing this delightful patient.  Please feel free to call with any questions.     Joao Hidalgo MD, [facility]        Orders Placed This Encounter   Medications    multivitamin w/minerals (MULTI-VITAMIN) tablet     Sig: Take 1 tablet by mouth daily       There are no discontinued medications.      Encounter Diagnosis   Name Primary?    Paroxysmal atrial fibrillation  (H)        CURRENT MEDICATIONS:  Current Outpatient Medications   Medication Sig Dispense Refill    acetaminophen (TYLENOL) 325 MG tablet Take 325-650 mg by mouth every 6 hours as needed for mild pain      albuterol (PROAIR HFA/PROVENTIL HFA/VENTOLIN HFA) 108 (90 Base) MCG/ACT inhaler Inhale 2 puffs into the lungs every 4 hours as needed      amLODIPine (NORVASC) 5 MG tablet Take 1 tablet (5 mg) by mouth daily 30 tablet 0    cetirizine (ZYRTEC) 10 MG tablet Take 10 mg by mouth daily      diclofenac (VOLTAREN) 1 % topical gel Apply 2-4 g topically 3 times daily as needed      diphenhydramine-zinc acetate (BENADRYL ITCH RELIEF STICK) 2-0.1 % external stick Apply 1 g topically as needed      FLUoxetine (PROZAC) 10 MG capsule Take 10 mg by mouth daily With 20 mg capsule for total daily dose of 30 mg      FLUoxetine (PROZAC) 20 MG capsule Take 1 capsule by mouth daily With 10 mg capsule for total daily dose of 30 mg      fluticasone (FLONASE) 50 MCG/ACT nasal spray Spray 1 spray into both nostrils daily      fluticasone-vilanterol (BREO ELLIPTA) 200-25 MCG/INH inhaler Inhale 1 puff into the lungs daily      losartan (COZAAR) 100 MG tablet Take 100 mg by mouth daily      montelukast (SINGULAIR) 10 MG tablet Take 10 mg by mouth At Bedtime      multivitamin w/minerals (MULTI-VITAMIN) tablet Take 1 tablet by mouth daily      omeprazole (PRILOSEC) 20 MG DR capsule Take 2 capsules (40 mg) by mouth daily before breakfast      ondansetron (ZOFRAN ODT) 4 MG ODT tab Take 1 tablet (4 mg) by mouth every 8 hours as needed for nausea 10 tablet 0    pramipexole (MIRAPEX) 0.125 MG tablet Take 1 tablet by mouth At Bedtime      rivaroxaban ANTICOAGULANT (XARELTO) 20 MG TABS tablet Take 1 tablet (20 mg) by mouth daily (with dinner) 90 tablet 3    saline 0.65 % SOLN Spray 1 spray in nostril every 4 hours as needed      meloxicam (MOBIC) 15 MG tablet Take 1 tablet by mouth daily (Patient not taking: Reported on 5/18/2023)         ALLERGIES      Allergies   Allergen Reactions    Oxycodone Nausea and Vomiting     Violent vomiting.  Violent vomiting.      Seasonal Allergies      Other reaction(s): EENT - sneezing  Hay fever    Beta Adrenergic Blockers Other (See Comments)     Contraindicated with immunotherapy    Lisinopril      Other reaction(s): Cough       PAST MEDICAL HISTORY:  Past Medical History:   Diagnosis Date    Allergic asthma, mild intermittent, with acute exacerbation 3/3/2012    Allergic rhinitis 10/7/2019    Formatting of this note might be different from the original. 10/7/2019 - Dr. Reddy A: Perennial and seasonal rhinitis, poorly controlled with Flonase, Allegra and Singulair. Allergy skin tests show strongest reactivity to cat dander, guinea pigs, ragweed; moderate reactivity to dog dander, dust mites, molds and tree pollens.   P -Reviewed with patient perennial and seasonal allergen avoidance and    Benign neoplasm of colon 12/30/2003    Bilateral carpal tunnel syndrome 5/7/2023    Candidiasis 10/19/2018    Essential hypertension, benign 10/31/2002    Gastroesophageal reflux disease with esophagitis 9/13/2001    History of total knee replacement 11/17/2014    Last Assessment & Plan:  Formatting of this note might be different from the original. 69yoF with h/o right TKA in Nov 2014 with new fall onto right knee 2-3 weeks ago with increased pain. Imaging negative for fracture or hardware failure, and pain is improving.   -Reassured TKA is in place without loosening or fractures noted  -Continue activities as tolerated  -Continue exercises for ROM, streng    Hypersomnia with sleep apnea 11/1/2007    Mild single current episode of major depressive disorder (H) 12/16/2017    Moderate persistent asthma without complication 6/12/2019    Formatting of this note might be different from the original. 10/7/2019 - Dr. Reddy  A: recurrent cough and wheeze not well controlled with Flovent 110. Allergy skin tests shows reactivity to multiple perennial  "and seasonal allergens that can exacerbate asthma control. Monitor for improvement with allergen desensitization.   P -Reviewed with patient the signs of asthma control include no cough or     Myopia 5/1/2008    BETO (obstructive sleep apnea) 5/7/2023    Osteopenia determined by x-ray 6/8/2018    Presbyopia 5/1/2008    Recurrent major depressive disorder, in partial remission (H) 11/6/2020    Restless leg syndrome 2/10/2019    Sensorineural hearing loss (SNHL) of left ear with unrestricted hearing of right ear 9/21/2022    Spondylolisthesis at L5-S1 level 11/1/2017       PAST SURGICAL HISTORY:  Past Surgical History:   Procedure Laterality Date    ESOPHAGOSCOPY, GASTROSCOPY, DUODENOSCOPY (EGD), COMBINED N/A 5/8/2023    Procedure: Esophagoscopy, gastroscopy, duodenoscopy (EGD), combined;  Surgeon: Ethan Medina MD;  Location:  GI    TOTAL KNEE ARTHROPLASTY         FAMILY HISTORY:  Family History   Problem Relation Age of Onset    Hypertension Mother     Hypertension Father     Heart Failure Father     Hypertension Brother        SOCIAL HISTORY:  Social History     Socioeconomic History    Marital status:      Spouse name: None    Number of children: None    Years of education: None    Highest education level: None   Tobacco Use    Smoking status: Never    Smokeless tobacco: Never   Substance and Sexual Activity    Alcohol use: Yes     Comment: A few glasses of wine per week    Drug use: Never       Review of Systems:  Skin:          Eyes:         ENT:         Respiratory:          Cardiovascular:         Gastroenterology:        Genitourinary:         Musculoskeletal:         Neurologic:         Psychiatric:         Heme/Lymph/Imm:         Endocrine:           Physical Exam:  Vitals: BP (!) 136/91   Pulse 71   Ht 1.575 m (5' 2\")   Wt 77.6 kg (171 lb)   BMI 31.28 kg/m      Constitutional:           Skin:             Head:           Eyes:           Lymph:      ENT:           Neck:       "     Respiratory:            Cardiac:                                                           GI:           Extremities and Muscular Skeletal:                 Neurological:           Psych:           CC  Dorys Parnell MD  [address]  [place] [address]                  Thank you for allowing me to participate in the care of your patient.      Sincerely,     Joao Hidalgo MD     [facility]

## 2023-08-02 DIAGNOSIS — I48.0 PAROXYSMAL ATRIAL FIBRILLATION (H): ICD-10-CM

## 2023-08-02 RX ORDER — DILTIAZEM HYDROCHLORIDE 180 MG/1
180 CAPSULE, EXTENDED RELEASE ORAL DAILY
Qty: 90 CAPSULE | Refills: 3 | Status: SHIPPED | OUTPATIENT
Start: 2023-08-02

## 2023-08-10 ENCOUNTER — LAB (OUTPATIENT)
Dept: LAB | Facility: CLINIC | Age: 73
End: 2023-08-10
Payer: COMMERCIAL

## 2023-08-10 ENCOUNTER — OFFICE VISIT (OUTPATIENT)
Dept: CARDIOLOGY | Facility: CLINIC | Age: 73
End: 2023-08-10
Attending: INTERNAL MEDICINE
Payer: COMMERCIAL

## 2023-08-10 ENCOUNTER — HOSPITAL ENCOUNTER (OUTPATIENT)
Dept: CARDIOLOGY | Facility: CLINIC | Age: 73
Discharge: HOME | End: 2023-08-10
Attending: INTERNAL MEDICINE | Admitting: INTERNAL MEDICINE
Payer: COMMERCIAL

## 2023-08-10 VITALS
HEART RATE: 60 BPM | SYSTOLIC BLOOD PRESSURE: 108 MMHG | DIASTOLIC BLOOD PRESSURE: 70 MMHG | WEIGHT: 164.7 LBS | OXYGEN SATURATION: 99 % | BODY MASS INDEX: 30.31 KG/M2 | HEIGHT: 62 IN

## 2023-08-10 DIAGNOSIS — I48.0 PAROXYSMAL ATRIAL FIBRILLATION (H): ICD-10-CM

## 2023-08-10 LAB
ERYTHROCYTE [DISTWIDTH] IN BLOOD BY AUTOMATED COUNT: 14.4 % (ref 10–15)
HCT VFR BLD AUTO: 37.3 % (ref 35–47)
HGB BLD-MCNC: 12.3 G/DL (ref 11.7–15.7)
MCH RBC QN AUTO: 30.7 PG (ref 26.5–33)
MCHC RBC AUTO-ENTMCNC: 33 G/DL (ref 31.5–36.5)
MCV RBC AUTO: 93 FL (ref 78–100)
PLATELET # BLD AUTO: 319 10E3/UL (ref 150–450)
RBC # BLD AUTO: 4.01 10E6/UL (ref 3.8–5.2)
WBC # BLD AUTO: 4.9 10E3/UL (ref 4–11)

## 2023-08-10 PROCEDURE — 36415 COLL VENOUS BLD VENIPUNCTURE: CPT | Performed by: INTERNAL MEDICINE

## 2023-08-10 PROCEDURE — 99213 OFFICE O/P EST LOW 20 MIN: CPT | Performed by: INTERNAL MEDICINE

## 2023-08-10 PROCEDURE — 85027 COMPLETE CBC AUTOMATED: CPT | Performed by: INTERNAL MEDICINE

## 2023-08-10 PROCEDURE — 93246 EXT ECG>7D<15D RECORDING: CPT

## 2023-08-10 PROCEDURE — 93248 EXT ECG>7D<15D REV&INTERPJ: CPT | Performed by: INTERNAL MEDICINE

## 2023-08-10 RX ORDER — PANTOPRAZOLE SODIUM 40 MG/1
40 TABLET, DELAYED RELEASE ORAL
COMMUNITY
Start: 2023-05-15

## 2023-08-10 NOTE — PROGRESS NOTES
CARDIOLOGY CLINIC CONSULTATION    PRIMARY CARE PHYSICIAN:  Teressa Aquino    HISTORY OF PRESENT ILLNESS:  This a very pleasant 72-year-old female who was seen in 2023 during a hospital stay for nausea and vomiting.  Was incidentally noted to have paroxysmal atrial fibrillation.  She has paroxysmal disease on subsequent monitoring.  She has a [id number] or of 3 based on age female sex and hypertension.  Echocardiogram showed normal LV function.  She has been maintained on AV suzanne blocking agents with diltiazem and Xarelto.  Also saw EP recently.  Another monitor has been planned to assess rhythm and rate control.    Patient is asymptomatic from a cardiac standpoint.  No angina heart failure syncope presyncope.  Denies any bleeding problems.  Hemoglobin is stable.    PAST MEDICAL HISTORY:  Past Medical History:   Diagnosis Date    Allergic asthma, mild intermittent, with acute exacerbation 3/3/2012    Allergic rhinitis 10/7/2019    Formatting of this note might be different from the original. 10/7/2019 - Dr. Barry BOJORQUEZ: Perennial and seasonal rhinitis, poorly controlled with Flonase, Allegra and Singulair. Allergy skin tests show strongest reactivity to cat dander, guinea pigs, ragweed; moderate reactivity to dog dander, dust mites, molds and tree pollens.   P -Reviewed with patient perennial and seasonal allergen avoidance and    Benign neoplasm of colon 12/30/2003    Bilateral carpal tunnel syndrome 5/7/2023    Candidiasis 10/19/2018    Essential hypertension, benign 10/31/2002    Gastroesophageal reflux disease with esophagitis 9/13/2001    History of total knee replacement 11/17/2014    Last Assessment & Plan:  Formatting of this note might be different from the original. 69yoF with h/o right TKA in Nov 2014 with new fall onto right knee 2-3 weeks ago with increased pain. Imaging negative for fracture or hardware failure, and pain is improving.   -Reassured TKA is in place without loosening or fractures noted   -Continue activities as tolerated  -Continue exercises for ROM, streng    Hypersomnia with sleep apnea 11/1/2007    Mild single current episode of major depressive disorder (H) 12/16/2017    Moderate persistent asthma without complication 6/12/2019    Formatting of this note might be different from the original. 10/7/2019 - Dr. Reddy  A: recurrent cough and wheeze not well controlled with Flovent 110. Allergy skin tests shows reactivity to multiple perennial and seasonal allergens that can exacerbate asthma control. Monitor for improvement with allergen desensitization.   P -Reviewed with patient the signs of asthma control include no cough or     Myopia 5/1/2008    BETO (obstructive sleep apnea) 5/7/2023    Osteopenia determined by x-ray 6/8/2018    Presbyopia 5/1/2008    Recurrent major depressive disorder, in partial remission (H) 11/6/2020    Restless leg syndrome 2/10/2019    Sensorineural hearing loss (SNHL) of left ear with unrestricted hearing of right ear 9/21/2022    Spondylolisthesis at L5-S1 level 11/1/2017       MEDICATIONS:  Current Outpatient Medications   Medication    acetaminophen (TYLENOL) 325 MG tablet    albuterol (PROAIR HFA/PROVENTIL HFA/VENTOLIN HFA) 108 (90 Base) MCG/ACT inhaler    cetirizine (ZYRTEC) 10 MG tablet    diclofenac (VOLTAREN) 1 % topical gel    diltiazem ER (DILT-XR) 180 MG 24 hr capsule    diphenhydramine-zinc acetate (BENADRYL ITCH RELIEF STICK) 2-0.1 % external stick    FLUoxetine (PROZAC) 10 MG capsule    FLUoxetine (PROZAC) 20 MG capsule    fluticasone (FLONASE) 50 MCG/ACT nasal spray    fluticasone-vilanterol (BREO ELLIPTA) 200-25 MCG/INH inhaler    losartan (COZAAR) 100 MG tablet    montelukast (SINGULAIR) 10 MG tablet    multivitamin w/minerals (MULTI-VITAMIN) tablet    ondansetron (ZOFRAN ODT) 4 MG ODT tab    pantoprazole (PROTONIX) 40 MG EC tablet    pramipexole (MIRAPEX) 0.125 MG tablet    rivaroxaban ANTICOAGULANT (XARELTO) 20 MG TABS tablet    saline 0.65 % SOLN      No current facility-administered medications for this visit.       SOCIAL HISTORY:  I have reviewed this patient's social history and updated it with pertinent information if needed. Jessica Ricketts  reports that she has never smoked. She has never used smokeless tobacco. She reports current alcohol use. She reports that she does not use drugs.    PHYSICAL EXAM:  Pulse:  [60] 60  BP: (108)/(70) 108/70  SpO2:  [99 %] 99 %  164 lbs 11.2 oz    Constitutional: alert, no distress  Respiratory: Good bilateral air entry  Cardiovascular: Normal regular heart sounds  GI: nondistended  Neuropsychiatric: appropriate affact    ASSESSMENT: Pertinent issues addressed/ reviewed during this cardiology visit  Paroxysmal atrial fibrillation    RECOMMENDATIONS:  Patient denies any cardiovascular symptoms.  Appears to be in sinus rhythm today.  Continue diltiazem and Xarelto.  No active bleeding reported.  Hemoglobin stable.  Routine follow-up in the cardiology clinic in 12-month sooner if anything changes clinically.    It was a pleasure seeing this patient in clinic today. Please do not hesitate to contact me with any future questions.     LAWRENCE Mejias, [facility]  Cardiology - [facility]  August 10, 2023    This note was completed in part using dictation via the Dragon voice recognition software. Some word and grammatical errors may occur and must be interpreted in the appropriate clinical context.  If there are any questions pertaining to this issue, please contact me for further clarification.

## 2023-08-10 NOTE — LETTER
8/10/2023    Teressa Aquino, APRN CNP  [address] [facility] [address]  [facility] [address]    RE: Jessica Ricketts       Dear Colleague,     I had the pleasure of seeing Jessica Ricketts in the [facility].  CARDIOLOGY CLINIC CONSULTATION    PRIMARY CARE PHYSICIAN:  Teressa Aquino    HISTORY OF PRESENT ILLNESS:  This a very pleasant 72-year-old female who was seen in 2023 during a hospital stay for nausea and vomiting.  Was incidentally noted to have paroxysmal atrial fibrillation.  She has paroxysmal disease on subsequent monitoring.  She has a [id number] or of 3 based on age female sex and hypertension.  Echocardiogram showed normal LV function.  She has been maintained on AV suzanne blocking agents with diltiazem and Xarelto.  Also saw EP recently.  Another monitor has been planned to assess rhythm and rate control.    Patient is asymptomatic from a cardiac standpoint.  No angina heart failure syncope presyncope.  Denies any bleeding problems.  Hemoglobin is stable.    PAST MEDICAL HISTORY:  Past Medical History:   Diagnosis Date    Allergic asthma, mild intermittent, with acute exacerbation 3/3/2012    Allergic rhinitis 10/7/2019    Formatting of this note might be different from the original. 10/7/2019 - Dr. Barry BOJORQUEZ: Perennial and seasonal rhinitis, poorly controlled with Flonase, Allegra and Singulair. Allergy skin tests show strongest reactivity to cat dander, guinea pigs, ragweed; moderate reactivity to dog dander, dust mites, molds and tree pollens.   P -Reviewed with patient perennial and seasonal allergen avoidance and    Benign neoplasm of colon 12/30/2003    Bilateral carpal tunnel syndrome 5/7/2023    Candidiasis 10/19/2018    Essential hypertension, benign 10/31/2002    Gastroesophageal reflux disease with esophagitis 9/13/2001    History of total knee replacement 11/17/2014    Last Assessment & Plan:  Formatting of this note might be different from the original. 69yoF with h/o right TKA in  Nov 2014 with new fall onto right knee 2-3 weeks ago with increased pain. Imaging negative for fracture or hardware failure, and pain is improving.   -Reassured TKA is in place without loosening or fractures noted  -Continue activities as tolerated  -Continue exercises for ROM, streng    Hypersomnia with sleep apnea 11/1/2007    Mild single current episode of major depressive disorder (H) 12/16/2017    Moderate persistent asthma without complication 6/12/2019    Formatting of this note might be different from the original. 10/7/2019 - Dr. Reddy  A: recurrent cough and wheeze not well controlled with Flovent 110. Allergy skin tests shows reactivity to multiple perennial and seasonal allergens that can exacerbate asthma control. Monitor for improvement with allergen desensitization.   P -Reviewed with patient the signs of asthma control include no cough or     Myopia 5/1/2008    BETO (obstructive sleep apnea) 5/7/2023    Osteopenia determined by x-ray 6/8/2018    Presbyopia 5/1/2008    Recurrent major depressive disorder, in partial remission (H) 11/6/2020    Restless leg syndrome 2/10/2019    Sensorineural hearing loss (SNHL) of left ear with unrestricted hearing of right ear 9/21/2022    Spondylolisthesis at L5-S1 level 11/1/2017       MEDICATIONS:  Current Outpatient Medications   Medication    acetaminophen (TYLENOL) 325 MG tablet    albuterol (PROAIR HFA/PROVENTIL HFA/VENTOLIN HFA) 108 (90 Base) MCG/ACT inhaler    cetirizine (ZYRTEC) 10 MG tablet    diclofenac (VOLTAREN) 1 % topical gel    diltiazem ER (DILT-XR) 180 MG 24 hr capsule    diphenhydramine-zinc acetate (BENADRYL ITCH RELIEF STICK) 2-0.1 % external stick    FLUoxetine (PROZAC) 10 MG capsule    FLUoxetine (PROZAC) 20 MG capsule    fluticasone (FLONASE) 50 MCG/ACT nasal spray    fluticasone-vilanterol (BREO ELLIPTA) 200-25 MCG/INH inhaler    losartan (COZAAR) 100 MG tablet    montelukast (SINGULAIR) 10 MG tablet    multivitamin w/minerals (MULTI-VITAMIN)  tablet    ondansetron (ZOFRAN ODT) 4 MG ODT tab    pantoprazole (PROTONIX) 40 MG EC tablet    pramipexole (MIRAPEX) 0.125 MG tablet    rivaroxaban ANTICOAGULANT (XARELTO) 20 MG TABS tablet    saline 0.65 % SOLN     No current facility-administered medications for this visit.       SOCIAL HISTORY:  I have reviewed this patient's social history and updated it with pertinent information if needed. Jessica Ricketts  reports that she has never smoked. She has never used smokeless tobacco. She reports current alcohol use. She reports that she does not use drugs.    PHYSICAL EXAM:  Pulse:  [60] 60  BP: (108)/(70) 108/70  SpO2:  [99 %] 99 %  164 lbs 11.2 oz    Constitutional: alert, no distress  Respiratory: Good bilateral air entry  Cardiovascular: Normal regular heart sounds  GI: nondistended  Neuropsychiatric: appropriate affact    ASSESSMENT: Pertinent issues addressed/ reviewed during this cardiology visit  Paroxysmal atrial fibrillation    RECOMMENDATIONS:  Patient denies any cardiovascular symptoms.  Appears to be in sinus rhythm today.  Continue diltiazem and Xarelto.  No active bleeding reported.  Hemoglobin stable.  Routine follow-up in the cardiology clinic in 12-month sooner if anything changes clinically.    It was a pleasure seeing this patient in clinic today. Please do not hesitate to contact me with any future questions.     LAWRENCE Mejias, [facility]  Cardiology - [facility]  August 10, 2023    This note was completed in part using dictation via the Dragon voice recognition software. Some word and grammatical errors may occur and must be interpreted in the appropriate clinical context.  If there are any questions pertaining to this issue, please contact me for further clarification.    Thank you for allowing me to participate in the care of your patient.      Sincerely,     Dorys Parnell MD     [facility]  cc:   Dorys Parnell MD  [address]  [address]  [place] [id number]

## 2023-08-24 ENCOUNTER — TELEPHONE (OUTPATIENT)
Dept: CARDIOLOGY | Facility: CLINIC | Age: 73
End: 2023-08-24
Payer: COMMERCIAL

## 2023-08-24 NOTE — TELEPHONE ENCOUNTER
Pt was worried because she had forgo to do the diary. Told pt that that was fine. Pt states that she did not feel anything while wearing he monitor, but her watch at times would display rate. Pt will send the monitor in as is. Juvenal

## 2023-08-24 NOTE — TELEPHONE ENCOUNTER
[facility] Call Center    Phone Message    May a detailed message be left on voicemail: yes     Reason for Call: Other: Pt wants to speak with team about ziopatch monitor, please reach out to further discuss.     Action Taken: Other: Cardiology    Travel Screening: Not Applicable     Thank you!  [facility]

## 2023-09-05 ENCOUNTER — APPOINTMENT (OUTPATIENT)
Dept: GENERAL RADIOLOGY | Facility: CLINIC | Age: 73
End: 2023-09-05
Attending: EMERGENCY MEDICINE
Payer: COMMERCIAL

## 2023-09-05 ENCOUNTER — OFFICE VISIT (OUTPATIENT)
Dept: URGENT CARE | Facility: URGENT CARE | Age: 73
End: 2023-09-05
Payer: COMMERCIAL

## 2023-09-05 ENCOUNTER — HOSPITAL ENCOUNTER (EMERGENCY)
Facility: CLINIC | Age: 73
Discharge: HOME | End: 2023-09-05
Attending: EMERGENCY MEDICINE | Admitting: EMERGENCY MEDICINE
Payer: COMMERCIAL

## 2023-09-05 VITALS
TEMPERATURE: 97.4 F | DIASTOLIC BLOOD PRESSURE: 88 MMHG | SYSTOLIC BLOOD PRESSURE: 140 MMHG | HEART RATE: 58 BPM | OXYGEN SATURATION: 95 %

## 2023-09-05 VITALS
SYSTOLIC BLOOD PRESSURE: 121 MMHG | WEIGHT: 165 LBS | HEART RATE: 64 BPM | DIASTOLIC BLOOD PRESSURE: 80 MMHG | BODY MASS INDEX: 30.18 KG/M2 | OXYGEN SATURATION: 97 % | TEMPERATURE: 97.9 F | RESPIRATION RATE: 16 BRPM

## 2023-09-05 DIAGNOSIS — R06.00 DYSPNEA, UNSPECIFIED TYPE: ICD-10-CM

## 2023-09-05 DIAGNOSIS — R06.02 SHORTNESS OF BREATH: ICD-10-CM

## 2023-09-05 DIAGNOSIS — I48.0 PAROXYSMAL ATRIAL FIBRILLATION (H): ICD-10-CM

## 2023-09-05 DIAGNOSIS — R07.9 CHEST PAIN, UNSPECIFIED TYPE: Primary | ICD-10-CM

## 2023-09-05 DIAGNOSIS — R07.89 CHEST WALL PAIN: ICD-10-CM

## 2023-09-05 LAB
ANION GAP SERPL CALCULATED.3IONS-SCNC: 11 MMOL/L (ref 7–15)
ATRIAL RATE - MUSE: 59 BPM
BASOPHILS # BLD AUTO: 0 10E3/UL (ref 0–0.2)
BASOPHILS NFR BLD AUTO: 1 %
BUN SERPL-MCNC: 18 MG/DL (ref 8–23)
CALCIUM SERPL-MCNC: 9.1 MG/DL (ref 8.8–10.2)
CHLORIDE SERPL-SCNC: 102 MMOL/L (ref 98–107)
CREAT SERPL-MCNC: 0.77 MG/DL (ref 0.51–0.95)
DEPRECATED HCO3 PLAS-SCNC: 24 MMOL/L (ref 22–29)
DIASTOLIC BLOOD PRESSURE - MUSE: NORMAL MMHG
EOSINOPHIL # BLD AUTO: 0.1 10E3/UL (ref 0–0.7)
EOSINOPHIL NFR BLD AUTO: 2 %
ERYTHROCYTE [DISTWIDTH] IN BLOOD BY AUTOMATED COUNT: 13.9 % (ref 10–15)
GFR SERPL CREATININE-BSD FRML MDRD: 83 ML/MIN/1.73M2
GLUCOSE SERPL-MCNC: 90 MG/DL (ref 70–99)
HCT VFR BLD AUTO: 37.7 % (ref 35–47)
HGB BLD-MCNC: 12.1 G/DL (ref 11.7–15.7)
IMM GRANULOCYTES # BLD: 0 10E3/UL
IMM GRANULOCYTES NFR BLD: 0 %
INTERPRETATION ECG - MUSE: NORMAL
LYMPHOCYTES # BLD AUTO: 1.7 10E3/UL (ref 0.8–5.3)
LYMPHOCYTES NFR BLD AUTO: 30 %
MAGNESIUM SERPL-MCNC: 1.8 MG/DL (ref 1.7–2.3)
MCH RBC QN AUTO: 29.7 PG (ref 26.5–33)
MCHC RBC AUTO-ENTMCNC: 32.1 G/DL (ref 31.5–36.5)
MCV RBC AUTO: 93 FL (ref 78–100)
MONOCYTES # BLD AUTO: 0.4 10E3/UL (ref 0–1.3)
MONOCYTES NFR BLD AUTO: 7 %
NEUTROPHILS # BLD AUTO: 3.5 10E3/UL (ref 1.6–8.3)
NEUTROPHILS NFR BLD AUTO: 60 %
NRBC # BLD AUTO: 0 10E3/UL
NRBC BLD AUTO-RTO: 0 /100
NT-PROBNP SERPL-MCNC: 610 PG/ML (ref 0–900)
P AXIS - MUSE: NORMAL DEGREES
PLATELET # BLD AUTO: 315 10E3/UL (ref 150–450)
POTASSIUM SERPL-SCNC: 4.2 MMOL/L (ref 3.4–5.3)
PR INTERVAL - MUSE: 232 MS
QRS DURATION - MUSE: 78 MS
QT - MUSE: 418 MS
QTC - MUSE: 413 MS
R AXIS - MUSE: 34 DEGREES
RBC # BLD AUTO: 4.07 10E6/UL (ref 3.8–5.2)
SODIUM SERPL-SCNC: 137 MMOL/L (ref 136–145)
SYSTOLIC BLOOD PRESSURE - MUSE: NORMAL MMHG
T AXIS - MUSE: 52 DEGREES
TROPONIN T SERPL HS-MCNC: 12 NG/L
TSH SERPL DL<=0.005 MIU/L-ACNC: 1.5 UIU/ML (ref 0.3–4.2)
VENTRICULAR RATE- MUSE: 59 BPM
WBC # BLD AUTO: 5.8 10E3/UL (ref 4–11)

## 2023-09-05 PROCEDURE — 80048 BASIC METABOLIC PNL TOTAL CA: CPT | Performed by: EMERGENCY MEDICINE

## 2023-09-05 PROCEDURE — 85025 COMPLETE CBC W/AUTO DIFF WBC: CPT | Performed by: EMERGENCY MEDICINE

## 2023-09-05 PROCEDURE — 99285 EMERGENCY DEPT VISIT HI MDM: CPT | Mod: 25

## 2023-09-05 PROCEDURE — 83880 ASSAY OF NATRIURETIC PEPTIDE: CPT | Performed by: EMERGENCY MEDICINE

## 2023-09-05 PROCEDURE — 71046 X-RAY EXAM CHEST 2 VIEWS: CPT

## 2023-09-05 PROCEDURE — 93000 ELECTROCARDIOGRAM COMPLETE: CPT

## 2023-09-05 PROCEDURE — 73030 X-RAY EXAM OF SHOULDER: CPT | Mod: LT

## 2023-09-05 PROCEDURE — 84484 ASSAY OF TROPONIN QUANT: CPT | Performed by: EMERGENCY MEDICINE

## 2023-09-05 PROCEDURE — 83735 ASSAY OF MAGNESIUM: CPT | Performed by: EMERGENCY MEDICINE

## 2023-09-05 PROCEDURE — 93005 ELECTROCARDIOGRAM TRACING: CPT

## 2023-09-05 PROCEDURE — 99214 OFFICE O/P EST MOD 30 MIN: CPT

## 2023-09-05 PROCEDURE — 84443 ASSAY THYROID STIM HORMONE: CPT | Performed by: EMERGENCY MEDICINE

## 2023-09-05 PROCEDURE — 36415 COLL VENOUS BLD VENIPUNCTURE: CPT | Performed by: EMERGENCY MEDICINE

## 2023-09-05 ASSESSMENT — ACTIVITIES OF DAILY LIVING (ADL)
ADLS_ACUITY_SCORE: 35

## 2023-09-05 NOTE — ED NOTES
Tele-PIT/Intake Evaluation      Video-Visit Details    Type of service:  Video Visit    Video Start Time (time video started): 1:06 PM  Video End Time (time video stopped): 1:10 PM   Originating Location (pt. Location):  [facility]  Distant Location (provider location):  [facility]  Mode of Communication:  Video Conference via [employer]  Patient verbally consented to [employer].    History:  72-year-old female with a history of atrial fibrillation on long-term anticoagulation who presents with shortness of breath and left-sided chest pain that developed over the weekend.  She notes its been persistent through the weekend which is why she presents here today.  There is tenderness when she places a shoulder strap or purse strap over the left shoulder.  She notes some radiation of the discomfort to the left upper extremity and also into the left shoulder blade.  She notes that it is tender when she presses on the front of the chest wall.  She denies needing any additional pain medication at this time.  She is been taking her medications regularly.  She denies any sore throat, fever, or infectious symptoms.  No known sick contacts.  Denies abdominal pain.  Denies personal history of heart attack or heart disease    Exam:  Patient Vitals for the past 24 hrs:   Temp Temp src Weight   09/05/23 1306 97.9  F (36.6  C) Temporal 74.8 kg (165 lb)   General:  Alert, interactive  Cardiovascular:  Well perfused  Lungs:  No respiratory distress, no accessory muscle use  Neuro:  Moving all 4 extremities  Skin:  Warm, dry  Psych:  Normal affect      Appropriate interventions for symptom management were initiated if applicable.  Appropriate diagnostic tests were initiated if indicated.    Important information for subsequent clinician:  72-year-old female with a history of atrial fibrillation on anticoagulation who presents with left-sided chest pain and shortness of breath for several days.  EKG,  laboratory work and chest x-ray pending at this time.  Lower suspicion for pulmonary embolism given long-term anticoagulation use    I briefly evaluated the patient and developed an initial plan of care. I discussed this plan and explained that this brief interaction does not constitute a full evaluation. Patient/family understands that they should wait to be fully evaluated and discuss any test results with another clinician prior to leaving the hospital.       Lucio Smith MD  09/05/23 [id number]

## 2023-09-05 NOTE — PROGRESS NOTES
"Assessment & Plan     Diagnosis:    ICD-10-CM    1. Chest pain, unspecified type  R07.9       2. Dyspnea, unspecified type  R06.00 EKG 12-lead complete w/read - Clinics      3. Paroxysmal atrial fibrillation (H)  I48.0 EKG 12-lead complete w/read - Clinics          Medical Decision Making:  Jessica Ricketts is a 72 year old female who presents with chest pain described as \"pressure\" and mild dyspnea on exertion. Patient does have history of paroxsymal atrial fibrillation; is currently on Diltiazem and Xarelto. I considered a broad differential diagnosis in this patient including life-threatening etiologies such as acute coronary syndrome, myocardial infarction, pulmonary embolism, acute aortic dissection, myocarditis, pericarditis, acute valvular insufficiency amongst others.  Other causes considered for this patient included pneumonia, pneumothorax, chest wall source, pericarditis, pleurisy, esophageal spasm, etc. patient's EKG does not show acute STEMI, it does show she is in atrial fibrillation.  She is not chronically in atrial fibrillation, discussed that her symptoms may be secondary to her being it currently and she is on appropriate medications, but given her age and other risk factors also concerning for an underlying etiologies as noted above; therefore following shared decision making the patient was directed to go to the ER now for further evaluation.  Patient voices understanding and agreement with the plan.  Questions answered.      Cholo Rodgers PA-C  [facility]    Subjective     Jessica Ricketts is a 72 year old female who presents to clinic today for the following health issues:  Chief Complaint   Patient presents with    Chest Pain     Pt reports 'doing fun stuff at cabin over past weekend and is feeling chest pain'        HPI  Patient states that she was active and playing games and doing other \"fun stuff\" up at her cabin over the past weekend, started feeling some chest pressure. "  She states that this has been getting intermittently worse at times, is more short of breath with exertion.  She also has a history of asthma, but has not been wheezing, has had no improvement with her inhalers.  She does note that she has a history of atrial fibrillation, not constantly.  She is on Xarelto and diltiazem and takes these medications regularly.  She did have some right ankle swelling over the weekend, but this has subsided.  She denies any calf pain, history of DVT/PE, lightheadedness, dizziness, fainting episodes, abdominal pain or other concerns.    Review of Systems    See HPI    Objective      Vitals: BP (!) 140/88   Pulse 58   Temp 97.4  F (36.3  C) (Temporal)   SpO2 95%   Resp: 16    Patient Vitals for the past 24 hrs:   BP Temp Temp src Pulse SpO2   09/05/23 1223 (!) 140/88 97.4  F (36.3  C) Temporal 58 95 %       Vital signs reviewed by: Cholo Rodgers PA-C    Physical Exam   Constitutional: Patient is alert and cooperative. No acute distress.  Cardiovascular: Irregularly irregular rate and rhythm. No murmurs or gallops.   Pulmonary/Chest: Lungs are clear to auscultation throughout. Effort normal. No respiratory distress. No wheezes, rales or rhonchi.  GI: Abdomen is soft and non-tender throughout.   Neurological: Alert and oriented x3.   MSK: No lower extremity edema bilaterally.  Skin: No rash noted on visualized skin.  Psychiatric:The patient has a normal mood and affect.       EKG - Reviewed and interpreted by: Cholo Rodgers PA-C  Rate: 68 bpm   KY: - ms  QTc: 416 ms  Atrial fibrillation. No acute ST/T changes c/w ischemia, no LVH by voltage criteria.         Cholo Rodgers PA-C, September 5, 2023

## 2023-09-05 NOTE — ED PROVIDER NOTES
History     Chief Complaint:  Chest Pain       HPI   Jessica Ricketts is a 72 year old female who presents for evaluation of chest pain and shortness of breath.  Symptoms began a few days ago about Thursday or Friday of last week.  They have been constant pain.  Is located in her left chest wall into her left shoulder and into her left upper back.  Pain is worse when she puts up her strap on it or touches it.  No pleuritic component to the pain.  Does not get diaphoretic, nauseous, or have lightheadedness or dizziness.  She has pain when she moves her left arm as well.  No numbness or weakness of the left arm.  No fevers or chills.  No cough or shortness of breath.  She has a history of atrial fibrillation and is anticoagulated.  She has not missed a dose of anticoagulation.  She states that she looks at her watch every day to check the heart rhythm and about 25% of the time, notifies her that she is in atrial fibrillation.  She does not feel lightheaded or dizziness during these episodes.  She does not have a history of coronary disease.  No family history of coronary disease.  She has a history of hypertension.  She does not smoke or have diabetes.  No lower extremity pain or swelling.      Independent Historian:   None - Patient Only    Review of External Notes:   Cardiology note Dr. Parnell dated 8/10/2023 where they continued diltiazem and Xarelto and plan on no Zio patch to assess A-fib burden.  Zio patch was worn, however, results still pending.      Medications:    acetaminophen (TYLENOL) 325 MG tablet  albuterol (PROAIR HFA/PROVENTIL HFA/VENTOLIN HFA) 108 (90 Base) MCG/ACT inhaler  cetirizine (ZYRTEC) 10 MG tablet  diclofenac (VOLTAREN) 1 % topical gel  diltiazem ER (DILT-XR) 180 MG 24 hr capsule  diphenhydramine-zinc acetate (BENADRYL ITCH RELIEF STICK) 2-0.1 % external stick  FLUoxetine (PROZAC) 10 MG capsule  FLUoxetine (PROZAC) 20 MG capsule  fluticasone (FLONASE) 50 MCG/ACT nasal  spray  fluticasone-vilanterol (BREO ELLIPTA) 200-25 MCG/INH inhaler  losartan (COZAAR) 100 MG tablet  montelukast (SINGULAIR) 10 MG tablet  multivitamin w/minerals (MULTI-VITAMIN) tablet  ondansetron (ZOFRAN ODT) 4 MG ODT tab  pantoprazole (PROTONIX) 40 MG EC tablet  pramipexole (MIRAPEX) 0.125 MG tablet  rivaroxaban ANTICOAGULANT (XARELTO) 20 MG TABS tablet  saline 0.65 % SOLN        Past Medical History:    Past Medical History:   Diagnosis Date    Allergic asthma, mild intermittent, with acute exacerbation 3/3/2012    Allergic rhinitis 10/7/2019    Benign neoplasm of colon 12/30/2003    Bilateral carpal tunnel syndrome 5/7/2023    Candidiasis 10/19/2018    Essential hypertension, benign 10/31/2002    Gastroesophageal reflux disease with esophagitis 9/13/2001    History of total knee replacement 11/17/2014    Hypersomnia with sleep apnea 11/1/2007    Mild single current episode of major depressive disorder (H) 12/16/2017    Moderate persistent asthma without complication 6/12/2019    Myopia 5/1/2008    BETO (obstructive sleep apnea) 5/7/2023    Osteopenia determined by x-ray 6/8/2018    Presbyopia 5/1/2008    Recurrent major depressive disorder, in partial remission (H) 11/6/2020    Restless leg syndrome 2/10/2019    Sensorineural hearing loss (SNHL) of left ear with unrestricted hearing of right ear 9/21/2022    Spondylolisthesis at L5-S1 level 11/1/2017       Past Surgical History:    Past Surgical History:   Procedure Laterality Date    ESOPHAGOSCOPY, GASTROSCOPY, DUODENOSCOPY (EGD), COMBINED N/A 5/8/2023    Procedure: Esophagoscopy, gastroscopy, duodenoscopy (EGD), combined;  Surgeon: Ethan Medina MD;  Location:  GI    TOTAL KNEE ARTHROPLASTY          Physical Exam   Patient Vitals for the past 24 hrs:   BP Temp Temp src Pulse Resp SpO2 Weight   09/05/23 1707 121/80 -- -- -- -- 97 % --   09/05/23 1637 (!) 137/110 -- -- -- -- 94 % --   09/05/23 1458 (!) 140/110 -- -- 64 16 97 % --   09/05/23 1446 (!)  150/99 -- -- 56 26 99 % --   09/05/23 1308 -- -- -- -- 18 -- --   09/05/23 1307 (!) 147/89 -- -- 62 -- 98 % --   09/05/23 1306 -- 97.9  F (36.6  C) Temporal -- -- -- 74.8 kg (165 lb)        Physical Exam  Constitutional: Well appearing.  HEENT: Atraumatic.   Moist mucous membranes.  Neck: Soft.  Supple.  No JVD.  Cardiac: Regular rate and rhythm.  No murmur or rub.  Respiratory: Clear to auscultation bilaterally.  No respiratory distress.  No wheezing, rhonchi, or rales.  Abdomen: Soft and nontender.  No rebound or guarding.  Nondistended.  Musculoskeletal: Reproducible tenderness of the left upper chest wall, left shoulder, and left upper back without visible lesion or swelling. No effusion, erythema, or warmth of the left shoulder. Radial pulse 2+ and capillary refill less than 3 seconds left arm and hand. No edema.  Normal range of motion.  Neurologic: Alert and oriented x3.  Normal tone and bulk.  5/5 strength in the right upper extremity.  Sensation to light touch intact throughout.  Normal gait.  Skin: No rashes.  No edema.  Psych: Normal affect.  Normal behavior.              Emergency Department Course     ECG results from 09/05/23   EKG 12-lead, tracing only     Value    Systolic Blood Pressure     Diastolic Blood Pressure     Ventricular Rate 59    Atrial Rate 59    VA Interval 232    QRS Duration 78        QTc 413    P Axis     R AXIS 34    T Axis 52    Interpretation ECG      Sinus bradycardia with marked sinus arrhythmia with 1st degree A-V block with Premature atrial complexes  Otherwise normal ECG  When compared with ECG of 08-MAY-2023 04:20,  No significant change was found  Confirmed by GENERATED REPORT, COMPUTER ([id number]),  Lanette Portillo ([id number]) on 9/5/2023 2:57:16 PM           Imaging:  XR Shoulder Left G/E 3 Views   Final Result   IMPRESSION: Normal alignment. No fracture. Mild degenerative arthrosis   AC joint.      VIKI CINTRON MD            SYSTEM ID:  [id number]      XR Chest 2  Views   Final Result   IMPRESSION: Negative chest. Lungs clear.      STEPHEN GUARDADO MD            SYSTEM ID:  [id number]         Report per radiology    Laboratory:  Labs Ordered and Resulted from Time of ED Arrival to Time of ED Departure   BASIC METABOLIC PANEL - Normal       Result Value    Sodium 137      Potassium 4.2      Chloride 102      Carbon Dioxide (CO2) 24      Anion Gap 11      Urea Nitrogen 18.0      Creatinine 0.77      Calcium 9.1      Glucose 90      GFR Estimate 83     TROPONIN T, HIGH SENSITIVITY - Normal    Troponin T, High Sensitivity 12     TSH WITH FREE T4 REFLEX - Normal    TSH 1.50     MAGNESIUM - Normal    Magnesium 1.8     NT PROBNP INPATIENT - Normal    N terminal Pro BNP Inpatient 610     CBC WITH PLATELETS AND DIFFERENTIAL    WBC Count 5.8      RBC Count 4.07      Hemoglobin 12.1      Hematocrit 37.7      MCV 93      MCH 29.7      MCHC 32.1      RDW 13.9      Platelet Count 315      % Neutrophils 60      % Lymphocytes 30      % Monocytes 7      % Eosinophils 2      % Basophils 1      % Immature Granulocytes 0      NRBCs per 100 WBC 0      Absolute Neutrophils 3.5      Absolute Lymphocytes 1.7      Absolute Monocytes 0.4      Absolute Eosinophils 0.1      Absolute Basophils 0.0      Absolute Immature Granulocytes 0.0      Absolute NRBCs 0.0          Procedures       Emergency Department Course & Assessments:             Interventions:  Medications - No data to display     Assessments:      Independent Interpretation (X-rays, CTs, rhythm strip):  Chest x-ray without acute infiltrate or pneumothorax  Left shoulder x-ray without acute osseous abnormality and left AC joint degeneration I discussed with the patient.    Consultations/Discussion of Management or Tests:  None        Social Determinants of Health affecting care:   None    Disposition:  The patient was discharged to home.     Impression & Plan      Medical Decision Making:  Jessica Ricketts is a 72-year-old woman who is afebrile  and hemodynamically stable. Her EKG demonstrates a sinus rhythm first-degree AV block and premature atrial contractions without acute ischemic changes.  Troponin is within normal limits.  Pain has been constant for greater than 6 hours and actually present for a few days and very atypical and very reproducible.  ACS is excluded.  She has a heart score of 3  I think is safe for discharge home with outpatient work-up.  She has no pleuritic component, tachycardia, or hypoxia to suggest a PE and has not missed any doses of her anticoagulation and I very much doubt a PE at this time.  X-ray of the chest and left shoulder demonstrate no acute abnormalities.  X-ray of the left shoulder without acute she is neurovascular intact in the left arm.  Pain is very reproducible to touch.  There is no effusion or warmth of the left shoulder.  No evidence of septic joint or ischemic limb.  She is reassured by the findings and I discussed plan for follow-up with cardiology for report of her Holter monitor.  She declined an outpatient stress test but I recommended she follow-up with a cardiologist and her primary care physician.  She is in agreement with plan and feels comfortable discharging home.    Diagnosis:    ICD-10-CM    1. Chest wall pain  R07.89       2. Shortness of breath  R06.02            Discharge Medications:  New Prescriptions    No medications on file        9/5/2023   Kavon Campa MD Salay, Nicholas J, MD  09/06/23 0914

## 2023-09-06 ENCOUNTER — TELEPHONE (OUTPATIENT)
Dept: CARDIOLOGY | Facility: CLINIC | Age: 73
End: 2023-09-06
Payer: COMMERCIAL

## 2023-09-06 DIAGNOSIS — I48.0 PAROXYSMAL ATRIAL FIBRILLATION (H): Primary | ICD-10-CM

## 2024-03-14 ENCOUNTER — ANCILLARY PROCEDURE (OUTPATIENT)
Dept: MRI IMAGING | Facility: CLINIC | Age: 74
End: 2024-03-14
Attending: NURSE PRACTITIONER
Payer: COMMERCIAL

## 2024-03-14 DIAGNOSIS — R47.01 EXPRESSIVE APHASIA: ICD-10-CM

## 2024-03-14 PROCEDURE — 255N000002 HC RX 255 OP 636: Performed by: RADIOLOGY

## 2024-03-14 PROCEDURE — 70553 MRI BRAIN STEM W/O & W/DYE: CPT

## 2024-03-14 PROCEDURE — A9585 GADOBUTROL INJECTION: HCPCS | Performed by: RADIOLOGY

## 2024-03-14 RX ORDER — GADOBUTROL 604.72 MG/ML
7.5 INJECTION INTRAVENOUS ONCE
Status: COMPLETED | OUTPATIENT
Start: 2024-03-14 | End: 2024-03-14

## 2024-03-14 RX ADMIN — GADOBUTROL 7.5 ML: 604.72 INJECTION INTRAVENOUS at 07:07

## 2024-04-23 ENCOUNTER — OFFICE VISIT (OUTPATIENT)
Dept: CARDIOLOGY | Facility: CLINIC | Age: 74
End: 2024-04-23
Attending: INTERNAL MEDICINE
Payer: COMMERCIAL

## 2024-04-23 VITALS
WEIGHT: 172 LBS | HEART RATE: 65 BPM | OXYGEN SATURATION: 97 % | DIASTOLIC BLOOD PRESSURE: 85 MMHG | HEIGHT: 62 IN | SYSTOLIC BLOOD PRESSURE: 121 MMHG | BODY MASS INDEX: 31.65 KG/M2

## 2024-04-23 DIAGNOSIS — I48.0 PAROXYSMAL ATRIAL FIBRILLATION (H): ICD-10-CM

## 2024-04-23 PROCEDURE — 99213 OFFICE O/P EST LOW 20 MIN: CPT | Performed by: INTERNAL MEDICINE

## 2024-04-23 NOTE — LETTER
4/23/2024    Teressa Aquino, APRN CNP  [address]  [facility] [address]    RE: Jessica Ricketts       Dear Colleague,     I had the pleasure of seeing Jessica Ricketts in the [facility].  CARDIOLOGY CLINIC CONSULTATION    PRIMARY CARE PHYSICIAN:  Teressa Aquino    HISTORY OF PRESENT ILLNESS:  This a very pleasant 73-year-old female who was seen in 2023 during a hospital stay for nausea and vomiting.  Was incidentally noted to have paroxysmal atrial fibrillation.  She has paroxysmal disease on subsequent monitoring.  She has a [id number] or of 3 based on age female sex and hypertension.  Echocardiogram showed normal LV function.  She has been maintained on AV suzanne blocking agents with diltiazem and Xarelto.       Patient is asymptomatic from a cardiac standpoint.  No angina heart failure syncope presyncope.  Denies any bleeding problems.  Hemoglobin is stable.    PAST MEDICAL HISTORY:  Past Medical History:   Diagnosis Date    Allergic asthma, mild intermittent, with acute exacerbation 3/3/2012    Allergic rhinitis 10/7/2019    Formatting of this note might be different from the original. 10/7/2019 - Dr. Reddy A: Perennial and seasonal rhinitis, poorly controlled with Flonase, Allegra and Singulair. Allergy skin tests show strongest reactivity to cat dander, guinea pigs, ragweed; moderate reactivity to dog dander, dust mites, molds and tree pollens.   P -Reviewed with patient perennial and seasonal allergen avoidance and    Benign neoplasm of colon 12/30/2003    Bilateral carpal tunnel syndrome 5/7/2023    Candidiasis 10/19/2018    Essential hypertension, benign 10/31/2002    Gastroesophageal reflux disease with esophagitis 9/13/2001    History of total knee replacement 11/17/2014    Last Assessment & Plan:  Formatting of this note might be different from the original. 69yoF with h/o right TKA in Nov 2014 with new fall onto right knee 2-3 weeks ago with increased pain. Imaging  negative for fracture or hardware failure, and pain is improving.   -Reassured TKA is in place without loosening or fractures noted  -Continue activities as tolerated  -Continue exercises for ROM, streng    Hypersomnia with sleep apnea 11/1/2007    Mild single current episode of major depressive disorder (H24) 12/16/2017    Moderate persistent asthma without complication 6/12/2019    Formatting of this note might be different from the original. 10/7/2019 - Dr. Reddy  A: recurrent cough and wheeze not well controlled with Flovent 110. Allergy skin tests shows reactivity to multiple perennial and seasonal allergens that can exacerbate asthma control. Monitor for improvement with allergen desensitization.   P -Reviewed with patient the signs of asthma control include no cough or     Myopia 5/1/2008    BETO (obstructive sleep apnea) 5/7/2023    Osteopenia determined by x-ray 6/8/2018    Presbyopia 5/1/2008    Recurrent major depressive disorder, in partial remission (H24) 11/6/2020    Restless leg syndrome 2/10/2019    Sensorineural hearing loss (SNHL) of left ear with unrestricted hearing of right ear 9/21/2022    Spondylolisthesis at L5-S1 level 11/1/2017       MEDICATIONS:  Current Outpatient Medications   Medication Sig Dispense Refill    acetaminophen (TYLENOL) 325 MG tablet Take 325-650 mg by mouth every 6 hours as needed for mild pain      albuterol (PROAIR HFA/PROVENTIL HFA/VENTOLIN HFA) 108 (90 Base) MCG/ACT inhaler Inhale 2 puffs into the lungs every 4 hours as needed      Ascorbic Acid (VITAMIN C PO)       cetirizine (ZYRTEC) 10 MG tablet Take 10 mg by mouth daily      diclofenac (VOLTAREN) 1 % topical gel Apply 2-4 g topically 3 times daily as needed      diltiazem ER (DILT-XR) 180 MG 24 hr capsule Take 1 capsule (180 mg) by mouth daily 90 capsule 3    diphenhydramine-zinc acetate (BENADRYL ITCH RELIEF STICK) 2-0.1 % external stick Apply 1 g topically as needed      FLUoxetine (PROZAC) 10 MG capsule Take 10  mg by mouth daily With 20 mg capsule for total daily dose of 30 mg      FLUoxetine (PROZAC) 20 MG capsule Take 1 capsule by mouth daily With 10 mg capsule for total daily dose of 30 mg      fluticasone (FLONASE) 50 MCG/ACT nasal spray Spray 1 spray into both nostrils daily      fluticasone-vilanterol (BREO ELLIPTA) 200-25 MCG/INH inhaler Inhale 1 puff into the lungs daily      losartan (COZAAR) 100 MG tablet Take 100 mg by mouth daily      montelukast (SINGULAIR) 10 MG tablet Take 10 mg by mouth At Bedtime      multivitamin w/minerals (MULTI-VITAMIN) tablet Take 1 tablet by mouth daily      ondansetron (ZOFRAN ODT) 4 MG ODT tab Take 1 tablet (4 mg) by mouth every 8 hours as needed for nausea 10 tablet 0    pantoprazole (PROTONIX) 40 MG EC tablet Take 40 mg by mouth      pramipexole (MIRAPEX) 0.125 MG tablet Take 1 tablet by mouth At Bedtime      rivaroxaban ANTICOAGULANT (XARELTO) 20 MG TABS tablet Take 1 tablet (20 mg) by mouth daily (with dinner) 90 tablet 3    saline 0.65 % SOLN Spray 1 spray in nostril every 4 hours as needed      TURMERIC PO        No current facility-administered medications for this visit.       SOCIAL HISTORY:  I have reviewed this patient's social history and updated it with pertinent information if needed. Jessica Ricketts  reports that she has never smoked. She has never used smokeless tobacco. She reports current alcohol use. She reports that she does not use drugs.    PHYSICAL EXAM:  Pulse:  [65] 65  BP: (121)/(85) 121/85  SpO2:  [97 %] 97 %  172 lbs 0 oz    Constitutional: alert, no distress  Respiratory: Good bilateral air entry  Cardiovascular: Regular  GI: nondistended  Neuropsychiatric: appropriate affact    ASSESSMENT: Pertinent issues addressed/ reviewed during this cardiology visit  Paroxysmal atrial fibrillation  Hypertension     RECOMMENDATIONS:  Patient denies any cardiovascular symptoms.  Appears to be in sinus rhythm today.  Continue diltiazem and Xarelto.  No active bleeding  reported. Minor epistaxis reported but no major issues in that regard overall. Hemoglobin stable. If anything changes in the future, watchman can be considered.  Routine follow-up in the cardiology clinic in 12-month sooner if anything changes clinically.    It was a pleasure seeing this patient in clinic today. Please do not hesitate to contact me with any future questions.     LAWRENCE Mejias, [facility]  Cardiology - [facility]  April 23, 2024    Low complexity    This note was completed in part using dictation via the Dragon voice recognition software. Some word and grammatical errors may occur and must be interpreted in the appropriate clinical context.  If there are any questions pertaining to this issue, please contact me for further clarification.      Thank you for allowing me to participate in the care of your patient.      Sincerely,     Dorys Parnell MD     [facility]  cc:   Joao Hidalgo MD  [address] [facility] [address]  [place] [address]

## 2024-04-23 NOTE — PROGRESS NOTES
CARDIOLOGY CLINIC CONSULTATION    PRIMARY CARE PHYSICIAN:  Teressa Aquino    HISTORY OF PRESENT ILLNESS:  This a very pleasant 73-year-old female who was seen in 2023 during a hospital stay for nausea and vomiting.  Was incidentally noted to have paroxysmal atrial fibrillation.  She has paroxysmal disease on subsequent monitoring.  She has a [id number] or of 3 based on age female sex and hypertension.  Echocardiogram showed normal LV function.  She has been maintained on AV suzanne blocking agents with diltiazem and Xarelto.       Patient is asymptomatic from a cardiac standpoint.  No angina heart failure syncope presyncope.  Denies any bleeding problems.  Hemoglobin is stable.    PAST MEDICAL HISTORY:  Past Medical History:   Diagnosis Date    Allergic asthma, mild intermittent, with acute exacerbation 3/3/2012    Allergic rhinitis 10/7/2019    Formatting of this note might be different from the original. 10/7/2019 - Dr. Barry BOJORQUEZ: Perennial and seasonal rhinitis, poorly controlled with Flonase, Allegra and Singulair. Allergy skin tests show strongest reactivity to cat dander, guinea pigs, ragweed; moderate reactivity to dog dander, dust mites, molds and tree pollens.   P -Reviewed with patient perennial and seasonal allergen avoidance and    Benign neoplasm of colon 12/30/2003    Bilateral carpal tunnel syndrome 5/7/2023    Candidiasis 10/19/2018    Essential hypertension, benign 10/31/2002    Gastroesophageal reflux disease with esophagitis 9/13/2001    History of total knee replacement 11/17/2014    Last Assessment & Plan:  Formatting of this note might be different from the original. 69yoF with h/o right TKA in Nov 2014 with new fall onto right knee 2-3 weeks ago with increased pain. Imaging negative for fracture or hardware failure, and pain is improving.   -Reassured TKA is in place without loosening or fractures noted  -Continue activities as tolerated  -Continue exercises for ROM, streng    Hypersomnia  with sleep apnea 11/1/2007    Mild single current episode of major depressive disorder (H24) 12/16/2017    Moderate persistent asthma without complication 6/12/2019    Formatting of this note might be different from the original. 10/7/2019 - Dr. Reddy  A: recurrent cough and wheeze not well controlled with Flovent 110. Allergy skin tests shows reactivity to multiple perennial and seasonal allergens that can exacerbate asthma control. Monitor for improvement with allergen desensitization.   P -Reviewed with patient the signs of asthma control include no cough or     Myopia 5/1/2008    BETO (obstructive sleep apnea) 5/7/2023    Osteopenia determined by x-ray 6/8/2018    Presbyopia 5/1/2008    Recurrent major depressive disorder, in partial remission (H24) 11/6/2020    Restless leg syndrome 2/10/2019    Sensorineural hearing loss (SNHL) of left ear with unrestricted hearing of right ear 9/21/2022    Spondylolisthesis at L5-S1 level 11/1/2017       MEDICATIONS:  Current Outpatient Medications   Medication Sig Dispense Refill    acetaminophen (TYLENOL) 325 MG tablet Take 325-650 mg by mouth every 6 hours as needed for mild pain      albuterol (PROAIR HFA/PROVENTIL HFA/VENTOLIN HFA) 108 (90 Base) MCG/ACT inhaler Inhale 2 puffs into the lungs every 4 hours as needed      Ascorbic Acid (VITAMIN C PO)       cetirizine (ZYRTEC) 10 MG tablet Take 10 mg by mouth daily      diclofenac (VOLTAREN) 1 % topical gel Apply 2-4 g topically 3 times daily as needed      diltiazem ER (DILT-XR) 180 MG 24 hr capsule Take 1 capsule (180 mg) by mouth daily 90 capsule 3    diphenhydramine-zinc acetate (BENADRYL ITCH RELIEF STICK) 2-0.1 % external stick Apply 1 g topically as needed      FLUoxetine (PROZAC) 10 MG capsule Take 10 mg by mouth daily With 20 mg capsule for total daily dose of 30 mg      FLUoxetine (PROZAC) 20 MG capsule Take 1 capsule by mouth daily With 10 mg capsule for total daily dose of 30 mg      fluticasone (FLONASE) 50  MCG/ACT nasal spray Spray 1 spray into both nostrils daily      fluticasone-vilanterol (BREO ELLIPTA) 200-25 MCG/INH inhaler Inhale 1 puff into the lungs daily      losartan (COZAAR) 100 MG tablet Take 100 mg by mouth daily      montelukast (SINGULAIR) 10 MG tablet Take 10 mg by mouth At Bedtime      multivitamin w/minerals (MULTI-VITAMIN) tablet Take 1 tablet by mouth daily      ondansetron (ZOFRAN ODT) 4 MG ODT tab Take 1 tablet (4 mg) by mouth every 8 hours as needed for nausea 10 tablet 0    pantoprazole (PROTONIX) 40 MG EC tablet Take 40 mg by mouth      pramipexole (MIRAPEX) 0.125 MG tablet Take 1 tablet by mouth At Bedtime      rivaroxaban ANTICOAGULANT (XARELTO) 20 MG TABS tablet Take 1 tablet (20 mg) by mouth daily (with dinner) 90 tablet 3    saline 0.65 % SOLN Spray 1 spray in nostril every 4 hours as needed      TURMERIC PO        No current facility-administered medications for this visit.       SOCIAL HISTORY:  I have reviewed this patient's social history and updated it with pertinent information if needed. Jessica Ricketts  reports that she has never smoked. She has never used smokeless tobacco. She reports current alcohol use. She reports that she does not use drugs.    PHYSICAL EXAM:  Pulse:  [65] 65  BP: (121)/(85) 121/85  SpO2:  [97 %] 97 %  172 lbs 0 oz    Constitutional: alert, no distress  Respiratory: Good bilateral air entry  Cardiovascular: Regular  GI: nondistended  Neuropsychiatric: appropriate affact    ASSESSMENT: Pertinent issues addressed/ reviewed during this cardiology visit  Paroxysmal atrial fibrillation  Hypertension     RECOMMENDATIONS:  Patient denies any cardiovascular symptoms.  Appears to be in sinus rhythm today.  Continue diltiazem and Xarelto.  No active bleeding reported. Minor epistaxis reported but no major issues in that regard overall. Hemoglobin stable. If anything changes in the future, watchman can be considered.  Routine follow-up in the cardiology clinic in  12-month sooner if anything changes clinically.    It was a pleasure seeing this patient in clinic today. Please do not hesitate to contact me with any future questions.     LAWRENCE Mejias, [facility]  Cardiology - [facility]  April 23, 2024    Low complexity    This note was completed in part using dictation via the Dragon voice recognition software. Some word and grammatical errors may occur and must be interpreted in the appropriate clinical context.  If there are any questions pertaining to this issue, please contact me for further clarification.

## 2024-07-18 ENCOUNTER — APPOINTMENT (OUTPATIENT)
Dept: GENERAL RADIOLOGY | Facility: CLINIC | Age: 74
End: 2024-07-18
Attending: PHYSICIAN ASSISTANT
Payer: COMMERCIAL

## 2024-07-18 ENCOUNTER — HOSPITAL ENCOUNTER (EMERGENCY)
Facility: CLINIC | Age: 74
Discharge: HOME | End: 2024-07-18
Attending: PHYSICIAN ASSISTANT | Admitting: PHYSICIAN ASSISTANT
Payer: COMMERCIAL

## 2024-07-18 VITALS
OXYGEN SATURATION: 95 % | SYSTOLIC BLOOD PRESSURE: 148 MMHG | RESPIRATION RATE: 16 BRPM | TEMPERATURE: 97.8 F | HEART RATE: 68 BPM | DIASTOLIC BLOOD PRESSURE: 110 MMHG

## 2024-07-18 DIAGNOSIS — J02.9 SORE THROAT: ICD-10-CM

## 2024-07-18 LAB
ANION GAP SERPL CALCULATED.3IONS-SCNC: 9 MMOL/L (ref 7–15)
BASOPHILS # BLD AUTO: 0.1 10E3/UL (ref 0–0.2)
BASOPHILS NFR BLD AUTO: 1 %
BUN SERPL-MCNC: 21.1 MG/DL (ref 8–23)
CALCIUM SERPL-MCNC: 9 MG/DL (ref 8.8–10.4)
CHLORIDE SERPL-SCNC: 104 MMOL/L (ref 98–107)
CREAT SERPL-MCNC: 0.84 MG/DL (ref 0.51–0.95)
EGFRCR SERPLBLD CKD-EPI 2021: 74 ML/MIN/1.73M2
EOSINOPHIL # BLD AUTO: 0.1 10E3/UL (ref 0–0.7)
EOSINOPHIL NFR BLD AUTO: 2 %
ERYTHROCYTE [DISTWIDTH] IN BLOOD BY AUTOMATED COUNT: 14.2 % (ref 10–15)
GLUCOSE SERPL-MCNC: 130 MG/DL (ref 70–99)
HCO3 SERPL-SCNC: 24 MMOL/L (ref 22–29)
HCT VFR BLD AUTO: 38.9 % (ref 35–47)
HGB BLD-MCNC: 12.9 G/DL (ref 11.7–15.7)
IMM GRANULOCYTES # BLD: 0 10E3/UL
IMM GRANULOCYTES NFR BLD: 0 %
LYMPHOCYTES # BLD AUTO: 2.2 10E3/UL (ref 0.8–5.3)
LYMPHOCYTES NFR BLD AUTO: 32 %
MCH RBC QN AUTO: 30.4 PG (ref 26.5–33)
MCHC RBC AUTO-ENTMCNC: 33.2 G/DL (ref 31.5–36.5)
MCV RBC AUTO: 92 FL (ref 78–100)
MONOCYTES # BLD AUTO: 0.4 10E3/UL (ref 0–1.3)
MONOCYTES NFR BLD AUTO: 7 %
NEUTROPHILS # BLD AUTO: 3.8 10E3/UL (ref 1.6–8.3)
NEUTROPHILS NFR BLD AUTO: 58 %
NRBC # BLD AUTO: 0 10E3/UL
NRBC BLD AUTO-RTO: 0 /100
PLATELET # BLD AUTO: 330 10E3/UL (ref 150–450)
POTASSIUM SERPL-SCNC: 4.1 MMOL/L (ref 3.4–5.3)
RBC # BLD AUTO: 4.24 10E6/UL (ref 3.8–5.2)
SODIUM SERPL-SCNC: 137 MMOL/L (ref 135–145)
WBC # BLD AUTO: 6.6 10E3/UL (ref 4–11)

## 2024-07-18 PROCEDURE — 99284 EMERGENCY DEPT VISIT MOD MDM: CPT | Mod: 25

## 2024-07-18 PROCEDURE — 71046 X-RAY EXAM CHEST 2 VIEWS: CPT

## 2024-07-18 PROCEDURE — 70360 X-RAY EXAM OF NECK: CPT

## 2024-07-18 PROCEDURE — 74018 RADEX ABDOMEN 1 VIEW: CPT

## 2024-07-18 PROCEDURE — 85004 AUTOMATED DIFF WBC COUNT: CPT | Performed by: PHYSICIAN ASSISTANT

## 2024-07-18 PROCEDURE — 80048 BASIC METABOLIC PNL TOTAL CA: CPT | Performed by: PHYSICIAN ASSISTANT

## 2024-07-18 PROCEDURE — 36415 COLL VENOUS BLD VENIPUNCTURE: CPT | Performed by: PHYSICIAN ASSISTANT

## 2024-07-18 ASSESSMENT — ACTIVITIES OF DAILY LIVING (ADL)
ADLS_ACUITY_SCORE: 37

## 2024-07-18 ASSESSMENT — COLUMBIA-SUICIDE SEVERITY RATING SCALE - C-SSRS
6. HAVE YOU EVER DONE ANYTHING, STARTED TO DO ANYTHING, OR PREPARED TO DO ANYTHING TO END YOUR LIFE?: NO
1. IN THE PAST MONTH, HAVE YOU WISHED YOU WERE DEAD OR WISHED YOU COULD GO TO SLEEP AND NOT WAKE UP?: NO
2. HAVE YOU ACTUALLY HAD ANY THOUGHTS OF KILLING YOURSELF IN THE PAST MONTH?: NO

## 2024-07-18 NOTE — ED PROVIDER NOTES
History     Chief Complaint:  Swallowed Foreign Body       HPI   Jessica Ricketts is a 73 year old female with past medical history of chronic anticoagulation with Xarelto, A-fib, asthma, GERD with esophagitis, depression, BETO, hypertension presents with concerns for possible swallowed foreign body.  Patient reports she was finishing lunch swallowing food then felt a sharp sensation in her throat started coughing. She went to the bathroom and she reports she has had coughed up some mucus and slight posttussive emesis with coughing up some blood.  She only had 1 bout of hemoptysis.  She reports she still has some throat discomfort but otherwise has not been vomiting coughing.  She denies any shortness of breath abdominal pain or chest pain.  No melena.  She otherwise feels well.  She is notable on Xarelto for history of A-fib.  She speculates maybe she swallowed part of the  that was on her chocolate candy.  She denies eating anything else that potentially have bones and that there are other potential foreign body.      Independent Historian:        Review of External Notes:      Medications:    acetaminophen (TYLENOL) 325 MG tablet  albuterol (PROAIR HFA/PROVENTIL HFA/VENTOLIN HFA) 108 (90 Base) MCG/ACT inhaler  Ascorbic Acid (VITAMIN C PO)  cetirizine (ZYRTEC) 10 MG tablet  diclofenac (VOLTAREN) 1 % topical gel  diltiazem ER (DILT-XR) 180 MG 24 hr capsule  diphenhydramine-zinc acetate (BENADRYL ITCH RELIEF STICK) 2-0.1 % external stick  FLUoxetine (PROZAC) 10 MG capsule  FLUoxetine (PROZAC) 20 MG capsule  fluticasone (FLONASE) 50 MCG/ACT nasal spray  fluticasone-vilanterol (BREO ELLIPTA) 200-25 MCG/INH inhaler  losartan (COZAAR) 100 MG tablet  montelukast (SINGULAIR) 10 MG tablet  multivitamin w/minerals (MULTI-VITAMIN) tablet  ondansetron (ZOFRAN ODT) 4 MG ODT tab  pantoprazole (PROTONIX) 40 MG EC tablet  pramipexole (MIRAPEX) 0.125 MG tablet  rivaroxaban ANTICOAGULANT (XARELTO) 20 MG TABS  tablet  saline 0.65 % SOLN  TURMERIC PO        Past Medical History:    Past Medical History:   Diagnosis Date    Allergic asthma, mild intermittent, with acute exacerbation 3/3/2012    Allergic rhinitis 10/7/2019    Benign neoplasm of colon 12/30/2003    Bilateral carpal tunnel syndrome 5/7/2023    Candidiasis 10/19/2018    Essential hypertension, benign 10/31/2002    Gastroesophageal reflux disease with esophagitis 9/13/2001    History of total knee replacement 11/17/2014    Hypersomnia with sleep apnea 11/1/2007    Mild single current episode of major depressive disorder (H24) 12/16/2017    Moderate persistent asthma without complication 6/12/2019    Myopia 5/1/2008    BETO (obstructive sleep apnea) 5/7/2023    Osteopenia determined by x-ray 6/8/2018    Presbyopia 5/1/2008    Recurrent major depressive disorder, in partial remission (H24) 11/6/2020    Restless leg syndrome 2/10/2019    Sensorineural hearing loss (SNHL) of left ear with unrestricted hearing of right ear 9/21/2022    Spondylolisthesis at L5-S1 level 11/1/2017       Past Surgical History:    Past Surgical History:   Procedure Laterality Date    ESOPHAGOSCOPY, GASTROSCOPY, DUODENOSCOPY (EGD), COMBINED N/A 5/8/2023    Procedure: Esophagoscopy, gastroscopy, duodenoscopy (EGD), combined;  Surgeon: Ethan Medina MD;  Location:  GI    TOTAL KNEE ARTHROPLASTY            Physical Exam   Patient Vitals for the past 24 hrs:   BP Temp Temp src Pulse Resp SpO2   07/18/24 1759 (!) 148/110 -- -- 68 -- 95 %   07/18/24 1719 (!) 128/93 -- -- 64 -- 94 %   07/18/24 1640 -- -- -- -- -- 96 %   07/18/24 1638 (!) 140/94 -- -- 62 -- --   07/18/24 1538 131/80 97.8  F (36.6  C) Tympanic 66 16 96 %        Physical Exam  Vitals and nursing note reviewed.   Constitutional:       Appearance: Normal appearance.   HENT:      Mouth/Throat:      Lips: Pink.      Mouth: Mucous membranes are moist.      Pharynx: Oropharynx is clear. Uvula midline. No pharyngeal swelling,  oropharyngeal exudate, posterior oropharyngeal erythema or uvula swelling.   Eyes:      General: No scleral icterus.     Conjunctiva/sclera: Conjunctivae normal.   Cardiovascular:      Rate and Rhythm: Normal rate and regular rhythm.      Heart sounds: Normal heart sounds. No murmur heard.     No friction rub. No gallop.   Pulmonary:      Effort: Pulmonary effort is normal. No respiratory distress.      Breath sounds: Normal breath sounds. No stridor. No wheezing, rhonchi or rales.   Abdominal:      General: There is no distension.      Palpations: There is no mass.      Tenderness: There is no abdominal tenderness. There is no guarding or rebound.      Hernia: No hernia is present.   Neurological:      Mental Status: She is alert and oriented to person, place, and time. Mental status is at baseline.   Psychiatric:         Mood and Affect: Mood normal.         Behavior: Behavior normal.         Thought Content: Thought content normal.           Emergency Department Course     Imaging:  Chest XR,  PA & LAT   Final Result   IMPRESSION: Negative chest. No unanticipated radiopaque foreign body      Abdomen XR 1 vw   Final Result   IMPRESSION: No evidence of bowel obstruction or pneumoperitoneum. No radiopaque foreign body in the visualized portions of the abdomen and pelvis. Moderate volume formed stool in the colon. No acute bony abnormality.      Neck soft tissue XR   Final Result   IMPRESSION: No unexpected radiopaque foreign body within the visualized neck. No prevertebral edema. Normal appearance of the epiglottis and larynx. No evidence of airway compromise. Mild-moderate multilevel cervical spondylosis.          Laboratory:  Labs Ordered and Resulted from Time of ED Arrival to Time of ED Departure   BASIC METABOLIC PANEL - Abnormal       Result Value    Sodium 137      Potassium 4.1      Chloride 104      Carbon Dioxide (CO2) 24      Anion Gap 9      Urea Nitrogen 21.1      Creatinine 0.84      GFR Estimate 74       Calcium 9.0      Glucose 130 (*)    CBC WITH PLATELETS AND DIFFERENTIAL    WBC Count 6.6      RBC Count 4.24      Hemoglobin 12.9      Hematocrit 38.9      MCV 92      MCH 30.4      MCHC 33.2      RDW 14.2      Platelet Count 330      % Neutrophils 58      % Lymphocytes 32      % Monocytes 7      % Eosinophils 2      % Basophils 1      % Immature Granulocytes 0      NRBCs per 100 WBC 0      Absolute Neutrophils 3.8      Absolute Lymphocytes 2.2      Absolute Monocytes 0.4      Absolute Eosinophils 0.1      Absolute Basophils 0.1      Absolute Immature Granulocytes 0.0      Absolute NRBCs 0.0          Procedures     Emergency Department Course & Assessments:    Interventions:  Medications - No data to display       Independent Interpretation (X-rays, CTs, rhythm strip):  Chest x-ray: No infiltrate, radiopaque foreign body, pneumothorax.  Abdominal x-ray: No abnormal gas pattern without radiopaque foreign body  Soft tissue neck x-ray: No radiopaque foreign body    Consultations/Discussion of Management or Tests:  None       Social Determinants of Health affecting care:  None     Disposition:  The patient was discharged.    Impression & Plan    CMS Diagnoses: None       Medical Decision Making:    This is a 73-year-old female that presents with concerns for potential swallowed foreign body and/or sore throat following this.  There is no clear idea of what potentially cause her sore throat what she swallowed she speculated potentially a piece of foil.  I considered foreign body, infection, upper GI bleed, abdominal source or other chest pathology.  At this time I feel the likelihood of these other differentials is low.  I suspect she is scratched her esophagus from what ever she swallowed during lunch.  X-ray imaging showed no evidence of radiopaque foreign body.  She is completely asymptomatic aside from mild irritation in her throat at this time.  There is no airway compromise and her vital signs are normal.  I  suspect what ever she is slightly choked on did cause hard coughing and she broke some blood vessels leading to the hemoptysis.  This is currently again all resolved.  I do not feel she requires any additional imaging or lab workup today.  CBC was normal BMP was normal.  I feel the patient safe to discharge home with the patient prefers to go home I think that is reasonable.  Follow-up with her primary doctor if he continues to have irritation in the next 3 to 4 days.  Return back to the emergency department she develops more difficulty swallowing, increasing pain, difficulty breathing, or worsening vomiting or hemoptysis.    Diagnosis:    ICD-10-CM    1. Sore throat  J02.9            Discharge Medications:  Discharge Medication List as of 7/18/2024  6:05 PM             7/18/2024   Noble Saravia, Noble Dunn PA-C  07/18/24 2235

## 2024-07-18 NOTE — ED TRIAGE NOTES
Patient may have accidentally swallowed a bit of a  on a piece of chocolate. C/o throat discomfort. Able to swallow water.

## 2024-07-28 ENCOUNTER — HEALTH MAINTENANCE LETTER (OUTPATIENT)
Age: 74
End: 2024-07-28

## 2025-01-11 ENCOUNTER — HOSPITAL ENCOUNTER (EMERGENCY)
Facility: CLINIC | Age: 75
Discharge: HOME | End: 2025-01-11
Attending: EMERGENCY MEDICINE | Admitting: EMERGENCY MEDICINE
Payer: COMMERCIAL

## 2025-01-11 ENCOUNTER — APPOINTMENT (OUTPATIENT)
Dept: CT IMAGING | Facility: CLINIC | Age: 75
End: 2025-01-11
Attending: EMERGENCY MEDICINE
Payer: COMMERCIAL

## 2025-01-11 VITALS
SYSTOLIC BLOOD PRESSURE: 141 MMHG | HEIGHT: 63 IN | HEART RATE: 73 BPM | RESPIRATION RATE: 18 BRPM | DIASTOLIC BLOOD PRESSURE: 90 MMHG | OXYGEN SATURATION: 96 % | BODY MASS INDEX: 32.78 KG/M2 | TEMPERATURE: 97 F | WEIGHT: 185 LBS

## 2025-01-11 DIAGNOSIS — S16.1XXA CERVICAL STRAIN, INITIAL ENCOUNTER: ICD-10-CM

## 2025-01-11 DIAGNOSIS — S09.90XA CLOSED HEAD INJURY, INITIAL ENCOUNTER: ICD-10-CM

## 2025-01-11 PROCEDURE — 72125 CT NECK SPINE W/O DYE: CPT

## 2025-01-11 PROCEDURE — 99284 EMERGENCY DEPT VISIT MOD MDM: CPT | Mod: 25

## 2025-01-11 PROCEDURE — 70450 CT HEAD/BRAIN W/O DYE: CPT

## 2025-01-11 ASSESSMENT — ACTIVITIES OF DAILY LIVING (ADL): ADLS_ACUITY_SCORE: 54

## 2025-01-11 ASSESSMENT — COLUMBIA-SUICIDE SEVERITY RATING SCALE - C-SSRS
2. HAVE YOU ACTUALLY HAD ANY THOUGHTS OF KILLING YOURSELF IN THE PAST MONTH?: NO
1. IN THE PAST MONTH, HAVE YOU WISHED YOU WERE DEAD OR WISHED YOU COULD GO TO SLEEP AND NOT WAKE UP?: NO
6. HAVE YOU EVER DONE ANYTHING, STARTED TO DO ANYTHING, OR PREPARED TO DO ANYTHING TO END YOUR LIFE?: NO

## 2025-01-11 NOTE — ED PROVIDER NOTES
"  Emergency Department Note      History of Present Illness     Chief Complaint   Fall      HPI   Jessica Ricketts is a 74 year old female with a history of hypertension, osteoarthritis and restless leg syndrome who presents for evaluation of a fall. She was out playing with her dog at around 1400 when she lost her balance and fell forward hitting right side of the face to the cabinet and again on the floor. Since the fall she has been endorsing headache and right sided neck pain. Denies any loss of consciousness or vomiting. She is on blood thinner    Independent Historian   None    Review of External Notes   Clinic notes    Past Medical History     Medical History and Problem List   Actinic keratosis  Allergic rhinitis  Antibiotic prophylaxis for dental procedure indicated due to prior joint replacement  Asthma, moderate persistent without complication  Bilateral carpal tunnel syndrome  Chronic atrophic gastritis without bleeding  Chronic low back pain  Diverticulitis  Diverticulosis  GERD with esophagitis  Hypertension  Internal hemorrhoid   Major depressive disorder, recurrent, in partial remission  Obstructive sleep apnea  Osteoarthritis  Osteopenia, determined by X-ray  Restless leg syndrome  Sensorineural hearing loss of left ear with unrestricted hearing of right ear  Shoulder impingement, right  Spondylolisthesis at L5-S1 level  Tinea corporis    Medications   Albuterol inhaler  Cyclobenzaprine  Fluoxetine  Fluticasone furoate-vilanterol inhaler  Losartan  Meloxicam  Montelukast  Pramipexole  Prednisone  Tizanidine  Triamterene-hydrochlorothiazide    Surgical History   Arthroplasty knee, right, total  Colonoscopy with polypectomy  Cystoscopy, flexible, bladder repair  ORIF ankle, right  ORIF wrist, left  Tubal ligation  Physical Exam     Patient Vitals for the past 24 hrs:   BP Temp Pulse Resp SpO2 Height Weight   01/11/25 1623 (!) 141/90 97  F (36.1  C) 73 18 96 % 1.6 m (5' 3\") 83.9 kg (185 lb)     Physical " Exam  GENERAL: well developed, pleasant  HEAD: atraumatic  EYES: pupils reactive, extraocular muscles intact, conjunctivae normal  ENT:  mucus membranes moist  NECK:  no midline tenderness, lateral tenderness  RESPIRATORY: no tachypnea, breath sounds clear to auscultation   CVS: normal S1/S2, no murmurs, intact distal pulses  ABDOMEN: soft, nontender, nondistention  MUSCULOSKELETAL: no deformities  SKIN: warm and dry, no acute rashes or ulceration  NEURO: GCS 15, cranial nerves intact, alert and oriented x3  PSYCH:  Mood/affect normal   Diagnostics     Lab Results   Labs Ordered and Resulted from Time of ED Arrival to Time of ED Departure - No data to display    Imaging   CT Cervical Spine w/o Contrast   Final Result   IMPRESSION:   HEAD CT:   1.  No acute intracranial process.      CERVICAL SPINE CT:   1.  No CT evidence for acute fracture or post traumatic subluxation.      CT Head w/o Contrast   Final Result   IMPRESSION:   HEAD CT:   1.  No acute intracranial process.      CERVICAL SPINE CT:   1.  No CT evidence for acute fracture or post traumatic subluxation.        Independent Interpretation   CT Head: No intracranial hemorrhage or midline shift.    ED Course      Medications Administered   Medications - No data to display    Procedures   Procedures     Discussion of Management   None    ED Course   ED Course as of 01/12/25 0942   Sat Jan 11, 2025   0713 I obtained the history and examined the patient as above.        Additional Documentation  None    Medical Decision Making / Diagnosis     CMS Diagnoses: None    MIPS       None    MDM   Jessica Ricketts is a 74 year old female presents with  mechanical fall that occurred while she was playing with her dogs.  CT head and neck are normal.  She has no other injuries from the fall except some mild pain to the right hand but wrist exam is normal, no bruising or deformity, likely contusion.  She agrees we don't need an xray.  Discussed findings and  treatment.    Disposition   The patient was discharged.     Diagnosis     ICD-10-CM    1. Closed head injury, initial encounter  S09.90XA       2. Cervical strain, initial encounter  S16.1XXA          Discharge Medications   New Prescriptions    No medications on file     Scribe Disclosure:  Peter BLACKMON, am serving as a scribe at 5:09 PM on 1/11/2025 to document services personally performed by Daniel Ayala MD based on my observations and the provider's statements to me.        Daniel Ayala MD  01/12/25 0902

## 2025-01-11 NOTE — ED TRIAGE NOTES
Pt arrives via triage presenting after fall around 1400. Pt was playing with dog, lost balance, and fell. Pt hit right side of face on cabinet and then back of head on ground. Pt on thinners. Reports headache and neck pain. No other neuro deficits noted. C-collar applied in triage.      Triage Assessment (Adult)       Row Name 01/11/25 1623          Triage Assessment    Airway WDL WDL        Respiratory WDL    Respiratory WDL WDL        Cardiac WDL    Cardiac WDL WDL        Peripheral/Neurovascular WDL    Peripheral Neurovascular WDL WDL        Cognitive/Neuro/Behavioral WDL    Cognitive/Neuro/Behavioral WDL WDL

## 2025-01-11 NOTE — DISCHARGE INSTRUCTIONS
Discharge Instructions  Head Injury    You have been seen today for a head injury. You were checked for serious problems, like bleeding on the brain, but these problems cannot always be found right away.  Due to this risk, you should not be alone for 24 hours after your injury.  Follow up with your regular physician in *** days. If you are taking a blood thinner, such as aspirin, Pradaxa  (dabigatran), Coumadin  (warfarin), or Plavix  (clopidogrel), you are at especially high risk for immediate or delayed bleeding, and need to re-check with a physician in 24 hours, or sooner if any of the symptoms below happen.     Return to the Emergency Department if:  You are confused, have amnesia, or you are not acting right.  Your headache gets worse or you start to have a really bad headache even with your recommended treatment plan.  You vomit more than once.  You have a convulsion or seizure.  You have trouble walking.  You have weakness or paralysis in an arm or a leg.  You have blood or fluid coming from your ears or nose.  You have new symptoms or anything that worries you.    Sleeping:  It is okay for you to sleep, but someone should wake you up as instructed by your doctor, and someone should check on you at your usual time to wake up.     Activity:  Do not drive for at least 24 hours.  Do not drive if you have dizzy spells or trouble concentrating, or remembering things.  Do not return to any contact sports until cleared by your regular doctor.     Follow-up:  It is very important that you make an appointment with your clinic and go to the appointment.  If you do not follow-up with your regular doctor, it may result in missing an important development which could result in permanent injury or disability and/or lasting pain.  If there is any problem keeping your appointment, call your doctor or return to the Emergency Department.    MORE INFORMATION:    Concussion:  A concussion is a minor head injury that may cause  temporary problems with the way your brain works.  Some symptoms include:  confusion, amnesia, nausea and vomiting, dizziness, fatigue, memory or concentration problems, irritability and sleep problems.    CT Scans: Your evaluation today may have included a CT scan (CAT scan) to look for things like bleeding or a skull fracture (break).  CT scans involve radiation and too many CT scans can cause serious health problems like cancer, especially in children.  Because of this, your doctor may not have ordered a CT scan today if they think you are at low risk for a serious or life threatening problem.    If you were given a prescription for medicine here today, be sure to read all of the information (including the package insert) that comes with your prescription.  This will include important information about the medicine, its side effects, and any warnings that you need to know about.  The pharmacist who fills the prescription can provide more information and answer questions you may have about the medicine.  If you have questions or concerns that the pharmacist cannot address, please call or return to the Emergency Department.     Opioid Medication Information    Pain medications are among the most commonly prescribed medicines, so we are including this information for all our patients. If you did not receive pain medication or get a prescription for pain medicine, you can ignore it.     You may have been given a prescription for an opioid (narcotic) pain medicine and/or have received a pain medicine while here in the Emergency Department. These medicines can make you drowsy or impaired. You must not drive, operate dangerous equipment, or engage in any other dangerous activities while taking these medications. If you drive while taking these medications, you could be arrested for DUI, or driving under the influence. Do not drink any alcohol while you are taking these medications.     Opioid pain medications can cause  addiction. If you have a history of chemical dependency of any type, you are at a higher risk of becoming addicted to pain medications.  Only take these prescribed medications to treat your pain when all other options have been tried. Take it for as short a time and as few doses as possible. Store your pain pills in a secure place, as they are frequently stolen and provide a dangerous opportunity for children or visitors in your house to start abusing these powerful medications. We will not replace any lost or stolen medicine.  As soon as your pain is better, you should flush all your remaining medication.     Many prescription pain medications contain Tylenol  (acetaminophen), including Vicodin , Tylenol #3 , Norco , Lortab , and Percocet .  You should not take any extra pills of Tylenol  if you are using these prescription medications or you can get very sick.  Do not ever take more than 3000 mg of acetaminophen in any 24 hour period.    All opioids tend to cause constipation. Drink plenty of water and eat foods that have a lot of fiber, such as fruits, vegetables, prune juice, apple juice and high fiber cereal.  Take a laxative if you don t move your bowels at least every other day. Miralax , Milk of Magnesia, Colace , or Senna  can be used to keep you regular.      Remember that you can always come back to the Emergency Department if you are not able to see your regular doctor in the amount of time listed above, if you get any new symptoms, or if there is anything that worries you.

## 2025-03-06 SDOH — HEALTH STABILITY: PHYSICAL HEALTH: ON AVERAGE, HOW MANY MINUTES DO YOU ENGAGE IN EXERCISE AT THIS LEVEL?: 0 MIN

## 2025-03-06 SDOH — HEALTH STABILITY: PHYSICAL HEALTH: ON AVERAGE, HOW MANY DAYS PER WEEK DO YOU ENGAGE IN MODERATE TO STRENUOUS EXERCISE (LIKE A BRISK WALK)?: 2 DAYS

## 2025-03-06 ASSESSMENT — ASTHMA QUESTIONNAIRES
QUESTION_5 LAST FOUR WEEKS HOW WOULD YOU RATE YOUR ASTHMA CONTROL: COMPLETELY CONTROLLED
ACT_TOTALSCORE: 23
QUESTION_4 LAST FOUR WEEKS HOW OFTEN HAVE YOU USED YOUR RESCUE INHALER OR NEBULIZER MEDICATION (SUCH AS ALBUTEROL): ONCE A WEEK OR LESS
QUESTION_2 LAST FOUR WEEKS HOW OFTEN HAVE YOU HAD SHORTNESS OF BREATH: NOT AT ALL
ACT_TOTALSCORE: 23
QUESTION_1 LAST FOUR WEEKS HOW MUCH OF THE TIME DID YOUR ASTHMA KEEP YOU FROM GETTING AS MUCH DONE AT WORK, SCHOOL OR AT HOME: NONE OF THE TIME
QUESTION_3 LAST FOUR WEEKS HOW OFTEN DID YOUR ASTHMA SYMPTOMS (WHEEZING, COUGHING, SHORTNESS OF BREATH, CHEST TIGHTNESS OR PAIN) WAKE YOU UP AT NIGHT OR EARLIER THAN USUAL IN THE MORNING: ONCE OR TWICE

## 2025-03-06 ASSESSMENT — SOCIAL DETERMINANTS OF HEALTH (SDOH): HOW OFTEN DO YOU GET TOGETHER WITH FRIENDS OR RELATIVES?: TWICE A WEEK

## 2025-03-10 PROBLEM — I48.0 PAROXYSMAL ATRIAL FIBRILLATION (H): Status: ACTIVE | Noted: 2023-05-23

## 2025-03-10 PROBLEM — I67.9 SMALL VESSEL DISEASE, CEREBROVASCULAR: Status: ACTIVE | Noted: 2024-03-18

## 2025-03-10 PROBLEM — L57.0 ACTINIC KERATOSIS: Status: ACTIVE | Noted: 2018-10-19

## 2025-03-10 PROBLEM — Z98.890 S/P COLONOSCOPY WITH POLYPECTOMY: Status: ACTIVE | Noted: 2019-02-21

## 2025-03-10 PROBLEM — M19.91 PRIMARY OSTEOARTHRITIS: Status: ACTIVE | Noted: 2019-02-10

## 2025-03-10 PROBLEM — L90.0 LICHEN SCLEROSUS: Status: ACTIVE | Noted: 2024-04-22

## 2025-03-11 ENCOUNTER — OFFICE VISIT (OUTPATIENT)
Dept: FAMILY MEDICINE | Facility: CLINIC | Age: 75
End: 2025-03-11
Payer: COMMERCIAL

## 2025-03-11 VITALS
OXYGEN SATURATION: 98 % | HEIGHT: 63 IN | TEMPERATURE: 97.1 F | HEART RATE: 76 BPM | RESPIRATION RATE: 18 BRPM | WEIGHT: 181.1 LBS | SYSTOLIC BLOOD PRESSURE: 126 MMHG | DIASTOLIC BLOOD PRESSURE: 84 MMHG | BODY MASS INDEX: 32.09 KG/M2

## 2025-03-11 DIAGNOSIS — Z11.59 NEED FOR HEPATITIS C SCREENING TEST: ICD-10-CM

## 2025-03-11 DIAGNOSIS — Z12.31 ENCOUNTER FOR SCREENING MAMMOGRAM FOR MALIGNANT NEOPLASM OF BREAST: ICD-10-CM

## 2025-03-11 DIAGNOSIS — F33.41 RECURRENT MAJOR DEPRESSIVE DISORDER, IN PARTIAL REMISSION: ICD-10-CM

## 2025-03-11 DIAGNOSIS — J45.40 MODERATE PERSISTENT ASTHMA WITHOUT COMPLICATION: ICD-10-CM

## 2025-03-11 DIAGNOSIS — M85.89 OTHER SPECIFIED DISORDERS OF BONE DENSITY AND STRUCTURE, MULTIPLE SITES: ICD-10-CM

## 2025-03-11 DIAGNOSIS — J45.21 ALLERGIC ASTHMA, MILD INTERMITTENT, WITH ACUTE EXACERBATION: ICD-10-CM

## 2025-03-11 DIAGNOSIS — I10 ESSENTIAL HYPERTENSION, BENIGN: ICD-10-CM

## 2025-03-11 DIAGNOSIS — I67.9 SMALL VESSEL DISEASE, CEREBROVASCULAR: ICD-10-CM

## 2025-03-11 DIAGNOSIS — Z00.00 ENCOUNTER FOR MEDICARE ANNUAL WELLNESS EXAM: Primary | ICD-10-CM

## 2025-03-11 DIAGNOSIS — J30.1 SEASONAL ALLERGIC RHINITIS DUE TO POLLEN: ICD-10-CM

## 2025-03-11 DIAGNOSIS — R19.5 DARK STOOLS: ICD-10-CM

## 2025-03-11 DIAGNOSIS — I48.0 PAROXYSMAL ATRIAL FIBRILLATION (H): ICD-10-CM

## 2025-03-11 DIAGNOSIS — M15.0 PRIMARY OSTEOARTHRITIS INVOLVING MULTIPLE JOINTS: ICD-10-CM

## 2025-03-11 DIAGNOSIS — M85.80 OSTEOPENIA DETERMINED BY X-RAY: ICD-10-CM

## 2025-03-11 DIAGNOSIS — R53.83 FATIGUE, UNSPECIFIED TYPE: ICD-10-CM

## 2025-03-11 DIAGNOSIS — R19.09 ABDOMINAL MASS OF OTHER SITE: ICD-10-CM

## 2025-03-11 DIAGNOSIS — M79.10 MYALGIA: ICD-10-CM

## 2025-03-11 PROBLEM — M25.9 DISORDER OF SHOULDER: Status: RESOLVED | Noted: 2018-05-31 | Resolved: 2025-03-11

## 2025-03-11 PROBLEM — B37.9 CANDIDIASIS: Status: RESOLVED | Noted: 2018-10-19 | Resolved: 2025-03-11

## 2025-03-11 PROBLEM — G56.03 BILATERAL CARPAL TUNNEL SYNDROME: Status: RESOLVED | Noted: 2023-05-07 | Resolved: 2025-03-11

## 2025-03-11 PROBLEM — R10.31 ABDOMINAL PAIN, RIGHT LOWER QUADRANT: Status: RESOLVED | Noted: 2023-05-07 | Resolved: 2025-03-11

## 2025-03-11 PROBLEM — R11.11 VOMITING WITHOUT NAUSEA, UNSPECIFIED VOMITING TYPE: Status: RESOLVED | Noted: 2023-05-07 | Resolved: 2025-03-11

## 2025-03-11 PROBLEM — E87.1 HYPONATREMIA: Status: RESOLVED | Noted: 2023-05-07 | Resolved: 2025-03-11

## 2025-03-11 PROBLEM — R10.13 ABDOMINAL PAIN, EPIGASTRIC: Status: RESOLVED | Noted: 2023-05-07 | Resolved: 2025-03-11

## 2025-03-11 LAB
BASOPHILS # BLD AUTO: 0.1 10E3/UL (ref 0–0.2)
BASOPHILS NFR BLD AUTO: 1 %
EOSINOPHIL # BLD AUTO: 0.1 10E3/UL (ref 0–0.7)
EOSINOPHIL NFR BLD AUTO: 1 %
ERYTHROCYTE [DISTWIDTH] IN BLOOD BY AUTOMATED COUNT: 14.2 % (ref 10–15)
HCT VFR BLD AUTO: 40 % (ref 35–47)
HGB BLD-MCNC: 13.4 G/DL (ref 11.7–15.7)
IMM GRANULOCYTES # BLD: 0 10E3/UL
IMM GRANULOCYTES NFR BLD: 0 %
LYMPHOCYTES # BLD AUTO: 1.5 10E3/UL (ref 0.8–5.3)
LYMPHOCYTES NFR BLD AUTO: 17 %
MCH RBC QN AUTO: 29.5 PG (ref 26.5–33)
MCHC RBC AUTO-ENTMCNC: 33.5 G/DL (ref 31.5–36.5)
MCV RBC AUTO: 88 FL (ref 78–100)
MONOCYTES # BLD AUTO: 0.6 10E3/UL (ref 0–1.3)
MONOCYTES NFR BLD AUTO: 6 %
NEUTROPHILS # BLD AUTO: 6.9 10E3/UL (ref 1.6–8.3)
NEUTROPHILS NFR BLD AUTO: 76 %
PLATELET # BLD AUTO: 384 10E3/UL (ref 150–450)
RBC # BLD AUTO: 4.55 10E6/UL (ref 3.8–5.2)
WBC # BLD AUTO: 9.1 10E3/UL (ref 4–11)

## 2025-03-11 PROCEDURE — 80061 LIPID PANEL: CPT | Performed by: NURSE PRACTITIONER

## 2025-03-11 PROCEDURE — 3079F DIAST BP 80-89 MM HG: CPT | Performed by: NURSE PRACTITIONER

## 2025-03-11 PROCEDURE — 82607 VITAMIN B-12: CPT | Performed by: NURSE PRACTITIONER

## 2025-03-11 PROCEDURE — 1126F AMNT PAIN NOTED NONE PRSNT: CPT | Performed by: NURSE PRACTITIONER

## 2025-03-11 PROCEDURE — G0438 PPPS, INITIAL VISIT: HCPCS | Performed by: NURSE PRACTITIONER

## 2025-03-11 PROCEDURE — 99214 OFFICE O/P EST MOD 30 MIN: CPT | Mod: 25 | Performed by: NURSE PRACTITIONER

## 2025-03-11 PROCEDURE — 82550 ASSAY OF CK (CPK): CPT | Performed by: NURSE PRACTITIONER

## 2025-03-11 PROCEDURE — G0009 ADMIN PNEUMOCOCCAL VACCINE: HCPCS | Performed by: NURSE PRACTITIONER

## 2025-03-11 PROCEDURE — 90677 PCV20 VACCINE IM: CPT | Performed by: NURSE PRACTITIONER

## 2025-03-11 PROCEDURE — 86803 HEPATITIS C AB TEST: CPT | Performed by: NURSE PRACTITIONER

## 2025-03-11 PROCEDURE — 84443 ASSAY THYROID STIM HORMONE: CPT | Performed by: NURSE PRACTITIONER

## 2025-03-11 PROCEDURE — 36415 COLL VENOUS BLD VENIPUNCTURE: CPT | Performed by: NURSE PRACTITIONER

## 2025-03-11 PROCEDURE — 85025 COMPLETE CBC W/AUTO DIFF WBC: CPT | Performed by: NURSE PRACTITIONER

## 2025-03-11 PROCEDURE — 3074F SYST BP LT 130 MM HG: CPT | Performed by: NURSE PRACTITIONER

## 2025-03-11 RX ORDER — DULOXETIN HYDROCHLORIDE 60 MG/1
60 CAPSULE, DELAYED RELEASE ORAL DAILY
Qty: 30 CAPSULE | Refills: 0 | Status: SHIPPED | OUTPATIENT
Start: 2025-03-11

## 2025-03-11 RX ORDER — OXYMETAZOLINE HYDROCHLORIDE 0.05 G/100ML
2 SPRAY NASAL
COMMUNITY

## 2025-03-11 RX ORDER — GABAPENTIN 100 MG/1
1 CAPSULE ORAL 3 TIMES DAILY
COMMUNITY
Start: 2024-12-12

## 2025-03-11 RX ORDER — AMLODIPINE BESYLATE 5 MG/1
5 TABLET ORAL DAILY
COMMUNITY
Start: 2023-05-10

## 2025-03-11 RX ORDER — ATORVASTATIN CALCIUM 20 MG/1
20 TABLET, FILM COATED ORAL DAILY
COMMUNITY
Start: 2025-03-06

## 2025-03-11 RX ORDER — PREDNISONE 20 MG/1
TABLET ORAL
COMMUNITY
Start: 2023-09-11

## 2025-03-11 RX ORDER — VIT C/B6/B5/MAGNESIUM/HERB 173 50-5-6-5MG
500 CAPSULE ORAL
COMMUNITY

## 2025-03-11 ASSESSMENT — PAIN SCALES - GENERAL: PAINLEVEL_OUTOF10: NO PAIN (0)

## 2025-03-11 ASSESSMENT — PATIENT HEALTH QUESTIONNAIRE - PHQ9
10. IF YOU CHECKED OFF ANY PROBLEMS, HOW DIFFICULT HAVE THESE PROBLEMS MADE IT FOR YOU TO DO YOUR WORK, TAKE CARE OF THINGS AT HOME, OR GET ALONG WITH OTHER PEOPLE: SOMEWHAT DIFFICULT
SUM OF ALL RESPONSES TO PHQ QUESTIONS 1-9: 10
SUM OF ALL RESPONSES TO PHQ QUESTIONS 1-9: 10

## 2025-03-11 NOTE — PROGRESS NOTES
Preventive Care Visit  [facility] ROSIBEL Yang CNP, Family Medicine  Mar 11, 2025      Assessment & Plan     (Z00.00) Encounter for Medicare annual wellness exam  (primary encounter diagnosis)  Comment:   Plan: Comprehensive metabolic panel (BMP + Alb, Alk         Phos, ALT, AST, Total. Bili, TP), Lipid panel         reflex to direct LDL Fasting            (M79.10) Myalgia with proximal symmetric UE weakness that is not progressive  Comment: This is a new condition of unclear etiology  Plan: CK total, CBC with Platelets & Differential,         DULoxetine (CYMBALTA) 60 MG capsule, Vitamin         B12, Physical Therapy  Referral        ESR, CRP  If initial workup negative, will plan on referral to neurology (or rheumatology if high inflammatory markers) for additional evaluation   Will treat pain with duloxetine and try starting PT to help guide gradual return to activity      (R53.83) Fatigue, unspecified type  Comment:   Plan: TSH, CBC with Platelets & Differential, Vitamin        B12, Physical Therapy  Referral            (R19.09) Abdominal mass of other site  Comment:   Plan: CT Abdomen Pelvis w Contrast            (I48.0) Paroxysmal atrial fibrillation   Plan: on anticoagulation  Heart rate and rhythm regular today      (M15.0) Primary osteoarthritis involving multiple joints  Comment: stable, continue current medications. Ok to use voltaren  Plan: gabapentin (NEURONTIN) 100 MG capsule, Turmeric        500 MG CAPS            (Z11.59) Need for hepatitis C screening test  Comment:   Plan: Hepatitis C Screen Reflex to HCV RNA Quant and         Genotype          Recurrent major depressive disorder, in partial remission  PHQ-9 score remains a little high  (10) suggesting depression is not adequately treated  Sounds like depression is worse since she's having so much UE pain and fatigue.  Will stop fluoxetine 30 mg daily and start duloxetine 60 mg daily (no  "taper)  Follow-up in 2 months (e-visit) for depression  She stopped therapy in Nov and will call her former therapist to ask about restarting. It was helpful in the past.       Moderate persistent asthma without complication  Well controlled  Asthma action plan completed  No medication changes.    Essential hypertension, benign  Well controlled, stable.   Continue current medications    BMI  Estimated body mass index is 31.77 kg/m  as calculated from the following:    Height as of this encounter: 1.608 m (5' 3.31\").    Weight as of this encounter: 82.1 kg (181 lb 1.6 oz).   Weight management plan: Discussed healthy diet and exercise guidelines- see pt instructions    Counseling  Appropriate preventive services were addressed with this patient via screening, questionnaire, or discussion as appropriate for fall prevention, nutrition, physical activity, Tobacco-use cessation, social engagement, weight loss and cognition.  Checklist reviewing preventive services available has been given to the patient.  Reviewed patient's diet, addressing concerns and/or questions.   She is at risk for lack of exercise and has been provided with information to increase physical activity for the benefit of her well-being.   Discussed possible causes of fatigue. Updated plan of care.  Patient reported difficulty with activities of daily living were addressed today.The patient's PHQ-9 score is consistent with moderate depression. She was provided with information regarding depression.       FURTHER TESTING:       - CT abdomen  - Mammogram in April  See Patient Instructions    Patricia Lopez is a 74 year old, presenting for the following:  Wellness Visit    HPI    Notes muscle pain and weakness for the past 3 months. Upper extremities more affected. Worse after prolonged sitting and rest. \"I feel like I'm 89\"  Feels that her upper arms are weak and very sore. Onset was in December, not related to a fall or viral infection.   When " visiting her brother in [place], she was unable to get up off a mattress on the floor   She also feels generalized fatigue.     Stopped going to [employer] and exercising in Dec. for fear of falling    Advance Care Planning  Patient does not have a Health Care Directive: Discussed advance care planning with patient; however, patient declined at this time.      3/6/2025   General Health   How would you rate your overall physical health? (!) FAIR   Feel stress (tense, anxious, or unable to sleep) Only a little   (!) STRESS CONCERN      3/6/2025   Nutrition   Diet: Regular (no restrictions)         3/6/2025   Exercise   Days per week of moderate/strenous exercise 2 days   Average minutes spent exercising at this level 0 min   (!) EXERCISE CONCERN      3/6/2025   Social Factors   Frequency of gathering with friends or relatives Twice a week   Worry food won't last until get money to buy more No   Food not last or not have enough money for food? No   Do you have housing? (Housing is defined as stable permanent housing and does not include staying ouside in a car, in a tent, in an abandoned building, in an overnight shelter, or couch-surfing.) Yes   Are you worried about losing your housing? No   Lack of transportation? No   Unable to get utilities (heat,electricity)? No         3/11/2025   Fall Risk   Gait Speed Test (Document in seconds) 4.09          3/6/2025   Activities of Daily Living- Home Safety   Needs help with the following daily activites Dressing   Safety concerns in the home None of the above         3/6/2025   Dental   Dentist two times every year? Yes         3/6/2025   Hearing Screening   Hearing concerns? None of the above         3/6/2025   Driving Risk Screening   Patient/family members have concerns about driving No         3/6/2025   General Alertness/Fatigue Screening   Have you been more tired than usual lately? (!) YES         3/6/2025   Urinary Incontinence Screening   Bothered by leaking urine in past 6  months No         Today's PHQ-9 Score:       3/11/2025     8:41 AM   PHQ-9 SCORE   PHQ-9 Total Score MyChart 10 (Moderate depression)   PHQ-9 Total Score 10        Patient-reported         3/6/2025   Substance Use   Alcohol more than 3/day or more than 7/wk No   Do you have a current opioid prescription? No   How severe/bad is pain from 1 to 10? 7/10   Do you use any other substances recreationally? No     Social History     Tobacco Use    Smoking status: Never     Passive exposure: Never    Smokeless tobacco: Never   Vaping Use    Vaping status: Never Used   Substance Use Topics    Alcohol use: Yes     Comment: 2-3 times a week    Drug use: Never       Mammogram Screening - Mammogram every 1-2 years updated in Health Maintenance based on mutual decision making    ASCVD Risk   13.9 %    Fracture Risk Assessment Tool  Link to Frax Calculator  Use the information below to complete the Frax calculator  : 1950  Sex: female  Weight (kg): 82.1 kg (actual weight)  Height (cm): 160.8 cm  Previous Fragility Fracture:  No  History of parent with fractured hip:  No  Current Smoking:  No  Patient has been on glucocorticoids for more than 3 months (5mg/day or more): No  Rheumatoid Arthritis on Problem List:  No  Secondary Osteoporosis on Problem List:  No  Consumes 3 or more units of alcohol per day: No  Femoral Neck BMD (g/cm2        3/11/2025   FRAX Calculated Score- 10yr Fracture Probability (%)   Major Osteoporotic- without BMD 11 %   Hip Fracture- without BMD 2 %     Reviewed and updated as needed this visit by Provider   Tobacco  Allergies  Meds  Problems  Med Hx  Surg Hx  Fam Hx     Sexual Activity          Past Medical History:   Diagnosis Date    Allergic asthma, mild intermittent, with acute exacerbation 2012    Allergic rhinitis 10/07/2019    Formatting of this note might be different from the original. 10/7/2019 - Dr. Barry BOJORQUEZ: Perennial and seasonal rhinitis, poorly controlled with  Flonase, Allegra and Singulair. Allergy skin tests show strongest reactivity to cat dander, guinea pigs, ragweed; moderate reactivity to dog dander, dust mites, molds and tree pollens.   P -Reviewed with patient perennial and seasonal allergen avoidance and    Arthritis 40 years    Benign neoplasm of colon 12/30/2003    Bilateral carpal tunnel syndrome 05/07/2023    Candidiasis 10/19/2018    Disorder of shoulder 05/31/2018    Essential hypertension, benign 10/31/2002    Gastroesophageal reflux disease with esophagitis 09/13/2001    Heart disease 5-    AFib    History of total knee replacement 11/17/2014    Last Assessment & Plan:  Formatting of this note might be different from the original. 69yoF with h/o right TKA in Nov 2014 with new fall onto right knee 2-3 weeks ago with increased pain. Imaging negative for fracture or hardware failure, and pain is improving.   -Reassured TKA is in place without loosening or fractures noted  -Continue activities as tolerated  -Continue exercises for ROM, streng    Hypersomnia with sleep apnea 11/01/2007    Mild single current episode of major depressive disorder 12/16/2017    Moderate persistent asthma without complication 06/12/2019    Formatting of this note might be different from the original. 10/7/2019 - Dr. Reddy  A: recurrent cough and wheeze not well controlled with Flovent 110. Allergy skin tests shows reactivity to multiple perennial and seasonal allergens that can exacerbate asthma control. Monitor for improvement with allergen desensitization.   P -Reviewed with patient the signs of asthma control include no cough or     Myopia 05/01/2008    BETO (obstructive sleep apnea) 05/07/2023    Osteopenia determined by x-ray 06/08/2018    Presbyopia 05/01/2008    Recurrent major depressive disorder, in partial remission 11/06/2020    Restless leg syndrome 02/10/2019    Sensorineural hearing loss (SNHL) of left ear with unrestricted hearing of right ear 09/21/2022     Spondylolisthesis at L5-S1 level 11/01/2017     Past Surgical History:   Procedure Laterality Date    BIOPSY  40 years    COLONOSCOPY  30 years    Had several ecams    ESOPHAGOSCOPY, GASTROSCOPY, DUODENOSCOPY (EGD), COMBINED N/A 05/08/2023    Procedure: Esophagoscopy, gastroscopy, duodenoscopy (EGD), combined;  Surgeon: Ethan Medina MD;  Location:  GI    EYE SURGERY  Dec 2024    Cataract surgery both eyes    GENITOURINARY SURGERY  40 years    Urinary surgery    ORTHOPEDIC SURGERY  12 years    Knee replacement, both knees    TOTAL KNEE ARTHROPLASTY       Current providers sharing in care for this patient include:  Patient Care Team:  nAat Aquino APRN CNP as PCP - General (Family Medicine)  Dorys Parnell MD as Assigned Heart and Vascular Provider  Dorys Parnell MD as MD (Cardiovascular Disease)    The following health maintenance items are reviewed in Epic and correct as of today:  Health Maintenance   Topic Date Due    LIPID  Never done    DEPRESSION ACTION PLAN  Never done    HEPATITIS C SCREENING  Never done    COVID-19 Vaccine (8 - 2024-25 season) 04/03/2025    BMP  07/18/2025    ASTHMA CONTROL TEST  09/11/2025    PHQ-9  09/11/2025    MAMMO SCREENING  01/29/2026    MEDICARE ANNUAL WELLNESS VISIT  03/11/2026    ANNUAL REVIEW OF HM ORDERS  03/11/2026    ASTHMA ACTION PLAN  03/11/2026    FALL RISK ASSESSMENT  03/11/2026    GLUCOSE  07/18/2027    COLORECTAL CANCER SCREENING  02/21/2029    ADVANCE CARE PLANNING  03/11/2030    DTAP/TDAP/TD IMMUNIZATION (3 - Td or Tdap) 09/15/2032    DEXA  08/19/2039    INFLUENZA VACCINE  Completed    Pneumococcal Vaccine: 50+ Years  Completed    ZOSTER IMMUNIZATION  Completed    RSV VACCINE  Completed    HPV IMMUNIZATION  Aged Out    MENINGITIS IMMUNIZATION  Aged Out         Review of Systems  CONSTITUTIONAL: NEGATIVE for fever, chills, change in weight  RESP: NEGATIVE for significant cough or SOB  CV: NEGATIVE for chest pain, palpitations or peripheral edema  No  "recent falls, fever, rash, chest pain, SOB, irregular heart rate, GI sxs, LE edema.     Objective    Exam  /84   Pulse 76   Temp 97.1  F (36.2  C) (Temporal)   Resp 18   Ht 1.608 m (5' 3.31\")   Wt 82.1 kg (181 lb 1.6 oz)   SpO2 98%   BMI 31.77 kg/m     Estimated body mass index is 31.77 kg/m  as calculated from the following:    Height as of this encounter: 1.608 m (5' 3.31\").    Weight as of this encounter: 82.1 kg (181 lb 1.6 oz).    Physical Exam  GENERAL: alert and no distress  EYES: Eyes grossly normal to inspection, PERRL and conjunctivae and sclerae normal  HENT: ear canals and TM's normal, nose and mouth without ulcers or lesions  NECK: no adenopathy, no asymmetry, masses, or scars  RESP: lungs clear to auscultation - no rales, rhonchi or wheezes  CV: regular rate and rhythm, normal S1 S2, no S3 or S4, no murmur, click or rub, no peripheral edema  ABDOMEN: soft, nontender, bowel sounds normal, and mass suprapubic mass that extends to LLQ  MS: no gross musculoskeletal defects noted, no edema  MS: normal range of motion, RUE exam shows strength 3/5, and LUE exam shows strength 3/5. Leg strength (quad and hamstring) 5/5, symmetric  SKIN: no suspicious lesions or rashes  NEURO: Normal strength and tone, mentation intact and speech normal  PSYCH: mentation appears normal, affect normal/bright  Minicog score 3         Signed Electronically by: ROSIBEL Yin CNP    Answers submitted by the patient for this visit:  Patient Health Questionnaire (Submitted on 3/11/2025)  If you checked off any problems, how difficult have these problems made it for you to do your work, take care of things at home, or get along with other people?: Somewhat difficult  PHQ9 TOTAL SCORE: 10    "

## 2025-03-11 NOTE — ASSESSMENT & PLAN NOTE
PHQ-9 score remains a little high  (10) suggesting depression is not adequately treated  Sounds like depression is worse since she's having so much UE pain and fatigue.  Will stop fluoxetine 30 mg daily and start duloxetine 60 mg daily (no taper)  Follow-up in 2 months (e-visit) for depression  She stopped therapy in Nov and will call her former therapist to ask about restarting. It was helpful in the past.

## 2025-03-11 NOTE — PATIENT INSTRUCTIONS
Checking labs today to look for a reason for fatigue and pain   We started duloxetine and physical therapy to help the muscle pain  Schedule CT scan to evaluate firm area in pelvis  If nothing shows up on evaluation today for the weakness, I'll refer your to neurology  Schedule an e-visit in 2 weeks for mental health  Call therapist  Your received a pneumonia vaccine today      Patient Education   Preventive Care Advice   This is general advice given by our system to help you stay healthy. However, your care team may have specific advice just for you. Please talk to your care team about your preventive care needs.  Nutrition  Eat 5 or more servings of fruits and vegetables each day.  Try wheat bread, brown rice and whole grain pasta (instead of white bread, rice, and pasta).  Get enough calcium and vitamin D. Check the label on foods and aim for 100% of the RDA (recommended daily allowance).  Lifestyle  Exercise at least 150 minutes each week  (30 minutes a day, 5 days a week).  Do muscle strengthening activities 2 days a week. These help control your weight and prevent disease.  No smoking.  Wear sunscreen to prevent skin cancer.  Have a dental exam and cleaning every 6 months.  Yearly exams  See your health care team every year to talk about:  Any changes in your health.  Any medicines your care team has prescribed.  Preventive care, family planning, and ways to prevent chronic diseases.  Shots (vaccines)   HPV shots (up to age 26), if you've never had them before.  Hepatitis B shots (up to age 59), if you've never had them before.  COVID-19 shot: Get this shot when it's due.  Flu shot: Get a flu shot every year.  Tetanus shot: Get a tetanus shot every 10 years.  Pneumococcal, hepatitis A, and RSV shots: Ask your care team if you need these based on your risk.  Shingles shot (for age 50 and up)  General health tests  Diabetes screening:  Starting at age 35, Get screened for diabetes at least every 3 years.  If you  are younger than age 35, ask your care team if you should be screened for diabetes.  Cholesterol test: At age 39, start having a cholesterol test every 5 years, or more often if advised.  Bone density scan (DEXA): At age 50, ask your care team if you should have this scan for osteoporosis (brittle bones).  Hepatitis C: Get tested at least once in your life.  STIs (sexually transmitted infections)  Before age 24: Ask your care team if you should be screened for STIs.  After age 24: Get screened for STIs if you're at risk. You are at risk for STIs (including HIV) if:  You are sexually active with more than one person.  You don't use condoms every time.  You or a partner was diagnosed with a sexually transmitted infection.  If you are at risk for HIV, ask about PrEP medicine to prevent HIV.  Get tested for HIV at least once in your life, whether you are at risk for HIV or not.  Cancer screening tests  Cervical cancer screening: If you have a cervix, begin getting regular cervical cancer screening tests starting at age 21.  Breast cancer scan (mammogram): If you've ever had breasts, begin having regular mammograms starting at age 40. This is a scan to check for breast cancer.  Colon cancer screening: It is important to start screening for colon cancer at age 45.  Have a colonoscopy test every 10 years (or more often if you're at risk) Or, ask your provider about stool tests like a FIT test every year or Cologuard test every 3 years.  To learn more about your testing options, visit:   .  For help making a decision, visit:   https://bit.ly/zn47416.  Prostate cancer screening test: If you have a prostate, ask your care team if a prostate cancer screening test (PSA) at age 55 is right for you.  Lung cancer screening: If you are a current or former smoker ages 50 to 80, ask your care team if ongoing lung cancer screenings are right for you.  For informational purposes only. Not to replace the advice of your health care  provider. Copyright   2023 [facility]. All rights reserved. Clinically reviewed by the [facility]. [employer] [id number] - REV 01/24.  Learning About Activities of Daily Living  What are activities of daily living?     Activities of daily living (ADLs) are the basic self-care tasks you do every day. These include eating, bathing, dressing, and moving around.  As you age, and if you have health problems, you may find that it's harder to do some of these tasks. If so, your doctor can suggest ideas that may help.  To measure what kind of help you may need, your doctor will ask how well you are able to do ADLs. Let your doctor know if there are any tasks that you are having trouble doing. This is an important first step to getting help. And when you have the help you need, you can stay as independent as possible.  How will a doctor assess your ADLs?  Asking about ADLs is part of a routine health checkup your doctor will likely do as you age. Your health check might be done in a doctor's office, in your home, or at a hospital. The goal is to find out if you are having any problems that could make it hard to care for yourself or that make it unsafe for you to be on your own.  To measure your ADLs, your doctor will ask how hard it is for you to do routine tasks. Your doctor may also want to know if you have changed the way you do a task because of a health problem. Your doctor may watch how you:  Walk back and forth.  Keep your balance while you stand or walk.  Move from sitting to standing or from a bed to a chair.  Button or unbutton a shirt or sweater.  Remove and put on your shoes.  It's common to feel a little worried or anxious if you find you can't do all the things you used to be able to do. Talking with your doctor about ADLs is a way to make sure you're as safe as possible and able to care for yourself as well as you can. You may want to bring a caregiver, friend, or family  member to your checkup. They can help you talk to your doctor.  Follow-up care is a key part of your treatment and safety. Be sure to make and go to all appointments, and call your doctor if you are having problems. It's also a good idea to know your test results and keep a list of the medicines you take.  Current as of: October 24, 2023  Content Version: 14.3    2024 [employer].   Care instructions adapted under license by your healthcare professional. If you have questions about a medical condition or this instruction, always ask your healthcare professional. [employer] disclaims any warranty or liability for your use of this information.    Preventing Falls: Care Instructions  Injuries and health problems such as trouble walking or poor eyesight can increase your risk of falling. So can some medicines. But there are things you can do to help prevent falls. You can exercise to get stronger. You can also arrange your home to make it safer.    Talk to your doctor about the medicines you take. Ask if any of them increase the risk of falls and whether they can be changed or stopped.   Try to exercise regularly. It can help improve your strength and balance. This can help lower your risk of falling.         Practice fall safety and prevention.   Wear low-heeled shoes that fit well and give your feet good support. Talk to your doctor if you have foot problems that make this hard.  Carry a cellphone or wear a medical alert device that you can use to call for help.  Use stepladders instead of chairs to reach high objects. Don't climb if you're at risk for falls. Ask for help, if needed.  Wear the correct eyeglasses, if you need them.        Make your home safer.   Remove rugs, cords, clutter, and furniture from walkways.  Keep your house well lit. Use night-lights in hallways and bathrooms.  Install and use sturdy handrails on stairways.  Wear nonskid footwear, even inside. Don't walk barefoot or  "in socks without shoes.        Be safe outside.   Use handrails, curb cuts, and ramps whenever possible.  Keep your hands free by using a shoulder bag or backpack.  Try to walk in well-lit areas. Watch out for uneven ground, changes in pavement, and debris.  Be careful in the winter. Walk on the grass or gravel when sidewalks are slippery. Use de-icer on steps and walkways. Add non-slip devices to shoes.    Put grab bars and nonskid mats in your shower or tub and near the toilet. Try to use a shower chair or bath bench when bathing.   Get into a tub or shower by putting in your weaker leg first. Get out with your strong side first. Have a phone or medical alert device in the bathroom with you.   Where can you learn more?  Go to https://www.[employer]Pad/patiented  Enter G117 in the search box to learn more about \"Preventing Falls: Care Instructions.\"  Current as of: July 31, 2024  Content Version: 14.3    2024 [employer].   Care instructions adapted under license by your healthcare professional. If you have questions about a medical condition or this instruction, always ask your healthcare professional. [employer] disclaims any warranty or liability for your use of this information.    Learning About Sleeping Well  What does sleeping well mean?     Sleeping well means getting enough sleep to feel good and stay healthy. How much sleep is enough varies among people.  The number of hours you sleep and how you feel when you wake up are both important. If you do not feel refreshed, you probably need more sleep. Another sign of not getting enough sleep is feeling tired during the day.  Experts recommend that adults get at least 7 or more hours of sleep per day. Children and older adults need more sleep.  Why is getting enough sleep important?  Getting enough quality sleep is a basic part of good health. When your sleep suffers, your physical health, mood, and your thoughts can suffer too. You may " "find yourself feeling more grumpy or stressed. Not getting enough sleep also can lead to serious problems, including injury, accidents, anxiety, and depression.  What might cause poor sleeping?  Many things can cause sleep problems, including:  Changes to your sleep schedule.  Stress. Stress can be caused by fear about a single event, such as giving a speech. Or you may have ongoing stress, such as worry about work or school.  Depression, anxiety, and other mental or emotional conditions.  Changes in your sleep habits or surroundings. This includes changes that happen where you sleep, such as noise, light, or sleeping in a different bed. It also includes changes in your sleep pattern, such as having jet lag or working a late shift.  Health problems, such as pain, breathing problems, and restless legs syndrome.  Lack of regular exercise.  Using alcohol, nicotine, or caffeine before bed.  How can you help yourself?  Here are some tips that may help you sleep more soundly and wake up feeling more refreshed.  Your sleeping area   Use your bedroom only for sleeping and sex. A bit of light reading may help you fall asleep. But if it doesn't, do your reading elsewhere in the house. Try not to use your TV, computer, smartphone, or tablet while you are in bed.  Be sure your bed is big enough to stretch out comfortably, especially if you have a sleep partner.  Keep your bedroom quiet, dark, and cool. Use curtains, blinds, or a sleep mask to block out light. To block out noise, use earplugs, soothing music, or a \"white noise\" machine.  Your evening and bedtime routine   Create a relaxing bedtime routine. You might want to take a warm shower or bath, or listen to soothing music.  Go to bed at the same time every night. And get up at the same time every morning, even if you feel tired.  What to avoid   Limit caffeine (coffee, tea, caffeinated sodas) during the day, and don't have any for at least 6 hours before bedtime.  Avoid " "drinking alcohol before bedtime. Alcohol can cause you to wake up more often during the night.  Try not to smoke or use tobacco, especially in the evening. Nicotine can keep you awake.  Limit naps during the day, especially close to bedtime.  Avoid lying in bed awake for too long. If you can't fall asleep or if you wake up in the middle of the night and can't get back to sleep within about 20 minutes, get out of bed and go to another room until you feel sleepy.  Avoid taking medicine right before bed that may keep you awake or make you feel hyper or energized. Your doctor can tell you if your medicine may do this and if you can take it earlier in the day.  If you can't sleep   Imagine yourself in a peaceful, pleasant scene. Focus on the details and feelings of being in a place that is relaxing.  Get up and do a quiet or boring activity until you feel sleepy.  Avoid drinking any liquids before going to bed to help prevent waking up often to use the bathroom.  Where can you learn more?  Go to https://www.[employer].net/patiented  Enter J942 in the search box to learn more about \"Learning About Sleeping Well.\"  Current as of: July 31, 2024  Content Version: 14.3    2024 [employer].   Care instructions adapted under license by your healthcare professional. If you have questions about a medical condition or this instruction, always ask your healthcare professional. [employer] disclaims any warranty or liability for your use of this information.    Learning About Depression Screening  What is depression screening?  Depression screening is a way to see if you have depression symptoms. It may be done by a doctor or counselor. It's often part of a routine checkup. That's because your mental health is just as important as your physical health.  Depression is a mental health condition that affects how you feel, think, and act. You may:  Have less energy.  Lose interest in your daily activities.  Feel " "sad and grouchy for a long time.  Depression is very common. It affects people of all ages.  Many things can lead to depression. Some people become depressed after they have a stroke or find out they have a major illness like cancer or heart disease. The death of a loved one or a breakup may lead to depression. It can run in families. Most experts believe that a combination of inherited genes and stressful life events can cause it.  What happens during screening?  You may be asked to fill out a form about your depression symptoms. You and the doctor will discuss your answers. The doctor may ask you more questions to learn more about how you think, act, and feel.  What happens after screening?  If you have symptoms of depression, your doctor will talk to you about your options.  Doctors usually treat depression with medicines or counseling. Often, combining the two works best. Many people don't get help because they think that they'll get over the depression on their own. But people with depression may not get better unless they get treatment.  The cause of depression is not well understood. There may be many factors involved. But if you have depression, it's not your fault.  A serious symptom of depression is thinking about death or suicide. If you or someone you care about talks about this or about feeling hopeless, get help right away.  It's important to know that depression can be treated. Medicine, counseling, and self-care may help.  Where can you learn more?  Go to https://www.[employer].net/patiented  Enter T185 in the search box to learn more about \"Learning About Depression Screening.\"  Current as of: July 31, 2024  Content Version: 14.3    2024 [employer].   Care instructions adapted under license by your healthcare professional. If you have questions about a medical condition or this instruction, always ask your healthcare professional. [employer] disclaims any warranty or " liability for your use of this information.

## 2025-03-11 NOTE — LETTER
My Asthma Action Plan    Name: Jessica Ricketts   YOB: 1950  Date: 3/11/2025   My doctor: ROSIBEL Yin CNP   My clinic: [facility]        My Control Medicine: Fluticasone furoate + vilanterol (Breo Ellipta)  -  200/25mcg 1 puff every evening  My Rescue Medicine: Albuterol (Proair/Ventolin/Proventil HFA) 2-4 puffs EVERY 4 HOURS as needed. Use a spacer if recommended by your provider.  My Oral Steroid Medicine: prednisone 40 mg once daily for 5 days My Asthma Severity:   Mild Persistent  Know your asthma triggers: smoke, upper respiratory infections, dust mites, and pollens               GREEN ZONE   Good Control  I feel good  No cough or wheeze  Can work, sleep and play without asthma symptoms       Take your asthma control medicine every day.     If exercise triggers your asthma, take your rescue medication  15 minutes before exercise or sports, and  During exercise if you have asthma symptoms  Spacer to use with inhaler: If you have a spacer, make sure to use it with your inhaler             YELLOW ZONE Getting Worse  I have ANY of these:  I do not feel good  Cough or wheeze  Chest feels tight  Wake up at night   Keep taking your Green Zone medications  Start taking your rescue medicine:  every 20 minutes for up to 1 hour. Then every 4 hours for 24-48 hours.  If you stay in the Yellow Zone for more than 12-24 hours, contact your doctor.  If you do not return to the Green Zone in 12-24 hours or you get worse, start taking your oral steroid medicine if prescribed by your provider.           RED ZONE Medical Alert - Get Help  I have ANY of these:  I feel awful  Medicine is not helping  Breathing getting harder  Trouble walking or talking  Nose opens wide to breathe       Take your rescue medicine NOW  If your provider has prescribed an oral steroid medicine, start taking it NOW  Call your doctor NOW  If you are still in the Red Zone after 20 minutes and you have not  reached your doctor:  Take your rescue medicine again and  Call 911 or go to the emergency room right away    See your regular doctor within 2 weeks of an Emergency Room or Urgent Care visit for follow-up treatment.          Annual Reminders:  Meet with Asthma Educator,  Flu Shot in the Fall, consider Pneumonia Vaccination for patients with asthma (aged 19 and older).    Pharmacy: [id number] - [place], [place] - [address] AT [address] [place] [address]    Electronically signed by ROSIBEL Yin CNP   Date: 03/11/25                      Asthma Triggers  How To Control Things That Make Your Asthma Worse    Triggers are things that make your asthma worse.  Look at the list below to help you find your triggers and what you can do about them.  You can help prevent asthma flare-ups by staying away from your triggers.      Trigger                                                          What you can do   Cigarette Smoke  Tobacco smoke can make asthma worse. Do not allow smoking in your home, car or around you.  Be sure no one smokes at a child s day care or school.  If you smoke, ask your health care provider for ways to help you quit.  Ask family members to quit too.  Ask your health care provider for a referral to Quit Plan to help you quit smoking, or call 2-091-748-PLAN.     Colds, Flu, Bronchitis  These are common triggers of asthma. Wash your hands often.  Don t touch your eyes, nose or mouth.  Get a flu shot every year.     Dust Mites  These are tiny bugs that live in cloth or carpet. They are too small to see. Wash sheets and blankets in hot water every week.   Encase pillows and mattress in dust mite proof covers.  Avoid having carpet if you can. If you have carpet, vacuum weekly.   Use a dust mask and HEPA vacuum.   Pollen and Outdoor Mold  Some people are allergic to trees, grass, or weed pollen, or molds. Try to keep your windows closed.  Limit time out doors when pollen count is high.   Ask  you health care provider about taking medicine during allergy season.     Animal Dander  Some people are allergic to skin flakes, urine or saliva from pets with fur or feathers. Keep pets with fur or feathers out of your home.    If you can t keep the pet outdoors, then keep the pet out of your bedroom.  Keep the bedroom door closed.  Keep pets off cloth furniture and away from stuffed toys.     Mice, Rats, and Cockroaches   Some people are allergic to the waste from these pests.   Cover food and garbage.  Clean up spills and food crumbs.  Store grease in the refrigerator.   Keep food out of the bedroom.   Indoor Mold  This can be a trigger if your home has high moisture. Fix leaking faucets, pipes, or other sources of water.   Clean moldy surfaces.  Dehumidify basement if it is damp and smelly.   Smoke, Strong Odors, and Sprays  These can reduce air quality. Stay away from strong odors and sprays, such as perfume, powder, hair spray, paints, smoke incense, paint, cleaning products, candles and new carpet.   Exercise or Sports  Some people with asthma have this trigger. Be active!  Ask your doctor about taking medicine before sports or exercise to prevent symptoms.    Warm up for 5-10 minutes before and after sports or exercise.     Other Triggers of Asthma  Cold air:  Cover your nose and mouth with a scarf.  Sometimes laughing or crying can be a trigger.  Some medicines and food can trigger asthma.

## 2025-03-12 LAB
CHOLEST SERPL-MCNC: 156 MG/DL
CK SERPL-CCNC: 61 U/L (ref 26–192)
FASTING STATUS PATIENT QL REPORTED: YES
HCV AB SERPL QL IA: NONREACTIVE
HDLC SERPL-MCNC: 68 MG/DL
LDLC SERPL CALC-MCNC: 70 MG/DL
NONHDLC SERPL-MCNC: 88 MG/DL
TRIGL SERPL-MCNC: 89 MG/DL
TSH SERPL DL<=0.005 MIU/L-ACNC: 1.25 UIU/ML (ref 0.3–4.2)
VIT B12 SERPL-MCNC: 636 PG/ML (ref 232–1245)

## 2025-03-13 LAB
ALBUMIN SERPL BCG-MCNC: 4.3 G/DL (ref 3.5–5.2)
ALP SERPL-CCNC: 94 U/L (ref 40–150)
ALT SERPL W P-5'-P-CCNC: 15 U/L (ref 0–50)
ANION GAP SERPL CALCULATED.3IONS-SCNC: 11 MMOL/L (ref 7–15)
AST SERPL W P-5'-P-CCNC: 20 U/L (ref 0–45)
BILIRUB SERPL-MCNC: 0.5 MG/DL
BUN SERPL-MCNC: 16 MG/DL (ref 8–23)
CALCIUM SERPL-MCNC: NORMAL MG/DL
CHLORIDE SERPL-SCNC: 101 MMOL/L (ref 98–107)
CREAT SERPL-MCNC: 0.8 MG/DL (ref 0.51–0.95)
EGFRCR SERPLBLD CKD-EPI 2021: 78 ML/MIN/1.73M2
FASTING STATUS PATIENT QL REPORTED: YES
GLUCOSE SERPL-MCNC: 87 MG/DL (ref 70–99)
HCO3 SERPL-SCNC: 27 MMOL/L (ref 22–29)
POTASSIUM SERPL-SCNC: 4.2 MMOL/L (ref 3.4–5.3)
PROT SERPL-MCNC: 7.7 G/DL (ref 6.4–8.3)
SODIUM SERPL-SCNC: 139 MMOL/L (ref 135–145)

## 2025-03-15 ENCOUNTER — LAB (OUTPATIENT)
Dept: LAB | Facility: CLINIC | Age: 75
End: 2025-03-15
Payer: COMMERCIAL

## 2025-03-15 DIAGNOSIS — R19.5 DARK STOOLS: ICD-10-CM

## 2025-03-15 DIAGNOSIS — M79.10 MYALGIA: ICD-10-CM

## 2025-03-15 LAB
ERYTHROCYTE [SEDIMENTATION RATE] IN BLOOD BY WESTERGREN METHOD: 18 MM/HR (ref 0–30)
HGB BLD-MCNC: 11.9 G/DL (ref 11.7–15.7)
MISCELLANEOUS TEST 1 (ARUP): NORMAL

## 2025-03-15 PROCEDURE — 36415 COLL VENOUS BLD VENIPUNCTURE: CPT

## 2025-03-15 PROCEDURE — 85652 RBC SED RATE AUTOMATED: CPT

## 2025-03-15 PROCEDURE — 86140 C-REACTIVE PROTEIN: CPT

## 2025-03-15 PROCEDURE — 85018 HEMOGLOBIN: CPT

## 2025-03-16 LAB — CRP SERPL-MCNC: 11.4 MG/L

## 2025-03-17 ENCOUNTER — HOSPITAL ENCOUNTER (OUTPATIENT)
Dept: MAMMOGRAPHY | Facility: CLINIC | Age: 75
Discharge: HOME | End: 2025-03-17
Attending: NURSE PRACTITIONER | Admitting: NURSE PRACTITIONER
Payer: COMMERCIAL

## 2025-03-17 DIAGNOSIS — Z12.31 ENCOUNTER FOR SCREENING MAMMOGRAM FOR MALIGNANT NEOPLASM OF BREAST: ICD-10-CM

## 2025-03-17 DIAGNOSIS — R29.898 UPPER EXTREMITY WEAKNESS: Primary | ICD-10-CM

## 2025-03-17 DIAGNOSIS — M79.10 MYALGIA: ICD-10-CM

## 2025-03-17 PROCEDURE — 77063 BREAST TOMOSYNTHESIS BI: CPT

## 2025-03-19 ENCOUNTER — HOSPITAL ENCOUNTER (OUTPATIENT)
Dept: BONE DENSITY | Facility: CLINIC | Age: 75
Discharge: HOME | End: 2025-03-19
Attending: NURSE PRACTITIONER
Payer: COMMERCIAL

## 2025-03-19 DIAGNOSIS — M85.89 OTHER SPECIFIED DISORDERS OF BONE DENSITY AND STRUCTURE, MULTIPLE SITES: ICD-10-CM

## 2025-03-19 DIAGNOSIS — M85.80 OSTEOPENIA DETERMINED BY X-RAY: ICD-10-CM

## 2025-03-19 PROCEDURE — 77080 DXA BONE DENSITY AXIAL: CPT

## 2025-03-21 ENCOUNTER — ANCILLARY PROCEDURE (OUTPATIENT)
Dept: CT IMAGING | Facility: CLINIC | Age: 75
End: 2025-03-21
Attending: NURSE PRACTITIONER
Payer: COMMERCIAL

## 2025-03-21 DIAGNOSIS — R19.09 ABDOMINAL MASS OF OTHER SITE: ICD-10-CM

## 2025-03-21 PROCEDURE — 74177 CT ABD & PELVIS W/CONTRAST: CPT

## 2025-03-21 PROCEDURE — 250N000011 HC RX IP 250 OP 636: Performed by: NURSE PRACTITIONER

## 2025-03-21 RX ORDER — IOPAMIDOL 755 MG/ML
89 INJECTION, SOLUTION INTRAVASCULAR ONCE
Status: COMPLETED | OUTPATIENT
Start: 2025-03-21 | End: 2025-03-21

## 2025-03-21 RX ADMIN — IOPAMIDOL 89 ML: 755 INJECTION, SOLUTION INTRAVENOUS at 11:18

## 2025-03-24 ENCOUNTER — OFFICE VISIT (OUTPATIENT)
Dept: FAMILY MEDICINE | Facility: CLINIC | Age: 75
End: 2025-03-24
Payer: COMMERCIAL

## 2025-03-24 ENCOUNTER — ANCILLARY ORDERS (OUTPATIENT)
Dept: RADIOLOGY | Facility: CLINIC | Age: 75
End: 2025-03-24

## 2025-03-24 VITALS
RESPIRATION RATE: 18 BRPM | OXYGEN SATURATION: 96 % | HEART RATE: 71 BPM | DIASTOLIC BLOOD PRESSURE: 82 MMHG | TEMPERATURE: 98.7 F | SYSTOLIC BLOOD PRESSURE: 126 MMHG

## 2025-03-24 DIAGNOSIS — M81.0 OSTEOPOROSIS, UNSPECIFIED OSTEOPOROSIS TYPE, UNSPECIFIED PATHOLOGICAL FRACTURE PRESENCE: ICD-10-CM

## 2025-03-24 DIAGNOSIS — M62.81 MUSCLE WEAKNESS OF LEFT UPPER EXTREMITY: Primary | ICD-10-CM

## 2025-03-24 DIAGNOSIS — G31.84 MILD COGNITIVE IMPAIRMENT: ICD-10-CM

## 2025-03-24 LAB — PTH-INTACT SERPL-MCNC: 42 PG/ML (ref 15–65)

## 2025-03-24 PROCEDURE — G2211 COMPLEX E/M VISIT ADD ON: HCPCS | Performed by: NURSE PRACTITIONER

## 2025-03-24 PROCEDURE — 3079F DIAST BP 80-89 MM HG: CPT | Performed by: NURSE PRACTITIONER

## 2025-03-24 PROCEDURE — 36415 COLL VENOUS BLD VENIPUNCTURE: CPT | Performed by: NURSE PRACTITIONER

## 2025-03-24 PROCEDURE — 99214 OFFICE O/P EST MOD 30 MIN: CPT | Performed by: NURSE PRACTITIONER

## 2025-03-24 PROCEDURE — 83970 ASSAY OF PARATHORMONE: CPT | Performed by: NURSE PRACTITIONER

## 2025-03-24 PROCEDURE — 3074F SYST BP LT 130 MM HG: CPT | Performed by: NURSE PRACTITIONER

## 2025-03-24 PROCEDURE — 1126F AMNT PAIN NOTED NONE PRSNT: CPT | Performed by: NURSE PRACTITIONER

## 2025-03-24 RX ORDER — KETOROLAC TROMETHAMINE 5 MG/ML
SOLUTION OPHTHALMIC
COMMUNITY
Start: 2025-01-24

## 2025-03-24 RX ORDER — PREDNISOLONE ACETATE 10 MG/ML
SUSPENSION/ DROPS OPHTHALMIC
COMMUNITY
Start: 2025-02-25

## 2025-03-24 RX ORDER — ALENDRONATE SODIUM 70 MG/1
70 TABLET ORAL
Qty: 12 TABLET | Refills: 3 | Status: SHIPPED | OUTPATIENT
Start: 2025-03-24

## 2025-03-24 ASSESSMENT — PAIN SCALES - GENERAL: PAINLEVEL_OUTOF10: NO PAIN (0)

## 2025-03-24 NOTE — ASSESSMENT & PLAN NOTE
Recent DEXA showed osteoporosis.   Confirmed that she has not active dental problems and sees her dentist regularly  TSH, cbc and bmp wnl recently.   Check PTH today.   Continue vit D and calcium  Add back weight bearing exercise (will see PT soon to help guide return to exercise)  Start alendronate weekly. Discussed possible SE

## 2025-03-24 NOTE — ASSESSMENT & PLAN NOTE
CRP was mildly elevated, ESR was normal- was referred to rheumatology for evaluation to r/o PMR (unlikely with such mild elevation of crp), fibromyalgia  Keep appt with PT to guide a return to physical activity. Ok to start range of motion and lifting light weights before that appt if pain doesn't worsen.  Asked her to call and schedule the neurology appt as well to evaluate UE weakness/pain and history of falls. Family wonders about Lewy body dementia (has had 2 falls in past year, sometimes dizzy, cognitive impairment, temperature instability). Will repeat neuropsychological testing in July of this year to assess dementia, recommended discussing this further with neurology.

## 2025-03-24 NOTE — PROGRESS NOTES
The longitudinal plan of care for the diagnosis(es)/condition(s) as documented were addressed during this visit. Due to the added complexity in care, I will continue to support Jessica in the subsequent management and with ongoing continuity of care.  Assessment & Plan   Problem List Items Addressed This Visit          Medium    Osteoporosis without current pathological fracture     Recent DEXA showed osteoporosis.   Confirmed that she has not active dental problems and sees her dentist regularly  TSH, cbc and bmp wnl recently.   Check PTH today.   Continue vit D and calcium  Add back weight bearing exercise (will see PT soon to help guide return to exercise)  Start alendronate weekly. Discussed possible SE           Relevant Medications    alendronate (FOSAMAX) 70 MG tablet    Mild cognitive impairment     Will plan on repeating the neuropsych testing at 1 year after initial (recommendation from the neuropsychologist was 1-2 years) since Jessica's daughter notices progression.   Placed referral for neuropsych testing which can hopefully be completed July of this summer or just after that         Relevant Orders    Adult Mental Health  Referral    Muscle weakness of left upper extremity     CRP was mildly elevated, ESR was normal- was referred to rheumatology for evaluation to r/o PMR (unlikely with such mild elevation of crp), fibromyalgia  Keep appt with PT to guide a return to physical activity. Ok to start range of motion and lifting light weights before that appt if pain doesn't worsen.  Asked her to call and schedule the neurology appt as well to evaluate UE weakness/pain and history of falls. Family wonders about Lewy body dementia (has had 2 falls in past year, sometimes dizzy, cognitive impairment, temperature instability). Will repeat neuropsychological testing in July of this year to assess dementia, recommended discussing this further with neurology.          Other Visit Diagnoses       Osteoporosis,  unspecified osteoporosis type, unspecified pathological fracture presence    -  Primary    Relevant Medications    alendronate (FOSAMAX) 70 MG tablet    Other Relevant Orders    Parathyroid Hormone Intact             Discussed normal recent abd CT           Subjective   Jessica is a 74 year old, presenting for the following health issues:  Follow Up (Imaging and blood work. ) and Musculoskeletal Problem        3/24/2025     9:27 AM   Additional Questions   Roomed by Eileen INTERIANO   Accompanied by Annette (Daughter)     HPI      Here to discuss UE weakness, memory decline, recent DEXA, recent CT.     Since Dec, Jessica has experienced UE pain and weakness. Once she felt unable to stand up from a mattress where she has slept. 2 falls in past year.   Jessica noticed difficulty with word finding in recent years. This has remained relatively stable, but her daughter feels her cognitive impairment has progressed- more difficulty tracking appointments, staying organized.     Neuropsych testing at [facility] July 2024 showed mild cognitive impairment with recommendation to repeat testing in 1-2 years.   B12 normal Jan 2024  TSH (03/11/2025 10:22 AM)   MR Brain w/o & w Contrast (03/14/2024 7:48 AM)     Abd/pelvic CT done after provider felt mass at annual wellness visit. No mass on imaging.     Recent DEXA showed osteoporosis.      Hyperlipidemia Follow-Up    Are you regularly taking any medication or supplement to lower your cholesterol?   Yes- Atorvastatin  Are you having muscle aches or other side effects that you think could be caused by your cholesterol lowering medication?  No    Hypertension Follow-up    Do you check your blood pressure regularly outside of the clinic? Yes   Are you following a low salt diet? Yes  Are your blood pressures ever more than 140 on the top number (systolic) OR more   than 90 on the bottom number (diastolic), for example 140/90? No    Atrial Fibrillation Follow-up    Symptoms: no recent chest pain,  significant palpitations, dizziness/lightheadedness, dyspnea, or increased peripheral edema.  Stroke prevention: DOAC (Eliquis, Xarelto, Pradaxa)        7/18/2024     5:19 PM 7/18/2024     5:59 PM 1/11/2025     4:23 PM 3/11/2025     8:58 AM 3/24/2025     9:30 AM   Date   Pulse 64 68 73 76 71     Current [id number] Score: 3 points, which represents a 3.2% annual risk of major embolic event, without anti-coagulation or an LAAO device.   How many servings of fruits and vegetables do you eat daily?  0-1  On average, how many sweetened beverages do you drink each day (Examples: soda, juice, sweet tea, etc.  Do NOT count diet or artificially sweetened beverages)?   0  How many days per week do you exercise enough to make your heart beat faster? 3 or less  How many minutes a day do you exercise enough to make your heart beat faster? 9 or less  How many days per week do you miss taking your medication? 0        Review of Systems  CONSTITUTIONAL: NEGATIVE for fever, chills, change in weight  RESP: NEGATIVE for significant cough or SOB  CV: NEGATIVE for chest pain, palpitations or peripheral edema      Objective    /82   Pulse 71   Temp 98.7  F (37.1  C) (Temporal)   Resp 18   SpO2 96%   There is no height or weight on file to calculate BMI.  Physical Exam   Well appearing.             Signed Electronically by: ROSIBEL Yin CNP

## 2025-03-24 NOTE — ASSESSMENT & PLAN NOTE
Will plan on repeating the neuropsych testing at 1 year after initial (recommendation from the neuropsychologist was 1-2 years) since Jessica's daughter notices progression.   Placed referral for neuropsych testing which can hopefully be completed July of this summer or just after that

## 2025-03-25 ENCOUNTER — MYC MEDICAL ADVICE (OUTPATIENT)
Dept: FAMILY MEDICINE | Facility: CLINIC | Age: 75
End: 2025-03-25
Payer: COMMERCIAL

## 2025-03-25 DIAGNOSIS — G31.84 MILD COGNITIVE IMPAIRMENT: Primary | ICD-10-CM

## 2025-03-25 DIAGNOSIS — M62.81 MUSCLE WEAKNESS OF PROXIMAL EXTREMITY: ICD-10-CM

## 2025-03-25 NOTE — TELEPHONE ENCOUNTER
Placed referral to general neurology for concerns regarding proximal muscle weakness, memory. ROSIBEL Yin CNP

## 2025-03-31 ENCOUNTER — THERAPY VISIT (OUTPATIENT)
Dept: PHYSICAL THERAPY | Facility: CLINIC | Age: 75
End: 2025-03-31
Attending: NURSE PRACTITIONER
Payer: COMMERCIAL

## 2025-03-31 DIAGNOSIS — M79.10 MYALGIA: ICD-10-CM

## 2025-03-31 DIAGNOSIS — M25.511 BILATERAL SHOULDER PAIN: Primary | ICD-10-CM

## 2025-03-31 DIAGNOSIS — M25.512 BILATERAL SHOULDER PAIN: Primary | ICD-10-CM

## 2025-03-31 DIAGNOSIS — R53.83 FATIGUE, UNSPECIFIED TYPE: ICD-10-CM

## 2025-03-31 DIAGNOSIS — M62.81 MUSCLE WEAKNESS OF LOWER EXTREMITY: ICD-10-CM

## 2025-03-31 PROCEDURE — 97161 PT EVAL LOW COMPLEX 20 MIN: CPT | Mod: GP

## 2025-03-31 PROCEDURE — 97110 THERAPEUTIC EXERCISES: CPT | Mod: GP

## 2025-03-31 PROCEDURE — 97530 THERAPEUTIC ACTIVITIES: CPT | Mod: GP

## 2025-03-31 NOTE — PROGRESS NOTES
PHYSICAL THERAPY EVALUATION  Type of Visit: Evaluation              Subjective      Condition type:  Chronic (continuous duration <3 months)  Cause of current episode:  Unspecified     Nature of treatment:  Rehabilitative  Functional ability:  Severe functional limitations  Documented POC (choose all that apply):  Measurable short and long term/discharge treatment goals related to physical and functional deficits.;Frequency of treatment visits and treatment activities to address deficit areas.;Patient agrees to program participation including home program  Briefly describe symptoms:  general weakness, osteoporosis, fatigue, pain in UE and LE  How did the symptoms start:  gradual  Average pain/intensity last 24 hours:  7/10  Average pain/intensity past week:  7/10  Frequency of Symptoms:  Constantly (% of the time)  Symptom impact on ADLs:  Quite a bit  Condition change since eval:  N/A (first visit)  General health reported by patient:  Fair      Presenting condition or subjective complaint: I have had pain in both arms for 3 months. I v also had leg pain for 3 months. The other items in the previous questionnaire do bother me but I am most concerned about  my back & legs.  End of December patient reports symptoms were changing. She had cataract surgery at the time and she was nervous about it. These symptoms started after having the surgery. She is having pain in the arms and legs along with weakness.  She cannot  more than 10 lbs an getting dressed is difficult. If she sits for any length of time and then she goes to get up her legs do not want to cooperate. She has stiffness in her legs. Neck pain has gotten worse through all of this too. Sleeping has gotten better. When she has to get up in the morning she has to use her R arm to help her L arm get it moving.     Date of onset: 03/11/25 (date of order)    Relevant medical history:     Past Medical History:   Diagnosis Date    Allergic asthma, mild  intermittent, with acute exacerbation 03/03/2012    Allergic rhinitis 10/07/2019    Formatting of this note might be different from the original. 10/7/2019 - Dr. Reddy A: Perennial and seasonal rhinitis, poorly controlled with Flonase, Allegra and Singulair. Allergy skin tests show strongest reactivity to cat dander, guinea pigs, ragweed; moderate reactivity to dog dander, dust mites, molds and tree pollens.   P -Reviewed with patient perennial and seasonal allergen avoidance and    Arthritis 40 years    Benign neoplasm of colon 12/30/2003    Bilateral carpal tunnel syndrome 05/07/2023    Candidiasis 10/19/2018    Disorder of shoulder 05/31/2018    Essential hypertension, benign 10/31/2002    Gastroesophageal reflux disease with esophagitis 09/13/2001    Heart disease 5-    AFib    History of total knee replacement 11/17/2014    Last Assessment & Plan:  Formatting of this note might be different from the original. 69yoF with h/o right TKA in Nov 2014 with new fall onto right knee 2-3 weeks ago with increased pain. Imaging negative for fracture or hardware failure, and pain is improving.   -Reassured TKA is in place without loosening or fractures noted  -Continue activities as tolerated  -Continue exercises for ROM, streng    Hypersomnia with sleep apnea 11/01/2007    Mild single current episode of major depressive disorder 12/16/2017    Moderate persistent asthma without complication 06/12/2019    Formatting of this note might be different from the original. 10/7/2019 - Dr. Reddy  A: recurrent cough and wheeze not well controlled with Flovent 110. Allergy skin tests shows reactivity to multiple perennial and seasonal allergens that can exacerbate asthma control. Monitor for improvement with allergen desensitization.   P -Reviewed with patient the signs of asthma control include no cough or     Myopia 05/01/2008    BETO (obstructive sleep apnea) 05/07/2023    Osteopenia determined by x-ray 06/08/2018     Presbyopia 05/01/2008    Recurrent major depressive disorder, in partial remission 11/06/2020    Restless leg syndrome 02/10/2019    Sensorineural hearing loss (SNHL) of left ear with unrestricted hearing of right ear 09/21/2022    Spondylolisthesis at L5-S1 level 11/01/2017     Dates & types of surgery: 2 pregnancies, 2 knee replacements, broken ankle, broken wrist    Prior diagnostic imaging/testing results: MRI; CT scan; X-ray; Bone scan     Prior therapy history for the same diagnosis, illness or injury: No      Prior Level of Function  Transfers: Independent  Ambulation: Independent  ADL:   IADL:     Living Environment  Social support: Alone   Type of home: Apartment/condo   Stairs to enter the home: Yes 20 Is there a railing: Yes     Ramp: Yes   Stairs inside the home: No       Help at home: None  Equipment owned:       Employment: No    Hobbies/Interests: [employer] sports & school activities, walking, my dog, watching tv    Patient goals for therapy: Increase my functionalities on my arms & legs to be able to function normally.    Pain assessment: Pain present     Objective   LUMBAR SPINE EVALUATION  PAIN: Pain Level at Rest: 7/10  Pain Location: cervical spine, thoracic spine, lumbar spine, shoulder, and thighs and lower legs  Pain Quality: Aching  Pain Frequency: constant  POSTURE: Standing Posture: forward flexed  GAIT:   Weightbearing Status:  normal  Assistive Device(s): None  Gait Deviations:  forward flexed  ROM:  AROM flexion = WFL pain end range, extension = slight limited; PROM hips: flexion = limited L>R side, ER = slight limited and painful B, IR = limited and pain R>L side  STRENGTH:  hip abd: 3  B  DTR S:    Left Right   C5 (Biceps)     C6 (Brachioradialis)     C7 (Triceps)     L4 (Quad) WNL WNL   S1 (Achilles) Slight hypo Slight hyp      FUNCTIONAL TESTS: Double Leg Squat: Anterior knee translation, Knee valgus, Hip internal rotation, and Improper use of glutes/hips  PALPATION:  not  assessed    SHOULDER EVALUATION  PAIN: Pain Location: shoulder and both sides  Pain Quality: Aching  ROM:  AROM: flexion = limited to 90 deg, abd to 70 deg, ext = WFL, IR limited R>L side R hip pocket L L4, ER = 75 deg B with pain   STRENGTH:  WNL B; pain with abd B  PALPATION:  not assessed  JOINT MOBILITY:  flexion = 120 deg R side, 70 deg L side  CERVICAL SCREEN:  flexion = WNL, extension = limited, limited rotation B, side bend slightly limited B; thoracic rotation = WFL, extension = slight limited    Assessment & Plan   CLINICAL IMPRESSIONS  Medical Diagnosis: fatigue, osteoporosis    Treatment Diagnosis: weakness and return to activity, bilateral shoulder pain, bilateral leg pain   Impression/Assessment: Patient is a 74 year old female with global pain and weakness - mainly in B shoulders/arms (L>R side), LB and LEs complaints.  The following significant findings have been identified: Pain, Decreased ROM/flexibility, Decreased joint mobility, Decreased strength, Impaired gait, Impaired muscle performance, Decreased activity tolerance, and Impaired posture. These impairments interfere with their ability to perform self care tasks, recreational activities, household chores, household mobility, and community mobility as compared to previous level of function.     Clinical Decision Making (Complexity):  Clinical Presentation: Stable/Uncomplicated  Clinical Presentation Rationale: based on medical and personal factors listed in PT evaluation  Clinical Decision Making (Complexity): Moderate complexity    PLAN OF CARE  Treatment Interventions:  Modalities: E-stim  Interventions: Gait Training, Manual Therapy, Neuromuscular Re-education, Therapeutic Activity, Therapeutic Exercise, Self-Care/Home Management    Long Term Goals     PT Goal 1  Goal Identifier: ambulation  Goal Description: patient will be able to walk for 15 min with 2/10 pain or less  Rationale: to maximize safety and independence with performance of  ADLs and functional tasks;to maximize safety and independence within the home;to maximize safety and independence with self cares  Target Date: 06/23/25  PT Goal 2  Goal Identifier: reaching  Goal Description: patient will be able to reach overhead with 3/10 pain or less  Rationale: to maximize safety and independence with performance of ADLs and functional tasks;to maximize safety and independence within the home;to maximize safety and independence with self cares  Target Date: 06/23/25  PT Goal 3  Goal Identifier: lifting  Goal Description: patient will be able to lift 10# or more  Rationale: to maximize safety and independence with performance of ADLs and functional tasks;to maximize safety and independence within the home;to maximize safety and independence with self cares  Target Date: 06/23/25      Frequency of Treatment: 1x/wk progressing to 1 x every other week  Duration of Treatment: 2-3 months    Recommended Referrals to Other Professionals:  rheumatology, neurology  Education Assessment:   Learner/Method: Patient    Risks and benefits of evaluation/treatment have been explained.   Patient/Family/caregiver agrees with Plan of Care.     Evaluation Time:     PT Eval, Low Complexity Minutes (21636): 15     Signing Clinician: Sissy Noble, PT        [facility]                                                                                   OUTPATIENT PHYSICAL THERAPY      PLAN OF TREATMENT FOR OUTPATIENT REHABILITATION   Patient's Last Name, First Name, Jessica Moffett YOB: 1950   Provider's Name   [facility]   Medical Record No.  [id number]     Onset Date: 03/11/25 (date of order)  Start of Care Date: 03/31/25     Medical Diagnosis:  fatigue, osteoporosis      PT Treatment Diagnosis:  weakness and return to activity, bilateral shoulder pain, bilateral leg pain Plan of Treatment  Frequency/Duration: 1x/wk progressing to  1 x every other week/ 2-3 months    Certification date from 03/31/25 to 06/23/25         See note for plan of treatment details and functional goals     Sissy Noble, PT                         I CERTIFY THE NEED FOR THESE SERVICES FURNISHED UNDER        THIS PLAN OF TREATMENT AND WHILE UNDER MY CARE     (Physician attestation of this document indicates review and certification of the therapy plan).              Referring Provider:  Teressa Aquino    Initial Assessment  See Epic Evaluation- Start of Care Date: 03/31/25

## 2025-04-02 ENCOUNTER — TRANSFERRED RECORDS (OUTPATIENT)
Dept: HEALTH INFORMATION MANAGEMENT | Facility: CLINIC | Age: 75
End: 2025-04-02
Payer: COMMERCIAL

## 2025-04-10 ENCOUNTER — THERAPY VISIT (OUTPATIENT)
Dept: PHYSICAL THERAPY | Facility: CLINIC | Age: 75
End: 2025-04-10
Payer: COMMERCIAL

## 2025-04-10 DIAGNOSIS — M25.511 BILATERAL SHOULDER PAIN, UNSPECIFIED CHRONICITY: ICD-10-CM

## 2025-04-10 DIAGNOSIS — R53.83 FATIGUE, UNSPECIFIED TYPE: Primary | ICD-10-CM

## 2025-04-10 DIAGNOSIS — M25.512 BILATERAL SHOULDER PAIN, UNSPECIFIED CHRONICITY: ICD-10-CM

## 2025-04-10 DIAGNOSIS — M62.81 MUSCLE WEAKNESS OF LOWER EXTREMITY: ICD-10-CM

## 2025-04-10 DIAGNOSIS — M79.10 MYALGIA: ICD-10-CM

## 2025-04-30 ENCOUNTER — TRANSFERRED RECORDS (OUTPATIENT)
Dept: HEALTH INFORMATION MANAGEMENT | Facility: CLINIC | Age: 75
End: 2025-04-30
Payer: COMMERCIAL

## 2025-05-05 PROBLEM — M06.4 INFLAMMATORY POLYARTHRITIS (H): Status: ACTIVE | Noted: 2025-03-24

## 2025-05-12 ASSESSMENT — PATIENT HEALTH QUESTIONNAIRE - PHQ9
10. IF YOU CHECKED OFF ANY PROBLEMS, HOW DIFFICULT HAVE THESE PROBLEMS MADE IT FOR YOU TO DO YOUR WORK, TAKE CARE OF THINGS AT HOME, OR GET ALONG WITH OTHER PEOPLE: NOT DIFFICULT AT ALL
SUM OF ALL RESPONSES TO PHQ QUESTIONS 1-9: 4
SUM OF ALL RESPONSES TO PHQ QUESTIONS 1-9: 4

## 2025-05-13 ENCOUNTER — OFFICE VISIT (OUTPATIENT)
Dept: FAMILY MEDICINE | Facility: CLINIC | Age: 75
End: 2025-05-13
Attending: NURSE PRACTITIONER
Payer: COMMERCIAL

## 2025-05-13 VITALS
BODY MASS INDEX: 33.34 KG/M2 | OXYGEN SATURATION: 97 % | RESPIRATION RATE: 18 BRPM | HEART RATE: 69 BPM | SYSTOLIC BLOOD PRESSURE: 127 MMHG | TEMPERATURE: 97.4 F | DIASTOLIC BLOOD PRESSURE: 83 MMHG | HEIGHT: 62 IN | WEIGHT: 181.2 LBS

## 2025-05-13 DIAGNOSIS — M06.4 INFLAMMATORY POLYARTHRITIS (H): ICD-10-CM

## 2025-05-13 DIAGNOSIS — Z23 IMMUNIZATION DUE: ICD-10-CM

## 2025-05-13 DIAGNOSIS — G31.84 MILD COGNITIVE IMPAIRMENT: ICD-10-CM

## 2025-05-13 DIAGNOSIS — F33.42 RECURRENT MAJOR DEPRESSIVE DISORDER, IN FULL REMISSION: ICD-10-CM

## 2025-05-13 DIAGNOSIS — B97.7 HPV IN FEMALE: ICD-10-CM

## 2025-05-13 DIAGNOSIS — L90.0 LICHEN SCLEROSUS: Primary | ICD-10-CM

## 2025-05-13 PROBLEM — R53.83 FATIGUE, UNSPECIFIED TYPE: Status: RESOLVED | Noted: 2025-03-31 | Resolved: 2025-05-13

## 2025-05-13 PROCEDURE — 96127 BRIEF EMOTIONAL/BEHAV ASSMT: CPT | Performed by: NURSE PRACTITIONER

## 2025-05-13 PROCEDURE — 99214 OFFICE O/P EST MOD 30 MIN: CPT | Mod: 25 | Performed by: NURSE PRACTITIONER

## 2025-05-13 PROCEDURE — 3079F DIAST BP 80-89 MM HG: CPT | Performed by: NURSE PRACTITIONER

## 2025-05-13 PROCEDURE — 3074F SYST BP LT 130 MM HG: CPT | Performed by: NURSE PRACTITIONER

## 2025-05-13 PROCEDURE — 1126F AMNT PAIN NOTED NONE PRSNT: CPT | Performed by: NURSE PRACTITIONER

## 2025-05-13 PROCEDURE — 90480 ADMN SARSCOV2 VAC 1/ONLY CMP: CPT | Performed by: NURSE PRACTITIONER

## 2025-05-13 PROCEDURE — 91320 SARSCV2 VAC 30MCG TRS-SUC IM: CPT | Performed by: NURSE PRACTITIONER

## 2025-05-13 RX ORDER — CLOBETASOL PROPIONATE 0.5 MG/G
OINTMENT TOPICAL
Qty: 30 G | Refills: 0 | Status: SHIPPED | OUTPATIENT
Start: 2025-05-13

## 2025-05-13 ASSESSMENT — PAIN SCALES - GENERAL: PAINLEVEL_OUTOF10: NO PAIN (0)

## 2025-05-13 NOTE — PROGRESS NOTES
"  Assessment & Plan     Lichen sclerosus  Clobetasol daily for 6 weeks, then twice a week until Follow-up visit.   Follow-up in 2 months for pap, recheck.     Inflammatory polyarthritis (H)  Has responded to prednisone  Follows with rheumatology.     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     Mild cognitive impairment  Gave # to call and schedule neuropsychological testing.   Continue to use reminder systems, involve kids in financial decisions.  Discontinued several medications in an effort to simplify regimen (stop cetirizine, montelukast, multivitamin, vit C, gabapentin)    (F33.42) Recurrent major depressive disorder, in full remission  Plan: Continue current medications    (Z23) Immunization due    Plan: covid booster given.        The longitudinal plan of care for the diagnosis(es)/condition(s) as documented were addressed during this visit. Due to the added complexity in care, I will continue to support Jessica in the subsequent management and with ongoing continuity of care.            Subjective   Jessica is a 74 year old, presenting for the following health issues:   Follow Up        5/13/2025     1:02 PM   Additional Questions   Roomed by Betzy     History of Present Illness       Mental Health Follow-up:  Patient presents to follow-up on Depression.Patient's depression since last visit has been:  Good  The patient is not having other symptoms associated with depression.      Any significant life events: No  Patient is not feeling anxious or having panic attacks.  Patient has concerns about alcohol or drug use.                       Objective    /83 (BP Location: Right arm, Patient Position: Sitting, Cuff Size: Adult Large)   Pulse 69   Temp 97.4  F (36.3  C) (Temporal)   Resp 18   Ht 1.57 m (5' 1.81\")   Wt 82.2 kg (181 lb 3.2 oz)   LMP  (LMP Unknown)   SpO2 97%   Breastfeeding No   BMI 33.34 kg/m    Body mass index is 33.34 kg/m .  Physical Exam "   Well appearing            Signed Electronically by: ROSIBEL Yin CNP

## 2025-05-13 NOTE — ASSESSMENT & PLAN NOTE
Clobetasol daily for 6 weeks, then twice a week until Follow-up visit.   Follow-up in 2 months for pap, recheck.

## 2025-05-13 NOTE — ASSESSMENT & PLAN NOTE
Gave # to call and schedule neuropsychological testing.   Continue to use reminder systems, involve kids in financial decisions.  Discontinued several medications in an effort to simplify regimen (stop cetirizine, montelukast, multivitamin, vit C, gabapentin)

## 2025-05-16 PROBLEM — M79.10 MYALGIA: Status: RESOLVED | Noted: 2025-03-31 | Resolved: 2025-05-16

## 2025-05-16 PROBLEM — M25.511 BILATERAL SHOULDER PAIN: Status: RESOLVED | Noted: 2025-03-31 | Resolved: 2025-05-16

## 2025-05-16 PROBLEM — M25.512 BILATERAL SHOULDER PAIN: Status: RESOLVED | Noted: 2025-03-31 | Resolved: 2025-05-16

## 2025-05-16 PROBLEM — M62.81 MUSCLE WEAKNESS OF LOWER EXTREMITY: Status: RESOLVED | Noted: 2025-03-31 | Resolved: 2025-05-16

## 2025-05-21 ENCOUNTER — MYC MEDICAL ADVICE (OUTPATIENT)
Dept: FAMILY MEDICINE | Facility: CLINIC | Age: 75
End: 2025-05-21
Payer: COMMERCIAL

## 2025-05-21 DIAGNOSIS — I48.0 PAROXYSMAL ATRIAL FIBRILLATION (H): ICD-10-CM

## 2025-05-21 DIAGNOSIS — K21.00 GASTROESOPHAGEAL REFLUX DISEASE WITH ESOPHAGITIS WITHOUT HEMORRHAGE: Primary | ICD-10-CM

## 2025-05-28 RX ORDER — DILTIAZEM HYDROCHLORIDE 180 MG/1
180 CAPSULE, EXTENDED RELEASE ORAL DAILY
Qty: 90 CAPSULE | Refills: 3 | Status: SHIPPED | OUTPATIENT
Start: 2025-05-28

## 2025-05-28 RX ORDER — PANTOPRAZOLE SODIUM 40 MG/1
40 TABLET, DELAYED RELEASE ORAL DAILY
Qty: 90 TABLET | Refills: 3 | Status: SHIPPED | OUTPATIENT
Start: 2025-05-28

## 2025-06-11 ENCOUNTER — MYC MEDICAL ADVICE (OUTPATIENT)
Dept: FAMILY MEDICINE | Facility: CLINIC | Age: 75
End: 2025-06-11
Payer: COMMERCIAL

## 2025-06-11 DIAGNOSIS — M81.0 OSTEOPOROSIS, UNSPECIFIED OSTEOPOROSIS TYPE, UNSPECIFIED PATHOLOGICAL FRACTURE PRESENCE: ICD-10-CM

## 2025-06-12 RX ORDER — ALENDRONATE SODIUM 70 MG/1
70 TABLET ORAL
Qty: 12 TABLET | Refills: 3 | Status: SHIPPED | OUTPATIENT
Start: 2025-06-12

## 2025-07-08 ENCOUNTER — TRANSFERRED RECORDS (OUTPATIENT)
Dept: HEALTH INFORMATION MANAGEMENT | Facility: CLINIC | Age: 75
End: 2025-07-08
Payer: COMMERCIAL

## 2025-07-08 ENCOUNTER — MYC MEDICAL ADVICE (OUTPATIENT)
Dept: FAMILY MEDICINE | Facility: CLINIC | Age: 75
End: 2025-07-08
Payer: COMMERCIAL

## 2025-07-08 DIAGNOSIS — J45.40 MODERATE PERSISTENT ASTHMA WITHOUT COMPLICATION: Primary | ICD-10-CM

## 2025-07-08 NOTE — PROGRESS NOTES
Cardiology Clinic Progress Note  Jessica Ricketts MRN# [id number]   YOB: 1950 Age: 74 year old         Primary Cardiologist: Dr. Parnell      Assessment:     1.  PAF - no recent symptomatic episodes, she wears an Apple Watch to monitor her rhythm, on Diltiazem 180 mg daily for rate control and Xarelto 20 mg daily for CVA prophylaxis. Continue current therapy    2. HTN - controlled on Losartan 100 mg daily, Diltiazem 180 mg daily.     3. Borderline BETO - had sleep study years ago, does not use CPAP. May benefit from follow up with sleep clinic. She will discuss with PCP         Plan:   - continue current medications   - follow up in 1 year       [facility]        HPI:   Jessica Ricketts is a  74 year old female with PMH significant for PAF, HTN, asthma, GERD, borderline BETO (not on CPAP)    Patient presents today for follow up in cardiology clinic and was last seen by Dr. Parnell in 4/2024 at which time no changes were made to medications. He was continued on Diltiazem and Xarelto.     Today, patient reports she is doing very well from a cardiac standpoint with no recent symptoms suggestive of recurrent AF. She wears an Apple Watch to help monitor her rhythm.         ROS:   Please see pertinent positives in HPI      Medications:   Current Outpatient Medications   Medication Sig Dispense Refill    acetaminophen (TYLENOL) 325 MG tablet Take 325-650 mg by mouth every 6 hours as needed for mild pain      albuterol (PROAIR HFA/PROVENTIL HFA/VENTOLIN HFA) 108 (90 Base) MCG/ACT inhaler Inhale 2 puffs into the lungs every 4 hours as needed      alendronate (FOSAMAX) 70 MG tablet Take 1 tablet (70 mg) by mouth every 7 days. 12 tablet 3    amLODIPine (NORVASC) 5 MG tablet Take 5 mg by mouth daily.      atorvastatin (LIPITOR) 20 MG tablet Take 20 mg by mouth daily.      clobetasol (TEMOVATE) 0.05 % external ointment Apply about 0.25 g (about size of 1/2 of a chocolate chip)  "daily for 6 weeks to skinny area of rash/itching, then just twice weekly ongoing until return to clinic. 30 g 0    diclofenac (VOLTAREN) 1 % topical gel Apply 2-4 g topically 3 times daily as needed      diltiazem ER (DILT-XR) 180 MG 24 hr capsule Take 1 capsule (180 mg) by mouth daily. 90 capsule 3    DULoxetine (CYMBALTA) 60 MG capsule Take 1 capsule (60 mg) by mouth daily. 90 capsule 3    fluticasone (FLONASE) 50 MCG/ACT nasal spray Spray 1 spray into both nostrils daily.      fluticasone-vilanterol (BREO ELLIPTA) 200-25 MCG/INH inhaler Inhale 1 puff into the lungs daily      ketorolac (ACULAR) 0.5 % ophthalmic solution       losartan (COZAAR) 100 MG tablet Take 100 mg by mouth daily      ondansetron (ZOFRAN ODT) 4 MG ODT tab Take 1 tablet (4 mg) by mouth every 8 hours as needed for nausea 10 tablet 0    pantoprazole (PROTONIX) 40 MG EC tablet Take 1 tablet (40 mg) by mouth daily. 90 tablet 3    pramipexole (MIRAPEX) 0.125 MG tablet Take 1 tablet by mouth At Bedtime      prednisoLONE acetate (PRED FORTE) 1 % ophthalmic suspension       predniSONE (DELTASONE) 5 MG tablet Take 1 tablet (5 mg) by mouth daily for 23 days, THEN 0.5 tablets (2.5 mg) daily. 42 tablet 0    rivaroxaban ANTICOAGULANT (XARELTO) 20 MG TABS tablet Take 1 tablet (20 mg) by mouth daily (with dinner). 90 tablet 3    Turmeric 500 MG CAPS Take 500 mg by mouth.           Physical Exam:     Vitals: /88   Pulse 76   Ht 1.57 m (5' 1.81\")   Wt 85.9 kg (189 lb 6.4 oz)   LMP  (LMP Unknown)   BMI 34.86 kg/m       Wt Readings from Last 4 Encounters:   05/13/25 82.2 kg (181 lb 3.2 oz)   03/11/25 82.1 kg (181 lb 1.6 oz)   01/11/25 83.9 kg (185 lb)   04/23/24 78 kg (172 lb)       GEN: NAD  C/V:  Regular rate and rhythm, no murmur.    RESP: Respirations are unlabored. Clear to auscultation bilaterally without wheezing, rales, or rhonchi.  EXT: No LE edema. Venous varicosities       Data:     Last lab work personally reviewed     Echo: 5/2023  The " visual ejection fraction is estimated at 54%.  The right ventricle is normal in size and function.  The inferior vena cava was normal in size with preserved respiratory  variability.  There is no pericardial effusion.    Zio 2023  No AF or significant bradycardia

## 2025-07-09 ENCOUNTER — OFFICE VISIT (OUTPATIENT)
Dept: CARDIOLOGY | Facility: CLINIC | Age: 75
End: 2025-07-09
Payer: COMMERCIAL

## 2025-07-09 VITALS
SYSTOLIC BLOOD PRESSURE: 120 MMHG | BODY MASS INDEX: 34.85 KG/M2 | HEART RATE: 76 BPM | HEIGHT: 62 IN | DIASTOLIC BLOOD PRESSURE: 88 MMHG | WEIGHT: 189.4 LBS

## 2025-07-09 DIAGNOSIS — I48.0 PAROXYSMAL ATRIAL FIBRILLATION (H): Primary | ICD-10-CM

## 2025-07-09 DIAGNOSIS — I10 ESSENTIAL HYPERTENSION, BENIGN: ICD-10-CM

## 2025-07-09 NOTE — LETTER
7/9/2025    Anat Aquino, ROSIBEL CNP  [address]  [place] [address]    RE: Jessica Ricketts       Dear Colleague,     I had the pleasure of seeing Jessica Ricketts in the [facility].  Cardiology Clinic Progress Note  Jessica Ricketts MRN# [id number]   YOB: 1950 Age: 74 year old         Primary Cardiologist: Dr. Parnell      Assessment:     1.  PAF - no recent symptomatic episodes, she wears an Apple Watch to monitor her rhythm, on Diltiazem 180 mg daily for rate control and Xarelto 20 mg daily for CVA prophylaxis. Continue current therapy    2. HTN - controlled on Losartan 100 mg daily, Diltiazem 180 mg daily.     3. Borderline BETO - had sleep study years ago, does not use CPAP. May benefit from follow up with sleep clinic. She will discuss with PCP         Plan:   - continue current medications   - follow up in 1 year       [facility]        HPI:   Jessica Ricketts is a  74 year old female with PMH significant for PAF, HTN, asthma, GERD, borderline BETO (not on CPAP)    Patient presents today for follow up in cardiology clinic and was last seen by Dr. Parnell in 4/2024 at which time no changes were made to medications. He was continued on Diltiazem and Xarelto.     Today, patient reports she is doing very well from a cardiac standpoint with no recent symptoms suggestive of recurrent AF. She wears an Apple Watch to help monitor her rhythm.         ROS:   Please see pertinent positives in HPI      Medications:   Current Outpatient Medications   Medication Sig Dispense Refill     acetaminophen (TYLENOL) 325 MG tablet Take 325-650 mg by mouth every 6 hours as needed for mild pain       albuterol (PROAIR HFA/PROVENTIL HFA/VENTOLIN HFA) 108 (90 Base) MCG/ACT inhaler Inhale 2 puffs into the lungs every 4 hours as needed       alendronate (FOSAMAX) 70 MG tablet Take 1 tablet (70 mg) by mouth every 7 days. 12 tablet 3     amLODIPine (NORVASC) 5  "MG tablet Take 5 mg by mouth daily.       atorvastatin (LIPITOR) 20 MG tablet Take 20 mg by mouth daily.       clobetasol (TEMOVATE) 0.05 % external ointment Apply about 0.25 g (about size of 1/2 of a chocolate chip) daily for 6 weeks to skinny area of rash/itching, then just twice weekly ongoing until return to clinic. 30 g 0     diclofenac (VOLTAREN) 1 % topical gel Apply 2-4 g topically 3 times daily as needed       diltiazem ER (DILT-XR) 180 MG 24 hr capsule Take 1 capsule (180 mg) by mouth daily. 90 capsule 3     DULoxetine (CYMBALTA) 60 MG capsule Take 1 capsule (60 mg) by mouth daily. 90 capsule 3     fluticasone (FLONASE) 50 MCG/ACT nasal spray Spray 1 spray into both nostrils daily.       fluticasone-vilanterol (BREO ELLIPTA) 200-25 MCG/INH inhaler Inhale 1 puff into the lungs daily       ketorolac (ACULAR) 0.5 % ophthalmic solution        losartan (COZAAR) 100 MG tablet Take 100 mg by mouth daily       ondansetron (ZOFRAN ODT) 4 MG ODT tab Take 1 tablet (4 mg) by mouth every 8 hours as needed for nausea 10 tablet 0     pantoprazole (PROTONIX) 40 MG EC tablet Take 1 tablet (40 mg) by mouth daily. 90 tablet 3     pramipexole (MIRAPEX) 0.125 MG tablet Take 1 tablet by mouth At Bedtime       prednisoLONE acetate (PRED FORTE) 1 % ophthalmic suspension        predniSONE (DELTASONE) 5 MG tablet Take 1 tablet (5 mg) by mouth daily for 23 days, THEN 0.5 tablets (2.5 mg) daily. 42 tablet 0     rivaroxaban ANTICOAGULANT (XARELTO) 20 MG TABS tablet Take 1 tablet (20 mg) by mouth daily (with dinner). 90 tablet 3     Turmeric 500 MG CAPS Take 500 mg by mouth.           Physical Exam:     Vitals: /88   Pulse 76   Ht 1.57 m (5' 1.81\")   Wt 85.9 kg (189 lb 6.4 oz)   LMP  (LMP Unknown)   BMI 34.86 kg/m       Wt Readings from Last 4 Encounters:   05/13/25 82.2 kg (181 lb 3.2 oz)   03/11/25 82.1 kg (181 lb 1.6 oz)   01/11/25 83.9 kg (185 lb)   04/23/24 78 kg (172 lb)       GEN: NAD  C/V:  Regular rate and rhythm, " no murmur.    RESP: Respirations are unlabored. Clear to auscultation bilaterally without wheezing, rales, or rhonchi.  EXT: No LE edema. Venous varicosities       Data:     Last lab work personally reviewed     Echo: 5/2023  The visual ejection fraction is estimated at 54%.  The right ventricle is normal in size and function.  The inferior vena cava was normal in size with preserved respiratory  variability.  There is no pericardial effusion.    Zio 2023  No AF or significant bradycardia           Thank you for allowing me to participate in the care of your patient.      Sincerely,     Naya Berman PA-C     [facility]  cc:   Dorys Parnell MD  [address]  [place] [id number]

## 2025-07-09 NOTE — TELEPHONE ENCOUNTER
Duloxetine - should have refills on file with pharmacy   Breo - historical on med list (previously prescribed by hospital)      Disp Refills Start End CARLOS   DULoxetine (CYMBALTA) 60 MG capsule 90 capsule 3 4/8/2025 -- No   Sig - Route: Take 1 capsule (60 mg) by mouth daily. - Oral   Sent to pharmacy as: DULoxetine HCl 60 MG Oral Capsule Delayed Release Particles (CYMBALTA)   Class: E-Prescribe   Order: [id number]   E-Prescribing Status: Receipt confirmed by pharmacy (4/8/2025  3:56 PM CDT)     Printout Tracking    External Result Report     Pharmacy    [facility] - [address]      Disp Refills Start End CARLOS   fluticasone-vilanterol (BREO ELLIPTA) 200-25 MCG/INH inhaler -- -- 3/2/2022 -- --   Sig - Route: Inhale 1 puff into the lungs daily - Inhalation   Class: Historical   Order: [id number]

## 2025-07-09 NOTE — PATIENT INSTRUCTIONS
Thank you for your visit with the [facility] today.    Today's plan:   - continue current medications  - call us if you feel like you are back in atrial fibrillation. If your heart rate is elevated consistently you can present to the ER    - follow up in 1 year     If you have questions or concerns please call the nurse team at [phone] or send a [employer] message.     Scheduling phone number: [phone]    It was a pleasure seeing you today!     Naya Berman PA-C   [facility] [place] [facility]

## 2025-07-10 RX ORDER — FLUTICASONE FUROATE AND VILANTEROL 200; 25 UG/1; UG/1
1 POWDER RESPIRATORY (INHALATION) DAILY
Qty: 1 EACH | Refills: 11 | Status: SHIPPED | OUTPATIENT
Start: 2025-07-10

## 2025-07-14 DIAGNOSIS — I67.9 SMALL VESSEL DISEASE, CEREBROVASCULAR: ICD-10-CM

## 2025-07-14 NOTE — TELEPHONE ENCOUNTER
Clinic RN: Please investigate patient's chart or contact patient if the information cannot be found because the medication is listed as historical or discontinued. Confirm patient is taking this medication. Document findings and route refill encounter to provider for approval or denial.    Arely Smith RN on 7/14/2025 at 12:12 PM

## 2025-07-16 RX ORDER — ATORVASTATIN CALCIUM 20 MG/1
20 TABLET, FILM COATED ORAL DAILY
Qty: 90 TABLET | Refills: 0 | Status: SHIPPED | OUTPATIENT
Start: 2025-07-16

## 2025-08-12 ENCOUNTER — MYC MEDICAL ADVICE (OUTPATIENT)
Dept: FAMILY MEDICINE | Facility: CLINIC | Age: 75
End: 2025-08-12
Payer: COMMERCIAL

## 2025-08-17 ENCOUNTER — MYC MEDICAL ADVICE (OUTPATIENT)
Dept: FAMILY MEDICINE | Facility: CLINIC | Age: 75
End: 2025-08-17
Payer: COMMERCIAL

## 2025-08-17 DIAGNOSIS — I10 ESSENTIAL HYPERTENSION, BENIGN: Primary | ICD-10-CM

## 2025-08-17 DIAGNOSIS — K21.00 GASTROESOPHAGEAL REFLUX DISEASE WITH ESOPHAGITIS WITHOUT HEMORRHAGE: ICD-10-CM

## 2025-08-17 DIAGNOSIS — I48.0 PAROXYSMAL ATRIAL FIBRILLATION (H): ICD-10-CM

## 2025-08-18 RX ORDER — LOSARTAN POTASSIUM 100 MG/1
100 TABLET ORAL DAILY
Qty: 90 TABLET | Refills: 3 | Status: SHIPPED | OUTPATIENT
Start: 2025-08-18

## 2025-08-18 RX ORDER — PANTOPRAZOLE SODIUM 40 MG/1
40 TABLET, DELAYED RELEASE ORAL DAILY
Qty: 90 TABLET | Refills: 3 | Status: SHIPPED | OUTPATIENT
Start: 2025-08-18

## 2025-08-18 RX ORDER — DILTIAZEM HYDROCHLORIDE 180 MG/1
180 CAPSULE, EXTENDED RELEASE ORAL DAILY
Qty: 90 CAPSULE | Refills: 3 | Status: SHIPPED | OUTPATIENT
Start: 2025-08-18

## 2025-08-23 ENCOUNTER — MYC MEDICAL ADVICE (OUTPATIENT)
Dept: NEUROLOGY | Facility: CLINIC | Age: 75
End: 2025-08-23
Payer: COMMERCIAL

## 2025-08-26 ENCOUNTER — LAB (OUTPATIENT)
Dept: LAB | Facility: CLINIC | Age: 75
End: 2025-08-26
Payer: COMMERCIAL

## 2025-08-26 ENCOUNTER — OFFICE VISIT (OUTPATIENT)
Dept: NEUROLOGY | Facility: CLINIC | Age: 75
End: 2025-08-26
Attending: NURSE PRACTITIONER
Payer: COMMERCIAL

## 2025-08-26 VITALS — DIASTOLIC BLOOD PRESSURE: 90 MMHG | OXYGEN SATURATION: 100 % | SYSTOLIC BLOOD PRESSURE: 129 MMHG | HEART RATE: 80 BPM

## 2025-08-26 DIAGNOSIS — M62.81 MUSCLE WEAKNESS OF PROXIMAL EXTREMITY: ICD-10-CM

## 2025-08-26 DIAGNOSIS — G31.84 MILD COGNITIVE IMPAIRMENT: Primary | ICD-10-CM

## 2025-08-26 PROCEDURE — 3080F DIAST BP >= 90 MM HG: CPT | Performed by: PSYCHIATRY & NEUROLOGY

## 2025-08-26 PROCEDURE — G2211 COMPLEX E/M VISIT ADD ON: HCPCS | Performed by: PSYCHIATRY & NEUROLOGY

## 2025-08-26 PROCEDURE — 99205 OFFICE O/P NEW HI 60 MIN: CPT | Performed by: PSYCHIATRY & NEUROLOGY

## 2025-08-26 PROCEDURE — 3074F SYST BP LT 130 MM HG: CPT | Performed by: PSYCHIATRY & NEUROLOGY

## 2025-08-26 ASSESSMENT — MONTREAL COGNITIVE ASSESSMENT (MOCA)
4. NAME EACH OF THE THREE ANIMALS SHOWN: 3
11. FOR EACH PAIR OF WORDS, WHAT CATEGORY DO THEY BELONG TO (OUT OF 2): 1
8. SERIAL SUBTRACTION OF 7S: 2
WHAT LEVEL OF EDUCATION WAS ATTAINED: 0
VISUOSPATIAL/EXECUTIVE SUBSCORE: 3
12. MEMORY INDEX SCORE: 0
9. REPEAT EACH SENTENCE: 1
13. ORIENTATION SUBSCORE: 5
6. READ LIST OF DIGITS [FORWARD/BACKWARD]: 2
WHAT IS THE TOTAL SCORE (OUT OF 30): 19
7. [VIGILENCE] TAP WHEN HEARING DESIGNATED LETTER: 1
10. [FLUENCY] NAME WORDS STARTING WITH DESIGNATED LETTER: 1

## 2025-09-01 ENCOUNTER — PATIENT OUTREACH (OUTPATIENT)
Dept: CARE COORDINATION | Facility: CLINIC | Age: 75
End: 2025-09-01
Payer: COMMERCIAL

## (undated) RX ORDER — FENTANYL CITRATE 50 UG/ML
INJECTION, SOLUTION INTRAMUSCULAR; INTRAVENOUS
Status: DISPENSED
Start: 2023-05-08